# Patient Record
Sex: MALE | Race: ASIAN | NOT HISPANIC OR LATINO | Employment: FULL TIME | URBAN - METROPOLITAN AREA
[De-identification: names, ages, dates, MRNs, and addresses within clinical notes are randomized per-mention and may not be internally consistent; named-entity substitution may affect disease eponyms.]

---

## 2017-12-08 ENCOUNTER — OFFICE VISIT (OUTPATIENT)
Dept: URGENT CARE | Facility: URGENT CARE | Age: 42
End: 2017-12-08
Payer: COMMERCIAL

## 2017-12-08 VITALS
WEIGHT: 149 LBS | OXYGEN SATURATION: 98 % | DIASTOLIC BLOOD PRESSURE: 75 MMHG | TEMPERATURE: 98.2 F | HEART RATE: 78 BPM | SYSTOLIC BLOOD PRESSURE: 126 MMHG

## 2017-12-08 DIAGNOSIS — J20.9 ACUTE BRONCHITIS WITH SYMPTOMS > 10 DAYS: Primary | ICD-10-CM

## 2017-12-08 PROCEDURE — 99203 OFFICE O/P NEW LOW 30 MIN: CPT | Performed by: NURSE PRACTITIONER

## 2017-12-08 RX ORDER — METFORMIN HCL 500 MG
TABLET, EXTENDED RELEASE 24 HR ORAL
Refills: 2 | COMMUNITY
Start: 2017-10-25 | End: 2018-05-23

## 2017-12-08 RX ORDER — AZITHROMYCIN 250 MG/1
TABLET, FILM COATED ORAL
Qty: 6 TABLET | Refills: 0 | Status: SHIPPED | OUTPATIENT
Start: 2017-12-08 | End: 2018-02-28

## 2017-12-08 RX ORDER — BENZONATATE 200 MG/1
200 CAPSULE ORAL 3 TIMES DAILY PRN
Qty: 21 CAPSULE | Refills: 0 | Status: SHIPPED | OUTPATIENT
Start: 2017-12-08 | End: 2017-12-15

## 2017-12-08 NOTE — PATIENT INSTRUCTIONS

## 2017-12-08 NOTE — NURSING NOTE
Chief Complaint   Patient presents with     Cough     Pt c/o URI for 3-4 days.        Initial /75 (BP Location: Left arm, Patient Position: Chair, Cuff Size: Adult Regular)  Pulse 78  Temp 98.2  F (36.8  C) (Oral)  Wt 149 lb (67.6 kg)  SpO2 98% There is no height or weight on file to calculate BMI.  Medication Reconciliation: complete     Kimberlyn Cross CMA (AAMA)

## 2017-12-08 NOTE — MR AVS SNAPSHOT
After Visit Summary   12/8/2017    Dorian Husain    MRN: [id number]           Patient Information     Date Of Birth          1975        Visit Information        Provider Department      12/8/2017 12:30 PM Monique Wheeler NP [facility]        Today's Diagnoses     Acute bronchitis with symptoms > 10 days    -  1      Care Instructions      Acute Bronchitis  Your healthcare provider has told you that you have acute bronchitis. Bronchitis is infection or inflammation of the bronchial tubes (airways in the lungs). Normally, air moves easily in and out of the airways. Bronchitis narrows the airways, making it harder for air to flow in and out of the lungs. This causes symptoms such as shortness of breath, coughing up yellow or green mucus, and wheezing. Bronchitis can be acute or chronic. Acute means the condition comes on quickly and goes away in a short time, usually within 3 to 10 days. Chronic means a condition lasts a long time and often comes back.    What causes acute bronchitis?  Acute bronchitis almost always starts as a viral respiratory infection, such as a cold or the flu. Certain factors make it more likely for a cold or flu to turn into bronchitis. These include being very young, being elderly, having a heart or lung problem, or having a weak immune system. Cigarette smoking also makes bronchitis more likely.  When bronchitis develops, the airways become swollen. The airways may also become infected with bacteria. This is known as a secondary infection.  Diagnosing acute bronchitis  Your healthcare provider will examine you and ask about your symptoms and health history. You may also have a sputum culture to test the fluid in your lungs. Chest X-rays may be done to look for infection in the lungs.  Treating acute bronchitis  Bronchitis usually clears up as the cold or flu goes away. You can help feel better faster by doing the following:    Take medicine as directed.  You may be told to take ibuprofen or other over-the-counter medicines. These help relieve inflammation in your bronchial tubes. Your healthcare provider may prescribe an inhaler to help open up the bronchial tubes. Most of the time, acute bronchitis is caused by a viral infection. Antibiotics are usually not prescribed for viral infections.    Drink plenty of fluids, such as water, juice, or warm soup. Fluids loosen mucus so that you can cough it up. This helps you breathe more easily. Fluids also prevent dehydration.    Make sure you get plenty of rest.    Do not smoke. Do not allow anyone else to smoke in your home.  Recovery and follow-up  Follow up with your doctor as you are told. You will likely feel better in a week or two. But a dry cough can linger beyond that time. Let your doctor know if you still have symptoms (other than a dry cough) after 2 weeks, or if you re prone to getting bronchial infections. Take steps to protect yourself from future infections. These steps include stopping smoking and avoiding tobacco smoke, washing your hands often, and getting a yearly flu shot.  When to call your healthcare provider  Call the healthcare provider if you have any of the following:    Fever of 100.4 F (38.0 C) or higher, or as advised    Symptoms that get worse, or new symptoms    Trouble breathing    Symptoms that don t start to improve within a week, or within 3 days of taking antibiotics   Date Last Reviewed: 12/1/2016 2000-2017 [employer]. [address] [place] [address], [place] [address]. All rights reserved. This information is not intended as a substitute for professional medical care. Always follow your healthcare professional's instructions.                Follow-ups after your visit        Who to contact     If you have questions or need follow up information about today's clinic visit or your schedule please contact [facility] directly at [phone].  Normal or  "non-critical lab and imaging results will be communicated to you by MyChart, letter or phone within 4 business days after the clinic has received the results. If you do not hear from us within 7 days, please contact the clinic through [employer] or phone. If you have a critical or abnormal lab result, we will notify you by phone as soon as possible.  Submit refill requests through [employer] or call your pharmacy and they will forward the refill request to us. Please allow 3 business days for your refill to be completed.          Additional Information About Your Visit        Negorama Information     Negorama lets you send messages to your doctor, view your test results, renew your prescriptions, schedule appointments and more. To sign up, go to [email or web address] . Click on \"Log in\" on the left side of the screen, which will take you to the Welcome page. Then click on \"Sign up Now\" on the right side of the page.     You will be asked to enter the access code listed below, as well as some personal information. Please follow the directions to create your username and password.     Your access code is: [email or web address]  Expires: 3/8/2018  1:17 PM     Your access code will  in 90 days. If you need help or a new code, please call your [facility] clinic or [phone].        Care EveryWhere ID     This is your Care EveryWhere ID. This could be used by other organizations to access your [facility] medical records  [id number]        Your Vitals Were     Pulse Temperature Pulse Oximetry             78 98.2  F (36.8  C) (Oral) 98%          Blood Pressure from Last 3 Encounters:   17 126/75    Weight from Last 3 Encounters:   17 149 lb (67.6 kg)              Today, you had the following     No orders found for display         Today's Medication Changes          These changes are accurate as of: 17  1:17 PM.  If you have any questions, ask your nurse or doctor.               Start taking these medicines.        " Dose/Directions    azithromycin 250 MG tablet   Commonly known as:  ZITHROMAX   Used for:  Acute bronchitis with symptoms > 10 days   Started by:  Monique Wheeler NP        Two tablets first day, then one tablet daily for four days.   Quantity:  6 tablet   Refills:  0       benzonatate 200 MG capsule   Commonly known as:  TESSALON   Used for:  Acute bronchitis with symptoms > 10 days   Started by:  Monique Wheeler NP        Dose:  200 mg   Take 1 capsule (200 mg) by mouth 3 times daily as needed   Quantity:  21 capsule   Refills:  0            Where to get your medicines      These medications were sent to [place] [address] [facility] - [facility], [place] - [address]  [address], [facility] [address]     Phone:  [phone]     azithromycin 250 MG tablet    benzonatate 200 MG capsule                Primary Care Provider Fax #    Physician No Ref-Primary [phone]       No address on file        Equal Access to Services     CRISTAL [facility]IL[facility]RE : Hadii renée guerreroo Soadam, waaxda luqadaha, qaybta kaalmada adenicolleyada, riccardo rosado . So [facility] [phone].    ATENCIÓN: Si habla español, tiene a cruz disposición servicios gratuitos de asistencia lingüística. Llame al [phone].    We comply with applicable federal civil rights laws and [place] laws. We do not discriminate on the basis of race, color, national origin, age, disability, sex, sexual orientation, or gender identity.            Thank you!     Thank you for choosing [facility]  for your care. Our goal is always to provide you with excellent care. Hearing back from our patients is one way we can continue to improve our services. Please take a few minutes to complete the written survey that you may receive in the mail after your visit with us. Thank you!             Your Updated Medication List - Protect others around you: Learn how to safely use, store and throw away your medicines at www.disposemymeds.org.           This list is accurate as of: 12/8/17  1:17 PM.  Always use your most recent med list.                   Brand Name Dispense Instructions for use Diagnosis    azithromycin 250 MG tablet    ZITHROMAX    6 tablet    Two tablets first day, then one tablet daily for four days.    Acute bronchitis with symptoms > 10 days       benzonatate 200 MG capsule    TESSALON    21 capsule    Take 1 capsule (200 mg) by mouth 3 times daily as needed    Acute bronchitis with symptoms > 10 days       IBUPROFEN PO      Take 200 mg by mouth        metFORMIN 500 MG 24 hr tablet    GLUCOPHAGE-XR     TAKE 2 TABLETS (1,000 MG) BY MOUTH DAILY.

## 2017-12-08 NOTE — PROGRESS NOTES
SUBJECTIVE:   Dorian Husain is a 42 year old male who presents to clinic today for the following health issues:      RESPIRATORY SYMPTOMS      Duration: 3-4 days fever, coughing for 10 days    Description  Cough, fever-yue, pressure in chest, throat pain    Severity: moderate    Accompanying signs and symptoms: None    History (predisposing factors):  none    Precipitating or alleviating factors: None    Therapies tried and outcome: ibuprofen- some relief.    Problem list and histories reviewed & adjusted, as indicated.  Additional history: as documented    There is no problem list on file for this patient.    No past surgical history on file.    Social History   Substance Use Topics     Smoking status: Light Tobacco Smoker     Smokeless tobacco: Not on file     Alcohol use Not on file     No family history on file.      Current Outpatient Prescriptions   Medication Sig Dispense Refill     metFORMIN (GLUCOPHAGE-XR) 500 MG 24 hr tablet TAKE 2 TABLETS (1,000 MG) BY MOUTH DAILY.  2     IBUPROFEN PO Take 200 mg by mouth       azithromycin (ZITHROMAX) 250 MG tablet Two tablets first day, then one tablet daily for four days. 6 tablet 0     benzonatate (TESSALON) 200 MG capsule Take 1 capsule (200 mg) by mouth 3 times daily as needed 21 capsule 0     No Known Allergies      Reviewed and updated as needed this visit by clinical staff     Reviewed and updated as needed this visit by Provider         ROS:  C: NEGATIVE for fever, chills, change in weight  E/M: NEGATIVE for ear, mouth and throat problems  RESP:POSITIVE for cough-productive and sputum CV: NEGATIVE for chest pain, palpitations or peripheral edema  NEURO: NEGATIVE for weakness, dizziness or paresthesias  PSYCHIATRIC: NEGATIVE for changes in mood or affect    OBJECTIVE:     /75 (BP Location: Left arm, Patient Position: Chair, Cuff Size: Adult Regular)  Pulse 78  Temp 98.2  F (36.8  C) (Oral)  Wt 149 lb (67.6 kg)  SpO2 98%  There is no height or  weight on file to calculate BMI.  GENERAL: healthy, alert and no distress  NECK: no adenopathy, no asymmetry, masses, or scars and thyroid normal to palpation  RESP: rhonchi bilateral and cough  CV: regular rate and rhythm, normal S1 S2, no S3 or S4, no murmur, click or rub, no peripheral edema and peripheral pulses strong  ABDOMEN: soft, nontender, no hepatosplenomegaly, no masses and bowel sounds normal  MS: no gross musculoskeletal defects noted, no edema    Diagnostic Test Results:  none     ASSESSMENT/PLAN:       ICD-10-CM    1. Acute bronchitis with symptoms > 10 days J20.9 azithromycin (ZITHROMAX) 250 MG tablet     benzonatate (TESSALON) 200 MG capsule       I recommend follow up with PCP if no relief in 3 days or sooner if worse  Adverse reactions and side effects to medications discussed  OTC medications discussed  Patient is aware to come back if having worsening symptoms or no relief despite the treatment plan  Patient voiced understanding and had no further questions  Monique Wheeler  FNP-BC  Family Nurse Practitoner

## 2018-02-28 ENCOUNTER — OFFICE VISIT (OUTPATIENT)
Dept: FAMILY MEDICINE | Facility: CLINIC | Age: 43
End: 2018-02-28
Payer: COMMERCIAL

## 2018-02-28 VITALS
HEIGHT: 67 IN | HEART RATE: 75 BPM | SYSTOLIC BLOOD PRESSURE: 150 MMHG | WEIGHT: 148 LBS | DIASTOLIC BLOOD PRESSURE: 80 MMHG | BODY MASS INDEX: 23.23 KG/M2 | OXYGEN SATURATION: 98 % | TEMPERATURE: 98.3 F

## 2018-02-28 DIAGNOSIS — R73.01 IMPAIRED FASTING GLUCOSE: ICD-10-CM

## 2018-02-28 DIAGNOSIS — R03.0 ELEVATED BLOOD PRESSURE READING WITHOUT DIAGNOSIS OF HYPERTENSION: ICD-10-CM

## 2018-02-28 DIAGNOSIS — R07.9 CHEST PAIN IN ADULT: Primary | ICD-10-CM

## 2018-02-28 DIAGNOSIS — R53.83 FATIGUE, UNSPECIFIED TYPE: ICD-10-CM

## 2018-02-28 DIAGNOSIS — E78.2 MIXED HYPERLIPIDEMIA: ICD-10-CM

## 2018-02-28 DIAGNOSIS — Z82.49 FAMILY HISTORY OF ISCHEMIC HEART DISEASE: ICD-10-CM

## 2018-02-28 PROBLEM — R73.03 PREDIABETES: Status: ACTIVE | Noted: 2017-07-28

## 2018-02-28 PROCEDURE — 93000 ELECTROCARDIOGRAM COMPLETE: CPT | Performed by: PREVENTIVE MEDICINE

## 2018-02-28 PROCEDURE — 99214 OFFICE O/P EST MOD 30 MIN: CPT | Performed by: PREVENTIVE MEDICINE

## 2018-02-28 ASSESSMENT — PAIN SCALES - GENERAL: PAINLEVEL: NO PAIN (0)

## 2018-02-28 NOTE — MR AVS SNAPSHOT
After Visit Summary   2/28/2018    Dorian Husain    MRN: [id number]           Patient Information     Date Of Birth          1975        Visit Information        Provider Department      2/28/2018 6:20 PM Angelina Sharma MD [facility]        Today's Diagnoses     Chest pain in adult    -  1    Family history of ischemic heart disease        Impaired fasting glucose        Mixed hyperlipidemia        Fatigue, unspecified type          Care Instructions    At [facility], we strive to deliver an exceptional experience to you, every time we see you.  If you receive a survey in the mail, please send us back your thoughts. We really do value your feedback.    Based on your medical history, these are the current health maintenance/preventive care services that you are due for (some may have been done at this visit.)  Health Maintenance Due   Topic Date Due     LIPID SCREEN Q5 YR MALE (SYSTEM ASSIGNED)  08/01/2010     INFLUENZA VACCINE (SYSTEM ASSIGNED)  09/01/2017         Suggested websites for health information:  Www.[employer] : Up to date and easily searchable information on multiple topics.  Www.medlineplus.gov : medication info, interactive tutorials, watch real surgeries online  Www.familydoctor.org : good info from the Academy of Family Physicians  Www.cdc.gov : public health info, travel advisories, epidemics (H1N1)  Www.aap.org : children's health info, normal development, vaccinations  Www.health.state.[place].us : [place] dept of health, public health issues in [place], N1N1    Your care team:                            Family Medicine Internal Medicine   MD Dani Phelan MD Shantel Branch-Fleming, MD Katya Georgiev PA-C Megan Hill, APRN RICKEY Vincent MD Pediatrics   LUANA Mendez, RICKEY Hughes APRN MD Liz Bradford MD Deborah Mielke, MD Kim Thein, APRN CNP      Clinic hours: Monday  - Thursday 7 am-7 pm; Fridays 7 am-5 pm.   Urgent care: Monday - Friday 11 am-9 pm; Saturday and Sunday 9 am-5 pm.  Pharmacy : Monday -Thursday 8 am-8 pm; Friday 8 am-6 pm; Saturday and Sunday 9 am-5 pm.     Clinic: [phone]   Pharmacy: [phone]              Follow-ups after your visit        Your next 10 appointments already scheduled     Mar 07, 2018  6:00 PM CST   [facility] Cata Sutherland [facility]   [facility]    [address] [place] [id number] [phone]              Future tests that were ordered for you today     Open Future Orders        Priority Expected Expires Ordered    Echo Stress Test With Definity Routine  2/28/2019 2/28/2018    Lipid panel reflex to direct LDL Fasting Routine  8/28/2018 2/28/2018    Vitamin D Deficiency Routine  8/28/2018 2/28/2018    Vitamin B12 Routine  8/28/2018 2/28/2018    TSH with free T4 reflex Routine  8/28/2018 2/28/2018    CBC with platelets Routine  8/28/2018 2/28/2018    Comprehensive metabolic panel Routine  8/28/2018 2/28/2018    Ferritin Routine  8/28/2018 2/28/2018            Who to contact     If you have questions or need follow up information about today's clinic visit or your schedule please contact [facility] directly at [phone].  Normal or non-critical lab and imaging results will be communicated to you by [employer], letter or phone within 4 business days after the clinic has received the results. If you do not hear from us within 7 days, please contact the clinic through [employer] or phone. If you have a critical or abnormal lab result, we will notify you by phone as soon as possible.  Submit refill requests through [employer] or call your pharmacy and they will forward the refill request to us. Please allow 3 business days for your refill to be completed.          Additional Information About Your Visit        MyChart Information     ProNerve lets you  "send messages to your doctor, view your test results, renew your prescriptions, schedule appointments and more. To sign up, go to [email or web address] . Click on \"Log in\" on the left side of the screen, which will take you to the Welcome page. Then click on \"Sign up Now\" on the right side of the page.     You will be asked to enter the access code listed below, as well as some personal information. Please follow the directions to create your username and password.     Your access code is: [email or web address]  Expires: 3/8/2018  1:17 PM     Your access code will  in 90 days. If you need help or a new code, please call your [facility] clinic or [phone].        Care EveryWhere ID     This is your Care EveryWhere ID. This could be used by other organizations to access your [facility] medical records  [id number]        Your Vitals Were     Pulse Temperature Height Pulse Oximetry BMI (Body Mass Index)       75 98.3  F (36.8  C) (Oral) 5' 6.5\" (1.689 m) 98% 23.53 kg/m2        Blood Pressure from Last 3 Encounters:   18 150/80   17 126/75    Weight from Last 3 Encounters:   18 148 lb (67.1 kg)   17 149 lb (67.6 kg)              We Performed the Following     EKG 12-lead complete w/read - Clinics          Today's Medication Changes          These changes are accurate as of 18  6:55 PM.  If you have any questions, ask your nurse or doctor.               Stop taking these medicines if you haven't already. Please contact your care team if you have questions.     azithromycin 250 MG tablet   Commonly known as:  ZITHROMAX   Stopped by:  Angelina Sharma MD           IBUPROFEN PO   Stopped by:  Angelina Sharma MD                    Primary Care Provider Fax #    Physician No Ref-Primary [phone]       No address on file        Equal Access to Services     CRISTAL LOGAN AH: Lupe Wheeler, waaxda luqadaha, qaybta kaalmariccardo wild. So [facility] " [phone].    ATENCIÓN: Si raghav zuñiga, tiene a cruz disposición servicios gratuitos de asistencia lingüística. Ant al [phone].    We comply with applicable federal civil rights laws and [place] laws. We do not discriminate on the basis of race, color, national origin, age, disability, sex, sexual orientation, or gender identity.            Thank you!     Thank you for choosing [facility]  for your care. Our goal is always to provide you with excellent care. Hearing back from our patients is one way we can continue to improve our services. Please take a few minutes to complete the written survey that you may receive in the mail after your visit with us. Thank you!             Your Updated Medication List - Protect others around you: Learn how to safely use, store and throw away your medicines at www.disposemymeds.org.          This list is accurate as of 2/28/18  6:55 PM.  Always use your most recent med list.                   Brand Name Dispense Instructions for use Diagnosis    metFORMIN 500 MG 24 hr tablet    GLUCOPHAGE-XR     TAKE 2 TABLETS (1,000 MG) BY MOUTH DAILY.

## 2018-03-01 NOTE — PATIENT INSTRUCTIONS
At [facility], we strive to deliver an exceptional experience to you, every time we see you.  If you receive a survey in the mail, please send us back your thoughts. We really do value your feedback.    Based on your medical history, these are the current health maintenance/preventive care services that you are due for (some may have been done at this visit.)  Health Maintenance Due   Topic Date Due     LIPID SCREEN Q5 YR MALE (SYSTEM ASSIGNED)  08/01/2010     INFLUENZA VACCINE (SYSTEM ASSIGNED)  09/01/2017         Suggested websites for health information:  Www.[employer].org : Up to date and easily searchable information on multiple topics.  Www.[employer].gov : medication info, interactive tutorials, watch real surgeries online  Www.familydoctor.org : good info from the Academy of Family Physicians  Www.cdc.gov : public health info, travel advisories, epidemics (H1N1)  Www.aap.org : children's health info, normal development, vaccinations  Www.health.[facility].[place].us : [place] dept of health, public health issues in [place], N1N1    Your care team:                            Family Medicine Internal Medicine   MD Dani Phelan MD Shantel Branch-Fleming, MD Katya Georgiev PA-C Megan Hill, APRN CNP    Eduardo Vincent MD Pediatrics   Flavio Turpin, PAHEATHER Campbell, CNP Wendy Hughes APRN CNP   MD Liz Meraz MD Deborah Mielke, MD Kim Thein, APRN [facility]      Clinic hours: Monday - Thursday 7 am-7 pm; Fridays 7 am-5 pm.   Urgent care: Monday - Friday 11 am-9 pm; Saturday and Sunday 9 am-5 pm.  Pharmacy : Monday -Thursday 8 am-8 pm; Friday 8 am-6 pm; Saturday and Sunday 9 am-5 pm.     Clinic: [phone]   Pharmacy: [phone]

## 2018-03-01 NOTE — PROGRESS NOTES
SUBJECTIVE:   Dorian Husain is a 42 year old male who presents to clinic today for the following health issues:      Gastrointestinal symptoms      Duration: x 3 weeks    Description:           REFLUX SYMPTOMS - heartburn and chest pain      Intensity:  moderate    Accompanying signs and symptoms:  none    History  Previous {similar problem: no   Previous evaluation:  none    Aggravating factors: none    Alleviating factors: nothing    Other Therapies tried: Tums, does not help much    Always feel pain over his sternum  Feels short of breath and fatigued    Chest Pain      Onset: 3-4 weeks    Description (location/character/radiation/duration): Central chest and left side, and shoulder pains, pain constant    Intensity:  moderate    Accompanying signs and symptoms:        Shortness of breath: YES       Sweating: no        Nausea/vomitting: no        Palpitations: no, occasional flutter       Other (fevers/chills/cough/heartburn/lightheadedness): no     History (similar episodes/previous evaluation): None    Precipitating or alleviating factors:       Worse with exertion: YES- feels tired       Worse with breathing: no        Related to eating: no        Better with burping: YES- sometimes     Therapies tried and outcome: None    Family history of CAD, older brother with stent age 55 years   Father with MI at age 60 years  Never had a stress test   Tobacco use+    Impaired glucose:  Has been on Metformin    High cholesterol:  Has stopped a few months ago as readings were better    Problem list and histories reviewed & adjusted, as indicated.  Additional history: as documented    Patient Active Problem List   Diagnosis     Family history of ischemic heart disease     Mixed hyperlipidemia     History reviewed. No pertinent surgical history.    Social History   Substance Use Topics     Smoking status: Light Tobacco Smoker     Smokeless tobacco: Never Used     Alcohol use Not on file     No family history on file.   "    Current Outpatient Prescriptions   Medication Sig Dispense Refill     metFORMIN (GLUCOPHAGE-XR) 500 MG 24 hr tablet TAKE 2 TABLETS (1,000 MG) BY MOUTH DAILY.  2     No Known Allergies  No lab results found.   BP Readings from Last 3 Encounters:   02/28/18 150/80   12/08/17 126/75    Wt Readings from Last 3 Encounters:   02/28/18 148 lb (67.1 kg)   12/08/17 149 lb (67.6 kg)                  Labs reviewed in EPIC    Reviewed and updated as needed this visit by clinical staff  Allergies  Meds       Reviewed and updated as needed this visit by Provider         ROS:  Constitutional, neuro, ENT, endocrine, pulmonary, cardiac, gastrointestinal, genitourinary, musculoskeletal, integument and psychiatric systems are negative, except as otherwise noted.    OBJECTIVE:                                                    /80  Pulse 75  Temp 98.3  F (36.8  C) (Oral)  Ht 5' 6.5\" (1.689 m)  Wt 148 lb (67.1 kg)  SpO2 98%  BMI 23.53 kg/m2  Body mass index is 23.53 kg/(m^2).  GENERAL APPEARANCE: healthy, alert and no distress  EYES: Eyes grossly normal to inspection and conjunctivae and sclerae normal  NECK: no adenopathy, no asymmetry, masses, or scars and trachea midline and normal to palpation  RESP: lungs clear to auscultation - no rales, rhonchi or wheezes  CV: regular rates and rhythm, normal S1 S2, no S3 or S4, no murmur, click or rub, no irregular beats and peripheral pulses strong  ABDOMEN: soft, non-tender  MS: extremities normal- no gross deformities noted  SKIN: no suspicious lesions or rashes  NEURO: Normal strength and tone, mentation intact and speech normal  PSYCH: mentation appears normal    Diagnostic test results:  Diagnostic Test Results:  Results for orders placed or performed in visit on 02/28/18 (from the past 24 hour(s))   EKG 12-lead complete w/read - Clinics    Impression    Sinus rhythm. No acute ST changes, no changes when compared to previous EKG.  Angelina Sharma MD MPH      "     ASSESSMENT/PLAN:                                                    1. Chest pain in adult  -No acute changes on EKG  -ER precautions reviewed in detail, if chest pressure, radiation to left arm, Shortness of breath, emesis then needs to call 911    2. Family history of ischemic heart disease  -Await results of stress test  - Echo Stress Test With Definity; Future    3. Impaired fasting glucose  -last HbA1C outside of [place] 7/17 was 6.2  - Hemoglobin A1c; Future    4. Mixed hyperlipidemia  - Lipid panel reflex to direct LDL Fasting; Future    5. Fatigue, unspecified type  - Vitamin D Deficiency; Future  - Vitamin B12; Future  - TSH with free T4 reflex; Future  - CBC with platelets; Future  - Comprehensive metabolic panel; Future  - Ferritin; Future    6. Elevated blood pressure reading without diagnosis of hypertension  -Has a monitor at home, if over 140/90 consistently then need to start medication       Follow up with Provider - will contact with labs when available      Angelina Sharma MD MPH    [facility]

## 2018-03-02 DIAGNOSIS — R73.01 IMPAIRED FASTING GLUCOSE: ICD-10-CM

## 2018-03-02 DIAGNOSIS — E78.2 MIXED HYPERLIPIDEMIA: ICD-10-CM

## 2018-03-02 DIAGNOSIS — R53.83 FATIGUE, UNSPECIFIED TYPE: ICD-10-CM

## 2018-03-02 LAB
ALBUMIN SERPL-MCNC: 4.5 G/DL (ref 3.4–5)
ALP SERPL-CCNC: 63 U/L (ref 40–150)
ALT SERPL W P-5'-P-CCNC: 37 U/L (ref 0–70)
ANION GAP SERPL CALCULATED.3IONS-SCNC: 9 MMOL/L (ref 3–14)
AST SERPL W P-5'-P-CCNC: 20 U/L (ref 0–45)
BILIRUB SERPL-MCNC: 0.4 MG/DL (ref 0.2–1.3)
BUN SERPL-MCNC: 12 MG/DL (ref 7–30)
CALCIUM SERPL-MCNC: 9 MG/DL (ref 8.5–10.1)
CHLORIDE SERPL-SCNC: 101 MMOL/L (ref 94–109)
CHOLEST SERPL-MCNC: 170 MG/DL
CO2 SERPL-SCNC: 27 MMOL/L (ref 20–32)
CREAT SERPL-MCNC: 0.88 MG/DL (ref 0.66–1.25)
ERYTHROCYTE [DISTWIDTH] IN BLOOD BY AUTOMATED COUNT: 14.4 % (ref 10–15)
FERRITIN SERPL-MCNC: 190 NG/ML (ref 26–388)
GFR SERPL CREATININE-BSD FRML MDRD: >90 ML/MIN/1.7M2
GLUCOSE SERPL-MCNC: 93 MG/DL (ref 70–99)
HBA1C MFR BLD: 5.7 % (ref 4.3–6)
HCT VFR BLD AUTO: 41.3 % (ref 40–53)
HDLC SERPL-MCNC: 43 MG/DL
HGB BLD-MCNC: 13.5 G/DL (ref 13.3–17.7)
LDLC SERPL CALC-MCNC: 71 MG/DL
MCH RBC QN AUTO: 27.8 PG (ref 26.5–33)
MCHC RBC AUTO-ENTMCNC: 32.7 G/DL (ref 31.5–36.5)
MCV RBC AUTO: 85 FL (ref 78–100)
NONHDLC SERPL-MCNC: 127 MG/DL
PLATELET # BLD AUTO: 271 10E9/L (ref 150–450)
POTASSIUM SERPL-SCNC: 4 MMOL/L (ref 3.4–5.3)
PROT SERPL-MCNC: 7.7 G/DL (ref 6.8–8.8)
RBC # BLD AUTO: 4.85 10E12/L (ref 4.4–5.9)
SODIUM SERPL-SCNC: 137 MMOL/L (ref 133–144)
TRIGL SERPL-MCNC: 278 MG/DL
TSH SERPL DL<=0.005 MIU/L-ACNC: 1.76 MU/L (ref 0.4–4)
VIT B12 SERPL-MCNC: 557 PG/ML (ref 193–986)
WBC # BLD AUTO: 6.6 10E9/L (ref 4–11)

## 2018-03-02 PROCEDURE — 82607 VITAMIN B-12: CPT | Performed by: PREVENTIVE MEDICINE

## 2018-03-02 PROCEDURE — 36415 COLL VENOUS BLD VENIPUNCTURE: CPT | Performed by: PREVENTIVE MEDICINE

## 2018-03-02 PROCEDURE — 85027 COMPLETE CBC AUTOMATED: CPT | Performed by: PREVENTIVE MEDICINE

## 2018-03-02 PROCEDURE — 83036 HEMOGLOBIN GLYCOSYLATED A1C: CPT | Performed by: PREVENTIVE MEDICINE

## 2018-03-02 PROCEDURE — 80053 COMPREHEN METABOLIC PANEL: CPT | Performed by: PREVENTIVE MEDICINE

## 2018-03-02 PROCEDURE — 84443 ASSAY THYROID STIM HORMONE: CPT | Performed by: PREVENTIVE MEDICINE

## 2018-03-02 PROCEDURE — 80061 LIPID PANEL: CPT | Performed by: PREVENTIVE MEDICINE

## 2018-03-02 PROCEDURE — 82306 VITAMIN D 25 HYDROXY: CPT | Performed by: PREVENTIVE MEDICINE

## 2018-03-02 PROCEDURE — 82728 ASSAY OF FERRITIN: CPT | Performed by: PREVENTIVE MEDICINE

## 2018-03-02 NOTE — LETTER
[address] [address]  [phone]                                                                                                           Dorian Husain  [address] [address]    March 6, 2018    Dear Dorian Husain     Here are your cholesterol results:     Your LDL is: Lab Results        Component                Value               Date                        LDL                      71                  03/02/2018          Your LDL goal is to be less than 130   Your HDL is: Lab Results        Component                Value               Date                        HDL                      43                  03/02/2018         Goal HDL is Greater than 40 (for men) or 50 (for women).   Your Triglycerides are: Lab Results        Component                Value               Date                        TRIG                     278                 03/02/2018       Goal TRIGLYCERIDES are less than 150.       Here are some ways to improve your cholesterol without medication:     Try to get at least 45 minutes of aerobic exercise 5-6 days a week   Maintain a healthy body weight   Eat less saturated fats   Buy lean cuts of meat, reduce your portions of red meat or substitute poultry or fish   Avoid fried or fast foods that are high in fat   Eat more fruits and vegetables     Basic blood count did not show anemia or infection in the blood.   Electrolytes, glucose, kidney function, liver function, thyroid function, iron stores, Vitamin B 12 and Vitamin D levels are normal.   Three month glucose is in the impaired range, this means that you do not have diabetes, but are at an increased risk of developing diabetes. Regular exercise will help prevent this.   Plan of care and follow up as discussed in clinic.   Please let me know if you have any questions and thank you for choosing [place].     Regards,     Angelina Sharma MD  MPH/smj    Results for orders placed or performed in visit on 03/02/18   Lipid panel reflex to direct LDL Fasting   Result Value Ref Range    Cholesterol 170 <200 mg/dL    Triglycerides 278 (H) <150 mg/dL    HDL Cholesterol 43 >39 mg/dL    LDL Cholesterol Calculated 71 <100 mg/dL    Non HDL Cholesterol 127 <130 mg/dL   Vitamin D Deficiency   Result Value Ref Range    Vitamin D Deficiency screening 29 20 - 75 ug/L   Vitamin B12   Result Value Ref Range    Vitamin B12 557 193 - 986 pg/mL   TSH with free T4 reflex   Result Value Ref Range    TSH 1.76 0.40 - 4.00 mU/L   CBC with platelets   Result Value Ref Range    WBC 6.6 4.0 - 11.0 10e9/L    RBC Count 4.85 4.4 - 5.9 10e12/L    Hemoglobin 13.5 13.3 - 17.7 g/dL    Hematocrit 41.3 40.0 - 53.0 %    MCV 85 78 - 100 fl    MCH 27.8 26.5 - 33.0 pg    MCHC 32.7 31.5 - 36.5 g/dL    RDW 14.4 10.0 - 15.0 %    Platelet Count 271 150 - 450 10e9/L   Comprehensive metabolic panel   Result Value Ref Range    Sodium 137 133 - 144 mmol/L    Potassium 4.0 3.4 - 5.3 mmol/L    Chloride 101 94 - 109 mmol/L    Carbon Dioxide 27 20 - 32 mmol/L    Anion Gap 9 3 - 14 mmol/L    Glucose 93 70 - 99 mg/dL    Urea Nitrogen 12 7 - 30 mg/dL    Creatinine 0.88 0.66 - 1.25 mg/dL    GFR Estimate >90 >60 mL/min/1.7m2    GFR Estimate If Black >90 >60 mL/min/1.7m2    Calcium 9.0 8.5 - 10.1 mg/dL    Bilirubin Total 0.4 0.2 - 1.3 mg/dL    Albumin 4.5 3.4 - 5.0 g/dL    Protein Total 7.7 6.8 - 8.8 g/dL    Alkaline Phosphatase 63 40 - 150 U/L    ALT 37 0 - 70 U/L    AST 20 0 - 45 U/L   Ferritin   Result Value Ref Range    Ferritin 190 26 - 388 ng/mL   Hemoglobin A1c   Result Value Ref Range    Hemoglobin A1C 5.7 4.3 - 6.0 %

## 2018-03-05 ENCOUNTER — APPOINTMENT (OUTPATIENT)
Dept: OPTOMETRY | Facility: CLINIC | Age: 43
End: 2018-03-05
Payer: COMMERCIAL

## 2018-03-05 ENCOUNTER — OFFICE VISIT (OUTPATIENT)
Dept: OPTOMETRY | Facility: CLINIC | Age: 43
End: 2018-03-05
Payer: COMMERCIAL

## 2018-03-05 DIAGNOSIS — R73.03 PREDIABETES: ICD-10-CM

## 2018-03-05 DIAGNOSIS — H52.4 PRESBYOPIA: Primary | ICD-10-CM

## 2018-03-05 DIAGNOSIS — H52.223 REGULAR ASTIGMATISM OF BOTH EYES: ICD-10-CM

## 2018-03-05 PROCEDURE — 92004 COMPRE OPH EXAM NEW PT 1/>: CPT | Performed by: OPTOMETRIST

## 2018-03-05 PROCEDURE — 92015 DETERMINE REFRACTIVE STATE: CPT | Performed by: OPTOMETRIST

## 2018-03-05 PROCEDURE — 92341 FIT SPECTACLES BIFOCAL: CPT | Performed by: OPTOMETRIST

## 2018-03-05 ASSESSMENT — REFRACTION_MANIFEST
OD_CYLINDER: +0.25
OS_ADD: +1.25
OS_CYLINDER: +0.50
OD_AXIS: 153
OD_SPHERE: -0.25
OD_ADD: +1.25
OS_SPHERE: -0.50
OS_AXIS: 016

## 2018-03-05 ASSESSMENT — VISUAL ACUITY
OD_SC: 20/60-1
METHOD: SNELLEN - LINEAR
OS_SC: 20/25
OS_SC+: -2
OD_SC: 20/25
OD_SC+: -2
OS_SC: 20/40

## 2018-03-05 ASSESSMENT — CUP TO DISC RATIO
OS_RATIO: 0.6
OD_RATIO: 0.7

## 2018-03-05 ASSESSMENT — EXTERNAL EXAM - RIGHT EYE: OD_EXAM: NORMAL

## 2018-03-05 ASSESSMENT — CONF VISUAL FIELD
OD_NORMAL: 1
OS_NORMAL: 1

## 2018-03-05 ASSESSMENT — TONOMETRY
OD_IOP_MMHG: 20
OS_IOP_MMHG: 21
IOP_METHOD: TONOPEN

## 2018-03-05 ASSESSMENT — EXTERNAL EXAM - LEFT EYE: OS_EXAM: NORMAL

## 2018-03-05 ASSESSMENT — SLIT LAMP EXAM - LIDS
COMMENTS: NORMAL
COMMENTS: NORMAL

## 2018-03-05 NOTE — MR AVS SNAPSHOT
"              After Visit Summary   3/5/2018    Dorian Husain    MRN: [id number]           Patient Information     Date Of Birth          1975        Visit Information        Provider Department      3/5/2018 5:40 PM Omid Monzon, OD [facility]        Today's Diagnoses     Presbyopia    -  1    Regular astigmatism of both eyes        Prediabetes          Care Instructions    Recommend new glasses.  Your options are a bifocal which would allow you to see distance and near vision or separate glasses for reading.    Recommend returning for dilated fundus exam. It is a more thorough exam of the retina.  It is important to follow up on the glaucoma testing.    Omid Monzon, ANGIE            Follow-ups after your visit        Follow-up notes from your care team     Return for dilated fundus exam.      Who to contact     If you have questions or need follow up information about today's clinic visit or your schedule please contact [facility] directly at [phone].  Normal or non-critical lab and imaging results will be communicated to you by MyChart, letter or phone within 4 business days after the clinic has received the results. If you do not hear from us within 7 days, please contact the clinic through [employer] or phone. If you have a critical or abnormal lab result, we will notify you by phone as soon as possible.  Submit refill requests through [employer] or call your pharmacy and they will forward the refill request to us. Please allow 3 business days for your refill to be completed.          Additional Information About Your Visit        [employer] Information     [employer] lets you send messages to your doctor, view your test results, renew your prescriptions, schedule appointments and more. To sign up, go to [email or web address] . Click on \"Log in\" on the left side of the screen, which will take you to the Welcome page. Then click on \"Sign up Now\" on the right side of the " page.     You will be asked to enter the access code listed below, as well as some personal information. Please follow the directions to create your username and password.     Your access code is: [email or web address]  Expires: 3/8/2018  1:17 PM     Your access code will  in 90 days. If you need help or a new code, please call your [facility] clinic or [phone].        Care EveryWhere ID     This is your Care EveryWhere ID. This could be used by other organizations to access your [facility] medical records  [id number]         Blood Pressure from Last 3 Encounters:   18 150/80   17 126/75    Weight from Last 3 Encounters:   18 67.1 kg (148 lb)   17 67.6 kg (149 lb)              We Performed the Following     EYE EXAM (SIMPLE-NONBILLABLE)     REFRACTION        Primary Care Provider Fax #    Physician No Ref-Primary [phone]       No address on file        Equal Access to Services     KAREN LOGAN : Hadii aad ku hadasho Soseverianoali, waaxda luqadaha, qaybta kaalmada adeegyada, waxay aranza rosado . So [facility] [phone].    ATENCIÓN: Si habla español, tiene a cruz disposición servicios gratuitos de asistencia lingüística. Llame al [phone].    We comply with applicable federal civil rights laws and [place] laws. We do not discriminate on the basis of race, color, national origin, age, disability, sex, sexual orientation, or gender identity.            Thank you!     Thank you for choosing [facility]  for your care. Our goal is always to provide you with excellent care. Hearing back from our patients is one way we can continue to improve our services. Please take a few minutes to complete the written survey that you may receive in the mail after your visit with us. Thank you!             Your Updated Medication List - Protect others around you: Learn how to safely use, store and throw away your medicines at www.disposemymeds.org.          This list is  accurate as of 3/5/18  6:03 PM.  Always use your most recent med list.                   Brand Name Dispense Instructions for use Diagnosis    metFORMIN 500 MG 24 hr tablet    GLUCOPHAGE-XR     TAKE 2 TABLETS (1,000 MG) BY MOUTH DAILY.

## 2018-03-05 NOTE — PROGRESS NOTES
Chief Complaint   Patient presents with     COMPREHENSIVE EYE EXAM        Lab Results   Component Value Date    A1C 5.7 03/02/2018       Last Eye Exam: 1st eye exam  Dilated Previously: No, side effects of dilation explained today,info given to patient     What are you currently using to see?  does not use glasses or contacts    Distance Vision Acuity: Noticed gradual change in both eyes    Near Vision Acuity: Not satisfied     Eye Comfort: good  Do you use eye drops? : No  Occupation or Hobbies: computer    Siri Werner Optometric Assistant, A[employer]     Medical, surgical and family histories reviewed and updated 3/5/2018.       OBJECTIVE: See Ophthalmology exam    ASSESSMENT:    ICD-10-CM    1. Presbyopia H52.4 REFRACTION   2. Regular astigmatism of both eyes H52.223 REFRACTION   3. Prediabetes R73.03 EYE EXAM (SIMPLE-NONBILLABLE)      PLAN:    Dorian Husain aware  eye exam results will be sent to No Ref-Primary, Physician.  Patient Instructions   Recommend new glasses.  Your options are a bifocal which would allow you to see distance and near vision or separate glasses for reading.    Recommend returning for dilated fundus exam. It is a more thorough exam of the retina.  It is important to follow up on the glaucoma testing.  (patient will call)    Omid Monzon, OD

## 2018-03-05 NOTE — LETTER
3/5/2018         RE: Dorian Husain  [address] [facility] [address]        Dear Colleague,    Thank you for referring your patient, Dorian Husain, to the [facility]. Please see a copy of my visit note below.    Chief Complaint   Patient presents with     COMPREHENSIVE EYE EXAM        Lab Results   Component Value Date    A1C 5.7 03/02/2018       Last Eye Exam: 1st eye exam  Dilated Previously: No, side effects of dilation explained today,info given to patient     What are you currently using to see?  does not use glasses or contacts    Distance Vision Acuity: Noticed gradual change in both eyes    Near Vision Acuity: Not satisfied     Eye Comfort: good  Do you use eye drops? : No  Occupation or Hobbies: computer    Siri Werner Optometric Assistant, A[employer]     Medical, surgical and family histories reviewed and updated 3/5/2018.       OBJECTIVE: See Ophthalmology exam    ASSESSMENT:    ICD-10-CM    1. Presbyopia H52.4 REFRACTION   2. Regular astigmatism of both eyes H52.223 REFRACTION   3. Prediabetes R73.03 EYE EXAM (SIMPLE-NONBILLABLE)      PLAN:    Dorian Husain aware  eye exam results will be sent to No Ref-Primary, Physician.  Patient Instructions   Recommend new glasses.  Your options are a bifocal which would allow you to see distance and near vision or separate glasses for reading.    Recommend returning for dilated fundus exam. It is a more thorough exam of the retina.  It is important to follow up on the glaucoma testing.    Omid Monzon, ANGIE             Again, thank you for allowing me to participate in the care of your patient.        Sincerely,        Omid Monzon, OD

## 2018-03-06 LAB — DEPRECATED CALCIDIOL+CALCIFEROL SERPL-MC: 29 UG/L (ref 20–75)

## 2018-03-06 NOTE — PROGRESS NOTES
Please send a letter:    Dear Dorian Husain    Here are your cholesterol results:    Your LDL is: Lab Results       Component                Value               Date                       LDL                      71                  03/02/2018          Your LDL goal is to be less than 130  Your HDL is: Lab Results       Component                Value               Date                       HDL                      43                  03/02/2018         Goal HDL is Greater than 40 (for men) or 50 (for women).  Your Triglycerides are: Lab Results       Component                Value               Date                       TRIG                     278                 03/02/2018       Goal TRIGLYCERIDES are less than 150.       Here are some ways to improve your cholesterol without medication:    Try to get at least 45 minutes of aerobic exercise 5-6 days a week  Maintain a healthy body weight  Eat less saturated fats  Buy lean cuts of meat, reduce your portions of red meat or substitute poultry or fish  Avoid fried or fast foods that are high in fat  Eat more fruits and vegetables    Basic blood count did not show anemia or infection in the blood.  Electrolytes, glucose, kidney function, liver function, thyroid function, iron stores, Vitamin B 12 and Vitamin D levels are normal.  Three month glucose is in the impaired range, this means that you do not have diabetes, but are at an increased risk of developing diabetes. Regular exercise will help prevent this.  Plan of care and follow up as discussed in clinic.   Please let me know if you have any questions and thank you for choosing [facility].    Regards,    Angelina Sharma MD MPH

## 2018-03-06 NOTE — PATIENT INSTRUCTIONS
Recommend new glasses.  Your options are a bifocal which would allow you to see distance and near vision or separate glasses for reading.    Recommend returning for dilated fundus exam. It is a more thorough exam of the retina.  It is important to follow up on the glaucoma testing.  (patient will call)    Omid Monzon OD

## 2018-03-08 ENCOUNTER — TELEPHONE (OUTPATIENT)
Dept: FAMILY MEDICINE | Facility: CLINIC | Age: 43
End: 2018-03-08

## 2018-03-08 NOTE — TELEPHONE ENCOUNTER
Letter was sent to patient on 3/6/18 with results. Results letter below:     March 6, 2018     Dear Dorian Husain     Here are your cholesterol results:     Your LDL is: Lab Results        Component                Value               Date                        LDL                      71                  03/02/2018          Your LDL goal is to be less than 130   Your HDL is: Lab Results        Component                Value               Date                        HDL                      43                  03/02/2018         Goal HDL is Greater than 40 (for men) or 50 (for women).   Your Triglycerides are: Lab Results        Component                Value               Date                        TRIG                     278                 03/02/2018       Goal TRIGLYCERIDES are less than 150.       Here are some ways to improve your cholesterol without medication:     Try to get at least 45 minutes of aerobic exercise 5-6 days a week   Maintain a healthy body weight   Eat less saturated fats   Buy lean cuts of meat, reduce your portions of red meat or substitute poultry or fish   Avoid fried or fast foods that are high in fat   Eat more fruits and vegetables     Basic blood count did not show anemia or infection in the blood.   Electrolytes, glucose, kidney function, liver function, thyroid function, iron stores, Vitamin B 12 and Vitamin D levels are normal.   Three month glucose is in the impaired range, this means that you do not have diabetes, but are at an increased risk of developing diabetes. Regular exercise will help prevent this.   Plan of care and follow up as discussed in clinic.   Please let me know if you have any questions and thank you for choosing [facility].     Regards,     Angelina Sharma MD MPH/smj

## 2018-03-08 NOTE — TELEPHONE ENCOUNTER
Patient called to report Blood pressure concerns. He stated that his blood pressure readings have been the last 2 days as follows: 154/90, 142/95, 142/85. He states that he can tell that the blood pressures are high because he does not feel well. He denies the following symptoms: nausea, vomiting, bloody nose, Chest pain, difficulty breathing, headaches, SOB, weakness and confusion. This RN advised patient that he should come into the clinic to be evaluated by a provider today. Patient refused to be seen by another provider other than Dr. Sharma and she did not have availability today. This RN highly encouraged patient to be seen by another provider today, but patient refused and stated he would only be seen by Dr. Sharma. This RN assisted in scheduling an appointment for him with Dr. Sharma tomorrow a.m.      This RN advised patient that if he has following symptoms prior to scheduled appointment tomorrow he needs to present to emergency room: nausea, vomiting, bloody nose, Chest pain, difficulty breathing, headaches, SOB, weakness, confusion, numbness, tingling and he verbalized understanding.  Guideline used:guidance from Telephone Triage Protocols for Nurses, Fifth Edition, Sasha Abernathy RN

## 2018-03-08 NOTE — TELEPHONE ENCOUNTER
Reason for Call:  Request for results:    Name of test or procedure: Hemoglobin A1C, Ferritin, Comprehensive Metabolic Panel, CBC with Platelets, TSH with Free T4 Reflex, Vitamin B12, Vitamin D Deficiency Screening, Lipid Reflex to Direct LDL Panel    Date of test of procedure: 03/02/18    Location of the test or procedure: [facility]    OK to leave the result message on voice mail or with a family member? YES    Phone number Patient can be reached at:  Home number on file [phone] (home)    Additional comments: Pt calling for he came in for a lab apt on 03/02/18 and would like a call back to discuss results.    Call taken on 3/8/2018 at 10:38 AM by Werner Childers

## 2018-03-09 ENCOUNTER — OFFICE VISIT (OUTPATIENT)
Dept: FAMILY MEDICINE | Facility: CLINIC | Age: 43
End: 2018-03-09
Payer: COMMERCIAL

## 2018-03-09 VITALS
SYSTOLIC BLOOD PRESSURE: 126 MMHG | OXYGEN SATURATION: 98 % | BODY MASS INDEX: 23.57 KG/M2 | HEIGHT: 67 IN | WEIGHT: 150.2 LBS | HEART RATE: 81 BPM | TEMPERATURE: 98.3 F | DIASTOLIC BLOOD PRESSURE: 70 MMHG

## 2018-03-09 DIAGNOSIS — F17.200 TOBACCO USE DISORDER: ICD-10-CM

## 2018-03-09 DIAGNOSIS — I10 HTN, GOAL BELOW 140/90: Primary | ICD-10-CM

## 2018-03-09 DIAGNOSIS — R73.03 PREDIABETES: ICD-10-CM

## 2018-03-09 DIAGNOSIS — E78.2 MIXED HYPERLIPIDEMIA: ICD-10-CM

## 2018-03-09 PROCEDURE — 99214 OFFICE O/P EST MOD 30 MIN: CPT | Performed by: PREVENTIVE MEDICINE

## 2018-03-09 RX ORDER — HYDROCHLOROTHIAZIDE 25 MG/1
25 TABLET ORAL DAILY
Qty: 90 TABLET | Refills: 1 | Status: SHIPPED | OUTPATIENT
Start: 2018-03-09 | End: 2018-05-23

## 2018-03-09 NOTE — PROGRESS NOTES
SUBJECTIVE:   Dorian Husain is a 42 year old male who presents to clinic today for the following health issues:    Hypertension Follow-up      Outpatient blood pressures are being checked at home.  Results are 150s systolic    Low Salt Diet: low salt      Amount of exercise or physical activity: None    Problems taking medications regularly: No    Medication side effects: none  Diet: low salt  Readings have been better since he has been taking hs wife's HCTZ 12.5 mg daily  Tolerating well without side effects  No dizziness or cramping in the legs     The 10-year ASCVD risk score (Reycheikh LIVINGSTON Jr, et al., 2013) is: 3.8%    Values used to calculate the score:      Age: 42 years      Sex: Male      Is Non- : No      Diabetic: No      Tobacco smoker: Yes      Systolic Blood Pressure: 126 mmHg      Is BP treated: No      HDL Cholesterol: 43 mg/dL      Total Cholesterol: 170 mg/dL    Hyperlipidemia Follow-Up      Rate your low fat/cholesterol diet?: fair    Taking statin?  Stopped due to last Lipid panel had improved    Other lipid medications/supplements?:  none      Problem list and histories reviewed & adjusted, as indicated.  Additional history: as documented    Patient Active Problem List   Diagnosis     Family history of ischemic heart disease     Mixed hyperlipidemia     Prediabetes     History reviewed. No pertinent surgical history.    Social History   Substance Use Topics     Smoking status: Light Tobacco Smoker     Smokeless tobacco: Never Used     Alcohol use Not on file     Family History   Problem Relation Age of Onset     CEREBROVASCULAR DISEASE Father          Current Outpatient Prescriptions   Medication Sig Dispense Refill     hydrochlorothiazide (HYDRODIURIL) 25 MG tablet Take 1 tablet (25 mg) by mouth daily 90 tablet 1     metFORMIN (GLUCOPHAGE-XR) 500 MG 24 hr tablet TAKE 2 TABLETS (1,000 MG) BY MOUTH DAILY.  2     No Known Allergies  BP Readings from Last 3 Encounters:  "  03/09/18 126/70   02/28/18 150/80   12/08/17 126/75    Wt Readings from Last 3 Encounters:   03/09/18 150 lb 3.2 oz (68.1 kg)   02/28/18 148 lb (67.1 kg)   12/08/17 149 lb (67.6 kg)                  Labs reviewed in EPIC    Reviewed and updated as needed this visit by clinical staff  Tobacco  Allergies  Meds  Med Hx  Surg Hx  Fam Hx  Soc Hx      Reviewed and updated as needed this visit by Provider         ROS:  Constitutional, neuro, ENT, endocrine, pulmonary, cardiac, gastrointestinal, genitourinary, musculoskeletal, integument and psychiatric systems are negative, except as otherwise noted.    OBJECTIVE:                                                    /70 (BP Location: Left arm, Patient Position: Chair, Cuff Size: Adult Large)  Pulse 81  Temp 98.3  F (36.8  C) (Oral)  Ht 5' 6.53\" (1.69 m)  Wt 150 lb 3.2 oz (68.1 kg)  SpO2 98%  BMI 23.85 kg/m2  Body mass index is 23.85 kg/(m^2).  GENERAL APPEARANCE: healthy, alert and no distress  EYES: Eyes grossly normal to inspection and conjunctivae and sclerae normal  NECK: no adenopathy, no asymmetry, masses, or scars and trachea midline and normal to palpation  RESP: lungs clear to auscultation - no rales, rhonchi or wheezes  CV: regular rates and rhythm, normal S1 S2, no S3 or S4 and no murmur, click or rub  ABDOMEN: soft, non-tender  MS: extremities normal- no gross deformities noted  SKIN: no suspicious lesions or rashes  NEURO: Normal strength and tone, mentation intact and speech normal  PSYCH: mentation appears normal and affect normal/bright    Diagnostic test results:  Diagnostic Test Results:  Results for orders placed or performed in visit on 03/02/18   Lipid panel reflex to direct LDL Fasting   Result Value Ref Range    Cholesterol 170 <200 mg/dL    Triglycerides 278 (H) <150 mg/dL    HDL Cholesterol 43 >39 mg/dL    LDL Cholesterol Calculated 71 <100 mg/dL    Non HDL Cholesterol 127 <130 mg/dL   Vitamin D Deficiency   Result Value Ref Range "    Vitamin D Deficiency screening 29 20 - 75 ug/L   Vitamin B12   Result Value Ref Range    Vitamin B12 557 193 - 986 pg/mL   TSH with free T4 reflex   Result Value Ref Range    TSH 1.76 0.40 - 4.00 mU/L   CBC with platelets   Result Value Ref Range    WBC 6.6 4.0 - 11.0 10e9/L    RBC Count 4.85 4.4 - 5.9 10e12/L    Hemoglobin 13.5 13.3 - 17.7 g/dL    Hematocrit 41.3 40.0 - 53.0 %    MCV 85 78 - 100 fl    MCH 27.8 26.5 - 33.0 pg    MCHC 32.7 31.5 - 36.5 g/dL    RDW 14.4 10.0 - 15.0 %    Platelet Count 271 150 - 450 10e9/L   Comprehensive metabolic panel   Result Value Ref Range    Sodium 137 133 - 144 mmol/L    Potassium 4.0 3.4 - 5.3 mmol/L    Chloride 101 94 - 109 mmol/L    Carbon Dioxide 27 20 - 32 mmol/L    Anion Gap 9 3 - 14 mmol/L    Glucose 93 70 - 99 mg/dL    Urea Nitrogen 12 7 - 30 mg/dL    Creatinine 0.88 0.66 - 1.25 mg/dL    GFR Estimate >90 >60 mL/min/1.7m2    GFR Estimate If Black >90 >60 mL/min/1.7m2    Calcium 9.0 8.5 - 10.1 mg/dL    Bilirubin Total 0.4 0.2 - 1.3 mg/dL    Albumin 4.5 3.4 - 5.0 g/dL    Protein Total 7.7 6.8 - 8.8 g/dL    Alkaline Phosphatase 63 40 - 150 U/L    ALT 37 0 - 70 U/L    AST 20 0 - 45 U/L   Ferritin   Result Value Ref Range    Ferritin 190 26 - 388 ng/mL   Hemoglobin A1c   Result Value Ref Range    Hemoglobin A1C 5.7 4.3 - 6.0 %        ASSESSMENT/PLAN:                                                    1. HTN, goal below 140/90  -Has been taking his wife's medication   -Continue monitoring Blood pressure   - hydrochlorothiazide (HYDRODIURIL) 25 MG tablet; Take 1 tablet (25 mg) by mouth daily  Dispense: 90 tablet; Refill: 1    2. Tobacco use disorder  -Smoke 3-4 cigarettes per day  -Plans to quit     3. Prediabetes  -HbA1C at 5.7  -Already on Metformin  -Discussed importance of lifestyle modifications in detail, goal of 150 minutes of moderate physical activity per week     4. Mixed hyperlipidemia  -LDL at 71  -Can defer statin for now  -Await results of Stress test (still has  to schedule)      Follow up with Provider - If stress test normal, then follow up with me in 6 months, sooner if any changes or concerns    Scheduled on Ancillary for Blood pressure check, wants to make hawk his home machine is matching up with ours.     Angelina Sharma MD MPH    [facility]

## 2018-03-09 NOTE — PATIENT INSTRUCTIONS
At [facility], we strive to deliver an exceptional experience to you, every time we see you.  If you receive a survey in the mail, please send us back your thoughts. We really do value your feedback.    Based on your medical history, these are the current health maintenance/preventive care services that you are due for (some may have been done at this visit.)  Health Maintenance Due   Topic Date Due     TOBACCO CESSATION COUNSELING Q1 YR  1975         Suggested websites for health information:  Www.[email or web address] : Up to date and easily searchable information on multiple topics.  Www.medlineplus.gov : medication info, interactive tutorials, watch real surgeries online  Www.familydoctor.org : good info from the Academy of Family Physicians  Www.cdc.gov : public health info, travel advisories, epidemics (H1N1)  Www.aap.org : children's health info, normal development, vaccinations  Www.health[facility].us : [place] dept of health, public health issues in [place], N1N1    Your care team:                            Family Medicine Internal Medicine   MD Dani Phelan MD Shantel Branch-Fleming, MD Katya Georgiev PA-C Nam Ho, MD Pediatrics   LUANA Mendez, CNP Wendy ORLANDO CNP   MD Liz Meraz MD Deborah Mielke, MD Kim Thein, APRN CNP      Clinic hours: Monday - Thursday 7 am-7 pm; Fridays 7 am-5 pm.   Urgent care: Monday - Friday 11 am-9 pm; Saturday and Sunday 9 am-5 pm.  Pharmacy : Monday -Thursday 8 am-8 pm; Friday 8 am-6 pm; Saturday and Sunday 9 am-5 pm.     Clinic: [phone]   Pharmacy: [phone]    HOW TO QUIT SMOKING  Smoking is one of the hardest habits to break. About half of all those who have ever smoked have been able to quit, and most of those (about 70%) who still smoke want to quit. Here are some of the best ways to stop smoking.     KEEP TRYING:  It takes most smokers about 8 tries before they are  finally able to fully quit. So, the more often you try and fail, the better your chance of quitting the next time! So, don't give up!    GO COLD TURKEY:  Most ex-smokers quit cold turkey. Trying to cut back gradually doesn't seem to work as well, perhaps because it continues the smoking habit. Also, it is possible to fool yourself by inhaling more while smoking fewer cigarettes. This results in the same amount of nicotine in your body!    GET SUPPORT:  Support programs can make an important difference, especially for the heavy smoker. These groups offer lectures, methods to change your behavior and peer support. Call the free national Quitline for more information. 800-QUIT-NOW (878-324-5514). Low-cost or free programs are offered by many hospitals, local chapters of the American Lung Association (983-194-1861) and the American Cancer Society (403-486-2724). Support at home is important too. Non-smokers can help by offering praise and encouragement. If the smoker fails to quit, encourage them to try again!    OVER-THE-COUNTER MEDICINES:  For those who can't quit on their own, Nicotine Replacement Therapy (NRT) may make quitting much easier. Certain aids such as the nicotine patch, gum and lozenge are available without a prescription. However, it is best to use these under the guidance of your doctor. The skin patch provides a steady supply of nicotine to the body. Nicotine gum and lozenge gives temporary bursts of low levels of nicotine. Both methods take the edge off the craving for cigarettes. WARNING: If you feel symptoms of nicotine overdose, such as nausea, vomiting, dizziness, weakness, or fast heartbeat, stop using these and see your doctor.    PRESCRIPTION MEDICINES:  After evaluating your smoking patterns and prior attempts at quitting, your doctor may offer a prescription medicine such as bupropion (Zyban, Wellbutrin), varenicline (Chantix, Champix), a niocotine inhaler or nasal spray. Each has its unique  advantage and side effects which your doctor can review with you.    HEALTH BENEFITS OF QUITTING:  The benefits of quitting start right away and keep improving the longer you go without smokin minutes: blood pressure and pulse return to normal  8 hours: oxygen levels return to normal  2 days: ability to smell and taste begins to improve as damaged nerves start to regrow  2-3 weeks: circulation and lung function improves  1-9 months: decreased cough, congestion and shortness of breath; less tired  1 year: risk of heart attack decreases by half  5 years: risk of lung cancer decreases by half; risk of stroke becomes the same as a non-smoker  For information about how to quit smoking, visit the following links:  National [employer] [place] ,   Clearing the Air, Quit Smoking Today   - an online booklet. http://www.smokefree.gov/pubs/clearing_the_air.pdf  Smokefree.gov http://smokefree.gov/  QuitNet http://www.quitnet.com/    7538-5097 [place] [address]. All rights reserved. This information is not intended as a substitute for professional medical care. Always follow your healthcare professional's instructions.    The Benefits of Living Smoke Free  What do you want to gain from quitting? Check off some reasons to quit.  Health Benefits  ___ Reduce my risk of lung cancer, heart disease, chronic lung disease  ___ Have fewer wrinkles and softer skin  ___ Improve my sense of taste and smell  ___ For pregnant women--reduce the risk of having a miscarriage, stillbirth, premature birth, or low-birth-weight baby  Personal Benefits  ___ Feel more in control of my life  ___ Have better-smelling hair, breath, clothes, home, and car  ___ Save time by not having to take smoke breaks, buy cigarettes, or hunt for a light  ___ Have whiter teeth  Family Benefits  ___ Reduce my children s respiratory tract infections  ___ Set a good example for my children  ___ Reduce my family s cancer  risk  Financial Benefits  ___ Save hundreds of dollars each year that would be spent on cigarettes  ___ Save money on medical bills  ___ Save on life, health, and car insurance premiums    Those Dollars Add Up!  Cigarettes are expensive, and getting more expensive all the time. Do you realize how much money you are spending on cigarettes per year? What is the average amount you spend on a pack of cigarettes? What is the average number of packs that you smoke per day? Using your answers to these questions, fill in this formula to help you find out:  ($ _____ per pack) ×  ( _____ number of packs per day) × (365 days) =  $ _____ yearly cost of smoking  Besides tobacco, there are other costs, including extra cleaning bills and replacement costs for clothing and furniture; medical expenses for smoking-related illnesses; and higher health, life, and car insurance premiums.    Cigars and Pipes Count Too!  Cigars and pipes are also dangerous. So are smokeless (chewing) tobacco and snuff. All of these products contain nicotine, a highly addictive substance that has harmful effects on your body. Quitting smoking means giving up all tobacco products.      5461-8363 [facility], [address] [place] [address], [place] [address]. All rights reserved. This information is not intended as a substitute for professional medical care. Always follow your healthcare professional's instructions.

## 2018-03-09 NOTE — MR AVS SNAPSHOT
After Visit Summary   3/9/2018    Droian Husain    MRN: [id number]           Patient Information     Date Of Birth          1975        Visit Information        Provider Department      3/9/2018 7:40 AM Angelina Sharma MD [facility]        Today's Diagnoses     HTN, goal below 140/90    -  1    Tobacco use disorder        Prediabetes        Mixed hyperlipidemia          Care Instructions    At [facility], we strive to deliver an exceptional experience to you, every time we see you.  If you receive a survey in the mail, please send us back your thoughts. We really do value your feedback.    Based on your medical history, these are the current health maintenance/preventive care services that you are due for (some may have been done at this visit.)  Health Maintenance Due   Topic Date Due     TOBACCO CESSATION COUNSELING Q1 YR  1975         Suggested websites for health information:  WwwShoefitr : Up to date and easily searchable information on multiple topics.  Www.Blekko.gov : medication info, interactive tutorials, watch real surgeries online  Www.familydoctor.org : good info from the Academy of Family Physicians  Www.cdc.gov : public health info, travel advisories, epidemics (H1N1)  Www.aap.org : children's health info, normal development, vaccinations  Www.health.[facility].[place].us : [place] dept of health, public health issues in [place], N1N1    Your care team:                            Family Medicine Internal Medicine   MD Dani Phelan MD Shantel Branch-Fleming, MD Katya Georgiev PA-C Nam Ho, MD Pediatrics   LUANA Mendez, RICKEY Hughes APRN CNP   MD Liz Meraz MD Deborah Mielke, MD Kim Thein, JOHANNY CNP      Clinic hours: Monday - Thursday 7 am-7 pm; Fridays 7 am-5 pm.   Urgent care: Monday - Friday 11 am-9 pm; Saturday and Sunday 9 am-5 pm.  Pharmacy : Monday -Thursday 8  am-8 pm; Friday 8 am-6 pm; Saturday and Sunday 9 am-5 pm.     Clinic: [phone]   Pharmacy: [phone]    HOW TO QUIT SMOKING  Smoking is one of the hardest habits to break. About half of all those who have ever smoked have been able to quit, and most of those (about 70%) who still smoke want to quit. Here are some of the best ways to stop smoking.     KEEP TRYING:  It takes most smokers about 8 tries before they are finally able to fully quit. So, the more often you try and fail, the better your chance of quitting the next time! So, don't give up!    GO COLD TURKEY:  Most ex-smokers quit cold turkey. Trying to cut back gradually doesn't seem to work as well, perhaps because it continues the smoking habit. Also, it is possible to fool yourself by inhaling more while smoking fewer cigarettes. This results in the same amount of nicotine in your body!    GET SUPPORT:  Support programs can make an important difference, especially for the heavy smoker. These groups offer lectures, methods to change your behavior and peer support. Call the free national Quitline for more information. 800-QUIT-NOW (981-902-5175). Low-cost or free programs are offered by many hospitals, local chapters of the American Lung Association (321-077-3816) and the American Cancer Society (148-251-3461). Support at home is important too. Non-smokers can help by offering praise and encouragement. If the smoker fails to quit, encourage them to try again!    OVER-THE-COUNTER MEDICINES:  For those who can't quit on their own, Nicotine Replacement Therapy (NRT) may make quitting much easier. Certain aids such as the nicotine patch, gum and lozenge are available without a prescription. However, it is best to use these under the guidance of your doctor. The skin patch provides a steady supply of nicotine to the body. Nicotine gum and lozenge gives temporary bursts of low levels of nicotine. Both methods take the edge off the craving for  cigarettes. WARNING: If you feel symptoms of nicotine overdose, such as nausea, vomiting, dizziness, weakness, or fast heartbeat, stop using these and see your doctor.    PRESCRIPTION MEDICINES:  After evaluating your smoking patterns and prior attempts at quitting, your doctor may offer a prescription medicine such as bupropion (Zyban, Wellbutrin), varenicline (Chantix, Champix), a niocotine inhaler or nasal spray. Each has its unique advantage and side effects which your doctor can review with you.    HEALTH BENEFITS OF QUITTING:  The benefits of quitting start right away and keep improving the longer you go without smokin minutes: blood pressure and pulse return to normal  8 hours: oxygen levels return to normal  2 days: ability to smell and taste begins to improve as damaged nerves start to regrow  2-3 weeks: circulation and lung function improves  1-9 months: decreased cough, congestion and shortness of breath; less tired  1 year: risk of heart attack decreases by half  5 years: risk of lung cancer decreases by half; risk of stroke becomes the same as a non-smoker  For information about how to quit smoking, visit the following links:  National Cancer Mobile ,   Clearing the Air, Quit Smoking Today   - an online booklet. http://www.smokefree.gov/pubs/clearing_the_air.pdf  Smokefree.gov http://smokefree.gov/  QuitNet http://www.quitnet.com/    7560-4116 [employer], [address] [place] [address]. All rights reserved. This information is not intended as a substitute for professional medical care. Always follow your healthcare professional's instructions.    The Benefits of Living Smoke Free  What do you want to gain from quitting? Check off some reasons to quit.  Health Benefits  ___ Reduce my risk of lung cancer, heart disease, chronic lung disease  ___ Have fewer wrinkles and softer skin  ___ Improve my sense of taste and smell  ___ For pregnant women--reduce the risk of having a  miscarriage, stillbirth, premature birth, or low-birth-weight baby  Personal Benefits  ___ Feel more in control of my life  ___ Have better-smelling hair, breath, clothes, home, and car  ___ Save time by not having to take smoke breaks, buy cigarettes, or hunt for a light  ___ Have whiter teeth  Family Benefits  ___ Reduce my children s respiratory tract infections  ___ Set a good example for my children  ___ Reduce my family s cancer risk  Financial Benefits  ___ Save hundreds of dollars each year that would be spent on cigarettes  ___ Save money on medical bills  ___ Save on life, health, and car insurance premiums    Those Dollars Add Up!  Cigarettes are expensive, and getting more expensive all the time. Do you realize how much money you are spending on cigarettes per year? What is the average amount you spend on a pack of cigarettes? What is the average number of packs that you smoke per day? Using your answers to these questions, fill in this formula to help you find out:  ($ _____ per pack) ×  ( _____ number of packs per day) × (365 days) =  $ _____ yearly cost of smoking  Besides tobacco, there are other costs, including extra cleaning bills and replacement costs for clothing and furniture; medical expenses for smoking-related illnesses; and higher health, life, and car insurance premiums.    Cigars and Pipes Count Too!  Cigars and pipes are also dangerous. So are smokeless (chewing) tobacco and snuff. All of these products contain nicotine, a highly addictive substance that has harmful effects on your body. Quitting smoking means giving up all tobacco products.      1009-4260 [facility], [address] [place] [address]. All rights reserved. This information is not intended as a substitute for professional medical care. Always follow your healthcare professional's instructions.          Follow-ups after your visit        Your next 10 appointments already scheduled     Mar 30, 2018  7:40 AM CDT  "  Nurse Only with [facility]   [facility]    [address] [facility] [address] [phone]              Who to contact     If you have questions or need follow up information about today's clinic visit or your schedule please contact [facility] directly at [phone].  Normal or non-critical lab and imaging results will be communicated to you by MyChart, letter or phone within 4 business days after the clinic has received the results. If you do not hear from us within 7 days, please contact the clinic through [employer] or phone. If you have a critical or abnormal lab result, we will notify you by phone as soon as possible.  Submit refill requests through [employer] or call your pharmacy and they will forward the refill request to us. Please allow 3 business days for your refill to be completed.          Additional Information About Your Visit        [employer] Information     [employer] lets you send messages to your doctor, view your test results, renew your prescriptions, schedule appointments and more. To sign up, go to [email or web address] . Click on \"Log in\" on the left side of the screen, which will take you to the Welcome page. Then click on \"Sign up Now\" on the right side of the page.     You will be asked to enter the access code listed below, as well as some personal information. Please follow the directions to create your username and password.     Your access code is: [email or web address]  Expires: 2018  8:23 AM     Your access code will  in 90 days. If you need help or a new code, please call your [facility] clinic or [phone].        Care EveryWhere ID     This is your Care EveryWhere ID. This could be used by other organizations to access your [facility] medical records  [id number]        Your Vitals Were     Pulse Temperature Height Pulse Oximetry BMI (Body Mass Index)       81 98.3  F (36.8  C) (Oral) 5' " "6.53\" (1.69 m) 98% 23.85 kg/m2        Blood Pressure from Last 3 Encounters:   03/09/18 126/70   02/28/18 150/80   12/08/17 126/75    Weight from Last 3 Encounters:   03/09/18 150 lb 3.2 oz (68.1 kg)   02/28/18 148 lb (67.1 kg)   12/08/17 149 lb (67.6 kg)              Today, you had the following     No orders found for display         Today's Medication Changes          These changes are accurate as of 3/9/18  8:23 AM.  If you have any questions, ask your nurse or doctor.               Start taking these medicines.        Dose/Directions    hydrochlorothiazide 25 MG tablet   Commonly known as:  HYDRODIURIL   Used for:  HTN, goal below 140/90   Started by:  Angelina Sharma MD        Dose:  25 mg   Take 1 tablet (25 mg) by mouth daily   Quantity:  90 tablet   Refills:  1            Where to get your medicines      These medications were sent to [place] [address] [place] [facility] [place], [place] - [address]  [address] [place] [address]     Phone:  [phone]     hydrochlorothiazide 25 MG tablet                Primary Care Provider Fax #    Physician No Ref-Primary [phone]       No address on file        Equal Access to Services     KAREN LOGAN : Lupe guerreroo Soadam, waaxda luqadaha, qaybta kaalmada adeegyada, riccardo crowder. So [facility] [phone].    ATENCIÓN: Si habla español, tiene a cruz disposición servicios gratuitos de asistencia lingüística. Llame al [phone].    We comply with applicable federal civil rights laws and [place] laws. We do not discriminate on the basis of race, color, national origin, age, disability, sex, sexual orientation, or gender identity.            Thank you!     Thank you for choosing [facility]  for your care. Our goal is always to provide you with excellent care. Hearing back from our patients is one way we can continue to improve our services. Please take a few minutes to complete the written survey that you may " receive in the mail after your visit with us. Thank you!             Your Updated Medication List - Protect others around you: Learn how to safely use, store and throw away your medicines at www.disposemymeds.org.          This list is accurate as of 3/9/18  8:23 AM.  Always use your most recent med list.                   Brand Name Dispense Instructions for use Diagnosis    hydrochlorothiazide 25 MG tablet    HYDRODIURIL    90 tablet    Take 1 tablet (25 mg) by mouth daily    HTN, goal below 140/90       metFORMIN 500 MG 24 hr tablet    GLUCOPHAGE-XR     TAKE 2 TABLETS (1,000 MG) BY MOUTH DAILY.

## 2018-03-16 ENCOUNTER — TELEPHONE (OUTPATIENT)
Dept: GENERAL RADIOLOGY | Facility: CLINIC | Age: 43
End: 2018-03-16

## 2018-03-16 NOTE — TELEPHONE ENCOUNTER
Called pt prior to stress echocardiogram, scheduled for 3/19/18 to discuss necessary preparation and to assess for any questions or concerns pt may have. No answer. Left message with contact information for pt to return call.

## 2018-03-19 ENCOUNTER — RADIANT APPOINTMENT (OUTPATIENT)
Dept: CARDIOLOGY | Facility: CLINIC | Age: 43
End: 2018-03-19
Payer: COMMERCIAL

## 2018-03-19 VITALS — SYSTOLIC BLOOD PRESSURE: 143 MMHG | HEART RATE: 62 BPM | DIASTOLIC BLOOD PRESSURE: 85 MMHG

## 2018-03-19 DIAGNOSIS — Z82.49 FAMILY HISTORY OF ISCHEMIC HEART DISEASE: ICD-10-CM

## 2018-03-19 PROCEDURE — 93350 STRESS TTE ONLY: CPT | Mod: TC

## 2018-03-19 PROCEDURE — 93325 DOPPLER ECHO COLOR FLOW MAPG: CPT | Mod: TC

## 2018-03-19 PROCEDURE — 93321 DOPPLER ECHO F-UP/LMTD STD: CPT | Mod: 26 | Performed by: INTERNAL MEDICINE

## 2018-03-19 PROCEDURE — 93350 STRESS TTE ONLY: CPT | Mod: 26 | Performed by: INTERNAL MEDICINE

## 2018-03-19 PROCEDURE — 93325 DOPPLER ECHO COLOR FLOW MAPG: CPT | Mod: 26 | Performed by: INTERNAL MEDICINE

## 2018-03-19 PROCEDURE — 93016 CV STRESS TEST SUPVJ ONLY: CPT

## 2018-03-19 PROCEDURE — 93321 DOPPLER ECHO F-UP/LMTD STD: CPT | Mod: TC

## 2018-03-19 PROCEDURE — 93352 ADMIN ECG CONTRAST AGENT: CPT

## 2018-03-19 PROCEDURE — 93018 CV STRESS TEST I&R ONLY: CPT | Performed by: INTERNAL MEDICINE

## 2018-03-19 RX ADMIN — Medication 10 ML: at 09:15

## 2018-03-19 NOTE — PROGRESS NOTES
Patient presents today for stress echo ordered by MD. Prior to patient visit, chart prep by CMA done including confirmation of order, medications reviewed for contraindications, reviewed previous EKG's for trends & concerns and reviewed patient's medical history.    IV started in R AC with a 22G Jeclo Catheter.     Echo technician completed resting portion of echo.    Stress portion of echo completed utilizing bike and pictures taken at peak.  Blood pressure taken every 2 minutes and documented in Muse system.    Difinity medication used 5cc, wasted 5cc Definity NDC # 11544-739-99 (1.5ml Definity mixed with 8.5ml Saline )  Atropine medication given  0.4 Atropine NDC# 37138-154-91     Patient offered complaints of: Tired, leg fatigue    After completion of stress echo, recovery period with blood pressure monitoring occurs at 1, 3 and 5 minutes and documented in Muse.  IV removed and water provided to patient.    Patient education provided about cardiology interpretation and primary provider will be notified of results.    Dr. Bui provided supervision of the tests performed today.    Shruthi Honeycutt, CCT, Cardiac CMA

## 2018-03-19 NOTE — LETTER
[facility]  [address] [facility] [address]  [phone]                                                                                                           Patito Husain  [address] [facility] [address]    2018    Dear Patito Husain,     Stress test for blockages in the heart did not show any abnormalities. Plan of care and follow up as discussed in clinic. Please let me know if you have any questions and thank you for choosing [facility].     RegardsAngelina MD MPH/smj    Results for orders placed or performed in visit on 18   Echo Stress Test With Definity    Narrative    [id number]  [id number]  [id number]  [id number]^ANGELIC^ANGELINA^           [facility]  Echocardiography Laboratory  [address] [place] [address]        Name: PATITO HUSAIN  MRN: [id number]  : 1975  Study Date: 2018 08:56 AM  Age: 42 yrs  Gender: Male  Patient Location: [facility]  Reason For Study: , Family history of ischemic heart disease  Ordering Physician: ANGELINA ATWOOD  Referring Physician: ANGELINA ATWOOD  Performed By: Elizabeth Rosas RDCS     BSA: 1.8 m2  Height: 66 in  Weight: 150 lb  BP: 143/85 mmHg  _____________________________________________________________________________  __        Procedure  Stress Echo Bike with two dimensional, color and spectral Doppler performed.  Contrast Definity.  _____________________________________________________________________________  __        Interpretation Summary     Conclusion:  Negative exercise echocardiogram at a diagnostic level of peak stress (92%  MPHR).     Patient developed no chest pain.  Test stopped due to target achieved.  Normal functional capacity.  No regional wall motion abnormalities at rest or with stress.  Normal resting LV function with EF of approximately 60-65%; normal response to  exercise with increase to approximately 70-75%.  Hypertensive  blood pressure response to exercise.  No ECG evidence of ischemia.  No significant valvular disease noted on routine screening color flow Doppler  and pulsed Doppler examination.  The ascending aortic segment is normal.  _____________________________________________________________________________  __     Stress  Definity (NDC #35428-104-70) given intravenously.  Patient was given 5ml mixture of 1.5ml Definity and 8.5ml saline.  5 ml wasted.  IV start location RAC .  The maximum dose of atropine was 0.4mg.  Maximum workload 125 perez.  Target Heart Rate was achieved.  Exercise was stopped due to fatigue.  The patient did not exhibit any symptoms during exercise.  Normal blood pressure response with stress.  Normal heart rate response to exercise.     Stress Results                                       Maximum Predicted HR:   178 bpm             Target HR: 151 bpm        % Maximum Predicted HR: 92 %                             StageDurationHeart Rate  BP                                (mm:ss)   (bpm)                           REST  0:00      62    143/85                           PEAK  6:44      164   226/95                             Stress Duration:   6:44 mm:ss                       Maximum Stress HR: 164 bpm  _____________________________________________________________________________  __           Doppler Measurements & Calculations  MV E max farhad: 77.1 cm/sec  MV A max farhad: 46.7 cm/sec  MV E/A: 1.7  MV dec time: 0.21 sec              _____________________________________________________________________________  __        Report approved by: Paul Funk 03/19/2018 10:06 AM

## 2018-03-20 NOTE — PROGRESS NOTES
Please send a letter:    Dear Dorian Husain,    Stress test for blockages in the heart did not show any abnormalities. Plan of care and follow up as discussed in clinic. Please let me know if you have any questions and thank you for choosing [facility].    Regards,    Angelina Sharma MD MPH

## 2018-04-17 ENCOUNTER — TELEPHONE (OUTPATIENT)
Dept: OPTOMETRY | Facility: CLINIC | Age: 43
End: 2018-04-17

## 2018-04-17 NOTE — TELEPHONE ENCOUNTER
----- Message from Omid Monzon OD sent at 4/17/2018  8:43 AM CDT -----  Lefty Mejia, Could you please call this patient to schedule a dilated fundus exam and document in a telephone encounter.  Thank you!      4/17/18    Called patient and let him know that I scheduled him for May 7th at 11:40am per Omid Monzon's request and left him a message that if that did not work for him to give me a call to change it at [phone].    Ramsey Mar  Clinical

## 2018-05-23 ENCOUNTER — OFFICE VISIT (OUTPATIENT)
Dept: FAMILY MEDICINE | Facility: CLINIC | Age: 43
End: 2018-05-23
Payer: COMMERCIAL

## 2018-05-23 VITALS
DIASTOLIC BLOOD PRESSURE: 80 MMHG | SYSTOLIC BLOOD PRESSURE: 130 MMHG | BODY MASS INDEX: 23.46 KG/M2 | OXYGEN SATURATION: 99 % | HEIGHT: 66 IN | HEART RATE: 78 BPM | TEMPERATURE: 98.4 F | WEIGHT: 146 LBS

## 2018-05-23 DIAGNOSIS — F17.200 TOBACCO USE DISORDER: ICD-10-CM

## 2018-05-23 DIAGNOSIS — L84 CORNS AND CALLUS: ICD-10-CM

## 2018-05-23 DIAGNOSIS — R73.03 PREDIABETES: Primary | ICD-10-CM

## 2018-05-23 DIAGNOSIS — L81.0 POST-INFLAMMATORY HYPERPIGMENTATION: ICD-10-CM

## 2018-05-23 DIAGNOSIS — I10 HTN, GOAL BELOW 140/90: ICD-10-CM

## 2018-05-23 DIAGNOSIS — L82.0 INFLAMED SEBORRHEIC KERATOSIS: ICD-10-CM

## 2018-05-23 PROCEDURE — 99214 OFFICE O/P EST MOD 30 MIN: CPT | Performed by: PREVENTIVE MEDICINE

## 2018-05-23 RX ORDER — LISINOPRIL 10 MG/1
10 TABLET ORAL DAILY
Qty: 90 TABLET | Refills: 1 | Status: SHIPPED | OUTPATIENT
Start: 2018-05-23 | End: 2018-07-06 | Stop reason: SINTOL

## 2018-05-23 RX ORDER — METFORMIN HCL 500 MG
TABLET, EXTENDED RELEASE 24 HR ORAL
Qty: 180 TABLET | Refills: 1 | Status: SHIPPED | OUTPATIENT
Start: 2018-05-23 | End: 2018-11-23

## 2018-05-23 ASSESSMENT — PAIN SCALES - GENERAL: PAINLEVEL: NO PAIN (0)

## 2018-05-23 NOTE — MR AVS SNAPSHOT
After Visit Summary   5/23/2018    Dorian Husain    MRN: [id number]           Patient Information     Date Of Birth          1975        Visit Information        Provider Department      5/23/2018 6:20 PM Angelina Sharma MD [facility]        Today's Diagnoses     Prediabetes    -  1    Tobacco use disorder        HTN, goal below 140/90        Post-inflammatory hyperpigmentation        Corns and callus          Care Instructions    At [facility], we strive to deliver an exceptional experience to you, every time we see you.  If you receive a survey in the mail, please send us back your thoughts. We really do value your feedback.    Based on your medical history, these are the current health maintenance/preventive care services that you are due for (some may have been done at this visit.)  Health Maintenance Due   Topic Date Due     TOBACCO CESSATION COUNSELING Q1 YR  1975     HIV SCREEN (SYSTEM ASSIGNED)  08/01/1993       Suggested websites for health information:  Www.[employer] : Up to date and easily searchable information on multiple topics.  Www.medlineplus.gov : medication info, interactive tutorials, watch real surgeries online  Www.familydoctor.org : good info from the Academy of Family Physicians  Www.cdc.gov : public health info, travel advisories, epidemics (H1N1)  Www.aap.org : children's health info, normal development, vaccinations  Www.health.state[place] : [place] dept of health, public health issues in [place], N1N1    Your care team:                            Family Medicine Internal Medicine   MD Dani Phelan MD Shantel Branch-Fleming, MD Katya Georgiev PA-C Megan Hill, JOHANNY Vincent MD Pediatrics   LUANA Mendez, MD Wendy Jernigan APRN CNP   MD Liz Meraz MD Deborah Mielke, MD Kim Thein, APRN [facility]      Clinic hours: Monday - Thursday 7 am-7 pm;  Fridays 7 am-5 pm.   Urgent care: Monday - Friday 11 am-9 pm; Saturday and Sunday 9 am-5 pm.  Pharmacy : Monday -Thursday 8 am-8 pm; Friday 8 am-6 pm; Saturday and Sunday 9 am-5 pm.     Clinic: [phone]   Pharmacy: [phone]              Follow-ups after your visit        Additional Services     ORTHO  REFERRAL       [facility] is referring you to the Orthopedic  Services at [facility].       The  Representative will assist you in the coordination of your Orthopedic and Musculoskeletal Care as prescribed by your physician.    The  Representative will call you within 1 business day to help schedule your appointment, or you may contact the  Representative at:    All areas ~ [phone]     Type of Referral : [facility]       Timeframe requested: Routine    Coverage of these services is subject to the terms and limitations of your health insurance plan.  Please call member services at your health plan with any benefit or coverage questions.      If X-rays, CT or MRI's have been performed, please contact the facility where they were done to arrange for , prior to your scheduled appointment.  Please bring this referral request to your appointment and present it to your specialist.                  Who to contact     If you have questions or need follow up information about today's clinic visit or your schedule please contact [facility] [place] directly at [phone].  Normal or non-critical lab and imaging results will be communicated to you by MyChart, letter or phone within 4 business days after the clinic has received the results. If you do not hear from us within 7 days, please contact the clinic through MyChart or phone. If you have a critical or abnormal lab result, we will notify you by phone as soon as possible.  Submit refill requests through [employer] or call your  "pharmacy and they will forward the refill request to us. Please allow 3 business days for your refill to be completed.          Additional Information About Your Visit        MyChart Information     [employer] lets you send messages to your doctor, view your test results, renew your prescriptions, schedule appointments and more. To sign up, go to [email or web address] . Click on \"Log in\" on the left side of the screen, which will take you to the Welcome page. Then click on \"Sign up Now\" on the right side of the page.     You will be asked to enter the access code listed below, as well as some personal information. Please follow the directions to create your username and password.     Your access code is: [email or web address]  Expires: 2018  9:23 AM     Your access code will  in 90 days. If you need help or a new code, please call your [place] clinic or [phone].        Care EveryWhere ID     This is your [facility] EveryWhere ID. This could be used by other organizations to access your [place] medical records  [id number]        Your Vitals Were     Pulse Temperature Height Pulse Oximetry BMI (Body Mass Index)       78 98.4  F (36.9  C) (Oral) 5' 6\" (1.676 m) 99% 23.57 kg/m2        Blood Pressure from Last 3 Encounters:   18 130/80   18 143/85   18 126/70    Weight from Last 3 Encounters:   18 146 lb (66.2 kg)   18 150 lb 3.2 oz (68.1 kg)   18 148 lb (67.1 kg)              We Performed the Following     ORTHO  REFERRAL          Today's Medication Changes          These changes are accurate as of 18  6:57 PM.  If you have any questions, ask your nurse or doctor.               Start taking these medicines.        Dose/Directions    lisinopril 10 MG tablet   Commonly known as:  PRINIVIL/ZESTRIL   Used for:  Prediabetes, HTN, goal below 140/90   Started by:  Angelina Sharma MD        Dose:  10 mg   Take 1 tablet (10 mg) by mouth daily   Quantity:  90 tablet   Refills:  " 1            Where to get your medicines      These medications were sent to [facility] [id number] [facility] - [place], [place] - [address]  [address] [address]     Phone:  [phone]     lisinopril 10 MG tablet    metFORMIN 500 MG 24 hr tablet                Primary Care Provider Fax #    Physician No Ref-Primary [phone]       No address on file        Equal Access to Services     [facility]: Hadii aad ku hadasho Soomaali, waaxda luqadaha, qaybta kaalmada adeegyada, waxay idiin hayaan adeeg kharash lashakila . So [facility] [phone].    ATENCIÓN: Si habla español, tiene a cruz disposición servicios gratuitos de asistencia lingüística. Edame al [phone].    We comply with applicable federal civil rights laws and [place] laws. We do not discriminate on the basis of race, color, national origin, age, disability, sex, sexual orientation, or gender identity.            Thank you!     Thank you for choosing [facility]  for your care. Our goal is always to provide you with excellent care. Hearing back from our patients is one way we can continue to improve our services. Please take a few minutes to complete the written survey that you may receive in the mail after your visit with us. Thank you!             Your Updated Medication List - Protect others around you: Learn how to safely use, store and throw away your medicines at www.disposemymeds.org.          This list is accurate as of 5/23/18  6:57 PM.  Always use your most recent med list.                   Brand Name Dispense Instructions for use Diagnosis    lisinopril 10 MG tablet    PRINIVIL/ZESTRIL    90 tablet    Take 1 tablet (10 mg) by mouth daily    Prediabetes, HTN, goal below 140/90       metFORMIN 500 MG 24 hr tablet    GLUCOPHAGE-XR    180 tablet    TAKE 2 TABLETS (1,000 MG) BY MOUTH DAILY.    Prediabetes

## 2018-05-23 NOTE — PROGRESS NOTES
SUBJECTIVE:   Dorian Husain is a 42 year old male who presents to clinic today for the following health issues:      Rash      Duration: x 15 days    Description  Location: face, lip  Itching: mild    Intensity:  moderate    Accompanying signs and symptoms: painful    History (similar episodes/previous evaluation): None    Precipitating or alleviating factors:  New exposures:  None  Recent travel: no      Therapies tried and outcome: none  Thought he also developed a cold sore, this has been improved since use of over the counter Abreva  Also concerned about black dots on his face for over a year.  No personal or family history of skin cancer    Hypertension Follow-up      Outpatient blood pressures are being checked at home.  Results are less than 140/90.    Low Salt Diet: low salt    Wants to change his HCTZ to Lisinopril     Pre Diabetes:  -Some exercise  -Tolerating 1000 mg of Metformin daily without side effects, needs refills      Callus on foot:  -has had a callus on the bottom of his right foot for 4 years  -has not improved  -Has had scraping and freezing with liquid nitrogen  -pain++    Problem list and histories reviewed & adjusted, as indicated.  Additional history: as documented    Patient Active Problem List   Diagnosis     Family history of ischemic heart disease     Mixed hyperlipidemia     Prediabetes     HTN, goal below 140/90     No past surgical history on file.    Social History   Substance Use Topics     Smoking status: Light Tobacco Smoker     Smokeless tobacco: Never Used     Alcohol use Not on file     Family History   Problem Relation Age of Onset     CEREBROVASCULAR DISEASE Father          Current Outpatient Prescriptions   Medication Sig Dispense Refill     lisinopril (PRINIVIL/ZESTRIL) 10 MG tablet Take 1 tablet (10 mg) by mouth daily 90 tablet 1     metFORMIN (GLUCOPHAGE-XR) 500 MG 24 hr tablet TAKE 2 TABLETS (1,000 MG) BY MOUTH DAILY. 180 tablet 1     [DISCONTINUED] metFORMIN  "(GLUCOPHAGE-XR) 500 MG 24 hr tablet TAKE 2 TABLETS (1,000 MG) BY MOUTH DAILY.  2     No Known Allergies  Recent Labs   Lab Test  03/02/18   0728   A1C  5.7   LDL  71   HDL  43   TRIG  278*   ALT  37   CR  0.88   GFRESTIMATED  >90   GFRESTBLACK  >90   POTASSIUM  4.0   TSH  1.76      BP Readings from Last 3 Encounters:   05/23/18 130/80   03/19/18 143/85   03/09/18 126/70    Wt Readings from Last 3 Encounters:   05/23/18 146 lb (66.2 kg)   03/09/18 150 lb 3.2 oz (68.1 kg)   02/28/18 148 lb (67.1 kg)                  Labs reviewed in EPIC    Reviewed and updated as needed this visit by clinical staff  Allergies       Reviewed and updated as needed this visit by Provider         ROS:  Constitutional, HEENT, cardiovascular, pulmonary, gi and gu systems are negative, except as otherwise noted.    OBJECTIVE:                                                    /80  Pulse 78  Temp 98.4  F (36.9  C) (Oral)  Ht 5' 6\" (1.676 m)  Wt 146 lb (66.2 kg)  SpO2 99%  BMI 23.57 kg/m2  Body mass index is 23.57 kg/(m^2).  GENERAL APPEARANCE: healthy, alert and no distress  EYES: Eyes grossly normal to inspection and conjunctivae and sclerae normal  NECK: trachea midline and normal to palpation  RESP: lungs clear to auscultation - no rales, rhonchi or wheezes  CV: regular rates and rhythm, normal S1 S2, no S3 or S4 and no murmur, click or rub  ABDOMEN: soft, non-tender  MS: extremities normal- no gross deformities noted and peripheral pulses normal  SKIN: slight area of post inflammatory hyperpigmentation in the left naso labial fold, Seborrheic keratosis on the face+  NEURO: Normal strength and tone, mentation intact and speech normal  PSYCH: mentation appears normal  Right Foot: Plantar aspect with callus+     Diagnostic test results:  Diagnostic Test Results:  No results found for this or any previous visit (from the past 24 hour(s)).     ASSESSMENT/PLAN:                                                    1. " Prediabetes  -last HbA1C was 5.7  - metFORMIN (GLUCOPHAGE-XR) 500 MG 24 hr tablet; TAKE 2 TABLETS (1,000 MG) BY MOUTH DAILY.  Dispense: 180 tablet; Refill: 1  - lisinopril (PRINIVIL/ZESTRIL) 10 MG tablet; Take 1 tablet (10 mg) by mouth daily  Dispense: 90 tablet; Refill: 1    2. Tobacco use disorder  -Has been cutting down himself  -less stress at work    3. HTN, goal below 140/90  -Change per patient request  -Monitor Blood pressure at home  - lisinopril (PRINIVIL/ZESTRIL) 10 MG tablet; Take 1 tablet (10 mg) by mouth daily  Dispense: 90 tablet; Refill: 1    4. Post-inflammatory hyperpigmentation  -Use of sunscreen discussed  -natural course is to fade itself over several months     5. Corns and callus  -No improvement over 4 years, time to see Podiatry   - ORTHO  REFERRAL    6. Seborrheic keratosis  -Benign nature discussed  -will need to see Dermatology if he wants them removed      Follow up with Provider -6 months      Angelina Sharma MD MPH    [facility]

## 2018-05-23 NOTE — PATIENT INSTRUCTIONS
At [facility], we strive to deliver an exceptional experience to you, every time we see you.  If you receive a survey in the mail, please send us back your thoughts. We really do value your feedback.    Based on your medical history, these are the current health maintenance/preventive care services that you are due for (some may have been done at this visit.)  Health Maintenance Due   Topic Date Due     TOBACCO CESSATION COUNSELING Q1 YR  1975     HIV SCREEN (SYSTEM ASSIGNED)  08/01/1993       Suggested websites for health information:  Www.[employer].org : Up to date and easily searchable information on multiple topics.  Www.medlineplus.gov : medication info, interactive tutorials, watch real surgeries online  Www.familydoctor.org : good info from the Academy of Family Physicians  Www.cdc.gov : public health info, travel advisories, epidemics (H1N1)  Www.aap.org : children's health info, normal development, vaccinations  Www.health.[facility].[place].us : [place] dept of health, public health issues in [place], N1N1    Your care team:                            Family Medicine Internal Medicine   MD Dani Phelan MD Shantel Branch-Fleming, MD Katya Georgiev PA-C Megan Hill, APRN CNP    Eduardo Vincent MD Pediatrics   Flavio Turpin, PAHEATHER Campbell, MD Wendy Jernigan APRN CNP   MD Liz Meraz MD Deborah Mielke, MD Kim Thein, APRN [facility]      Clinic hours: Monday - Thursday 7 am-7 pm; Fridays 7 am-5 pm.   Urgent care: Monday - Friday 11 am-9 pm; Saturday and Sunday 9 am-5 pm.  Pharmacy : Monday -Thursday 8 am-8 pm; Friday 8 am-6 pm; Saturday and Sunday 9 am-5 pm.     Clinic: [phone]   Pharmacy: [phone]

## 2018-06-27 ENCOUNTER — OFFICE VISIT (OUTPATIENT)
Dept: PODIATRY | Facility: CLINIC | Age: 43
End: 2018-06-27
Payer: COMMERCIAL

## 2018-06-27 VITALS
HEART RATE: 70 BPM | WEIGHT: 146 LBS | DIASTOLIC BLOOD PRESSURE: 76 MMHG | SYSTOLIC BLOOD PRESSURE: 130 MMHG | BODY MASS INDEX: 23.57 KG/M2

## 2018-06-27 DIAGNOSIS — L84 CORNS AND CALLOSITIES: Primary | ICD-10-CM

## 2018-06-27 PROCEDURE — 11055 PARING/CUTG B9 HYPRKER LES 1: CPT | Performed by: PODIATRIST

## 2018-06-27 PROCEDURE — 99243 OFF/OP CNSLTJ NEW/EST LOW 30: CPT | Mod: 25 | Performed by: PODIATRIST

## 2018-06-27 NOTE — PATIENT INSTRUCTIONS
SMOKING CESSATION  What's in cigarette smoke? - Cigarette smoke contains over 4,000 chemicals. Nicotine is one of the main ingredients which is an insecticide/herbicide. It is poisonous to our nervous system, increases blood clotting risk, and decreases the body's defenses to fight off infection. Another chemical is Carbon Monoxide is an asphyxiating gas that permanently binds to blood cells and blocks the transport of oxygen. This leads to tissue death and decreases your metabolism. Tar is a chemical that coats your lungs and trachea which impairs new oxygen coming in and carbon dioxide getting out of your body.   How does smoking impact surgery? - Smoking is particularly hazardous with regards to surgery. Surgery puts stress on the body and a smoker's body is already under strain from these chemicals. Putting the two together, especially for an elective surgery, could be a recipe for disaster. Smoking before and after surgery increases your risk of heart problems, slow wound healing, delayed bone healing, blood clots, wound infection and anesthesia complications.   What are the benefits of quitting? - In 20 minutes your blood pressure will drop back down to normal. In 8 hours the carbon monoxide (a toxic gas) levels in your blood stream will drop by half, and oxygen levels will return to normal. In 48 hours your chance of having a heart attack will have decreased. All nicotine will have left your body. Your sense of taste and smell will return to a normal level. In 72 hours your bronchial tubes will relax, and your energy levels will increase. In 2 weeks your circulation will increase, and it will continue to improve for the next 10 weeks.    Recommendations for elective surgery - Ideally, patients should quit smoking 8 weeks before and at least 2 weeks after elective surgery in order to avoid complications. Simply cutting back on the amount of cigarettes smoked per day does not offer any benefit or decrease the  risk of poor wound healing, heart problems, and infection. Smokers should also start taking Vitamin C and B for two weeks before surgery and two weeks after surgery.    Ways to Stop Smokin. Nicotine patches, lozenges, or gum  2. Support Groups  3. Medications (see below)    List of Medications:  1. Varenicline Tartrate (CHANTIX)   2. Bupropion HCL (WELLBUTRIN, ZYBAN) - note: make sure Wellbutrin is for smoking cessation and not other issues   3. Nicotine Patch (NICODERM)   4. Nicotine Inhaler (NICOTROL)   5. Nicotine Gum Nicotine Polacrilex   6. Nicotine Lozenge: Nicotine Polacrilex (COMMIT)   * [place] offers a smoking support group as well!  Please visit: [email or web address]  If you are interested in these, ask about getting a prescription or talk to your primary care doctor about what may be the best way for you to quit.

## 2018-06-27 NOTE — LETTER
6/27/2018         RE: Dorian Husain  [address] [place] [address]        Dear Colleague,    Thank you for referring your patient, Dorian Husain, to the [facility]. Please see a copy of my visit note below.    Subjective:    Pt is seen today in consult form Dr. Sharma  with the c/c of painful right foot callus.  This has been symptomatic for the past several years.   Patient points to right sub fifth met head.  Describes this as a burning pain.  Aggravated by activity and releaved by rest.  Has tried  changing shoewear around w/o much success.   No hx of wound healing problems, denies drainage, denies trauma, denies numbess or tingling.  Denies edema or erythema.  Denies any drainage from callus.  Smoker.  Sitting mostly at work.      ROS:  See above.  A 10-point review of systems was performed and is positive for that noted in the HPI and as seen below.  All other areas are negative.      No Known Allergies    Current Outpatient Prescriptions   Medication Sig Dispense Refill     lisinopril (PRINIVIL/ZESTRIL) 10 MG tablet Take 1 tablet (10 mg) by mouth daily 90 tablet 1     metFORMIN (GLUCOPHAGE-XR) 500 MG 24 hr tablet TAKE 2 TABLETS (1,000 MG) BY MOUTH DAILY. 180 tablet 1       Patient Active Problem List   Diagnosis     Family history of ischemic heart disease     Mixed hyperlipidemia     Prediabetes     HTN, goal below 140/90       Past Medical History:   Diagnosis Date     Hyperlipidemia      Pre-diabetes        No past surgical history on file.    Family History   Problem Relation Age of Onset     Cerebrovascular Disease Father        Social History   Substance Use Topics     Smoking status: Light Tobacco Smoker     Smokeless tobacco: Never Used     Alcohol use Not on file         Objective:  /76 (BP Location: Left arm, Patient Position: Sitting, Cuff Size: Adult Regular)  Pulse 70  Wt 146 lb (66.2 kg)  BMI 23.57 kg/m2.      Constitutional/ general:  Pt is in  no apparent distress, appears well-nourished.  Cooperative with history and physical exam.     Psych:  The patient answered questions appropriately.  Normal affect.  Seems to have reasonable expectations, in terms of treatment.     Eyes:  Visual scanning/ tracking without deficit.     Ears:  Response to auditory stimuli is normal.   hearing aid devices.  Auricles in proper alignment.     Lymphatic:  Popliteal lymph nodes not enlarged.     Lungs:  Non labored breathing, non labored speech. No cough.  No audible wheezing. Even, quiet breathing.       Vascular:  Pedal pulses are palpable bilaterally for both the DP and PT arteries.  CFT < 3 sec.  No edema.  Pedal hair growth noted.     Neuro:  Alert and oriented x 3. Coordinated gait.  Light touch sensation is intact to the L4, L5, S1 distributions. No obvious deficits.  No evidence of neurological-based weakness, spasticity, or contracture in the lower extremities.     Derm: Normal texture and turgor.  No erythema, ecchymosis, or cyanosis.  No open lesions.     Musculoskeletal:    Lower extremity muscle strength is normal.  Patient is ambulatory without an assistive device or brace  Normal arch with weightbearing.  No forefoot or rear foot deformities noted.  MS 5/5 all compartments.  Normal ROM all forefoot and rearfoot joints.  No equinus. Patient has a right foot callus sub fifth met head.  Healthy tissue is noted upon debridement.  No subcutaneous masses noted.  No sinus tracts, ulceration, or drainage.     A/P Callus    Callus/calluses debrided with a fifteen blade.   Patient will wear good shoes at all times and keep this down with a pumice stone.  Stiff shoes at home and work and I made recommendations.  RTC prn.  Thank you for allowing me participate in the care of this patient.         Natan Archer DPM, FACFAS    ;ll    Again, thank you for allowing me to participate in the care of your patient.        Sincerely,        Natan Archer DPM

## 2018-06-27 NOTE — MR AVS SNAPSHOT
After Visit Summary   6/27/2018    Dorian Husain    MRN: [id number]           Patient Information     Date Of Birth          1975        Visit Information        Provider Department      6/27/2018 1:30 PM Natan Archer DPM [facility]        Today's Diagnoses     Corns and callosities    -  1      Care Instructions    SMOKING CESSATION  What's in cigarette smoke? - Cigarette smoke contains over 4,000 chemicals. Nicotine is one of the main ingredients which is an insecticide/herbicide. It is poisonous to our nervous system, increases blood clotting risk, and decreases the body's defenses to fight off infection. Another chemical is Carbon Monoxide is an asphyxiating gas that permanently binds to blood cells and blocks the transport of oxygen. This leads to tissue death and decreases your metabolism. Tar is a chemical that coats your lungs and trachea which impairs new oxygen coming in and carbon dioxide getting out of your body.   How does smoking impact surgery? - Smoking is particularly hazardous with regards to surgery. Surgery puts stress on the body and a smoker's body is already under strain from these chemicals. Putting the two together, especially for an elective surgery, could be a recipe for disaster. Smoking before and after surgery increases your risk of heart problems, slow wound healing, delayed bone healing, blood clots, wound infection and anesthesia complications.   What are the benefits of quitting? - In 20 minutes your blood pressure will drop back down to normal. In 8 hours the carbon monoxide (a toxic gas) levels in your blood stream will drop by half, and oxygen levels will return to normal. In 48 hours your chance of having a heart attack will have decreased. All nicotine will have left your body. Your sense of taste and smell will return to a normal level. In 72 hours your bronchial tubes will relax, and your energy levels will increase. In 2 weeks  your circulation will increase, and it will continue to improve for the next 10 weeks.    Recommendations for elective surgery - Ideally, patients should quit smoking 8 weeks before and at least 2 weeks after elective surgery in order to avoid complications. Simply cutting back on the amount of cigarettes smoked per day does not offer any benefit or decrease the risk of poor wound healing, heart problems, and infection. Smokers should also start taking Vitamin C and B for two weeks before surgery and two weeks after surgery.    Ways to Stop Smokin. Nicotine patches, lozenges, or gum  2. Support Groups  3. Medications (see below)    List of Medications:  1. Varenicline Tartrate (CHANTIX)   2. Bupropion HCL (WELLBUTRIN, ZYBAN) - note: make sure Wellbutrin is for smoking cessation and not other issues   3. Nicotine Patch (NICODERM)   4. Nicotine Inhaler (NICOTROL)   5. Nicotine Gum Nicotine Polacrilex   6. Nicotine Lozenge: Nicotine Polacrilex (COMMIT)   * [place] offers a smoking support group as well!  Please visit: [email or web address]  If you are interested in these, ask about getting a prescription or talk to your primary care doctor about what may be the best way for you to quit.                 Follow-ups after your visit        Who to contact     If you have questions or need follow up information about today's clinic visit or your schedule please contact [facility] directly at [phone].  Normal or non-critical lab and imaging results will be communicated to you by MyChart, letter or phone within 4 business days after the clinic has received the results. If you do not hear from us within 7 days, please contact the clinic through MyChart or phone. If you have a critical or abnormal lab result, we will notify you by phone as soon as possible.  Submit refill requests through [facility] or call your pharmacy and they will forward the refill request to us. Please allow 3  "business days for your refill to be completed.          Additional Information About Your Visit        MyChart Information     [employer] lets you send messages to your doctor, view your test results, renew your prescriptions, schedule appointments and more. To sign up, go to [email or web address] . Click on \"Log in\" on the left side of the screen, which will take you to the Welcome page. Then click on \"Sign up Now\" on the right side of the page.     You will be asked to enter the access code listed below, as well as some personal information. Please follow the directions to create your username and password.     Your access code is: [email or web address]  Expires: 2018  2:03 PM     Your access code will  in 90 days. If you need help or a new code, please call your [place] clinic or [phone].        Care EveryWhere ID     This is your Care EveryWhere ID. This could be used by other organizations to access your [place] medical records  [id number]        Your Vitals Were     Pulse BMI (Body Mass Index)                70 23.57 kg/m2           Blood Pressure from Last 3 Encounters:   18 130/76   18 130/80   18 143/85    Weight from Last 3 Encounters:   18 146 lb (66.2 kg)   18 146 lb (66.2 kg)   18 150 lb 3.2 oz (68.1 kg)              We Performed the Following     TRIM HYPERKERATOTIC SKIN LESION, ONE        Primary Care Provider Fax #    Physician No Ref-Primary [phone]       No address on file        Equal Access to Services     [facility] : Hadii renée guerreroo Soadam, waaxda luqadaha, qaybta kaalmada connie, riccardo rosado . [facility] [phone].    ATENCIÓN: Si habla español, tiene a cruz disposición servicios gratuitos de asistencia lingüística. Llame al [phone].    We comply with applicable federal civil rights laws and [place] laws. We do not discriminate on the basis of race, color, national origin, age, disability, sex, " sexual orientation, or gender identity.            Thank you!     Thank you for choosing [facility]  for your care. Our goal is always to provide you with excellent care. Hearing back from our patients is one way we can continue to improve our services. Please take a few minutes to complete the written survey that you may receive in the mail after your visit with us. Thank you!             Your Updated Medication List - Protect others around you: Learn how to safely use, store and throw away your medicines at www.disposemymeds.org.          This list is accurate as of 6/27/18  2:08 PM.  Always use your most recent med list.                   Brand Name Dispense Instructions for use Diagnosis    lisinopril 10 MG tablet    PRINIVIL/ZESTRIL    90 tablet    Take 1 tablet (10 mg) by mouth daily    Prediabetes, HTN, goal below 140/90       metFORMIN 500 MG 24 hr tablet    GLUCOPHAGE-XR    180 tablet    TAKE 2 TABLETS (1,000 MG) BY MOUTH DAILY.    Prediabetes

## 2018-06-27 NOTE — PROGRESS NOTES
Subjective:    Pt is seen today in consult form Dr. Sharma  with the c/c of painful right foot callus.  This has been symptomatic for the past several years.   Patient points to right sub fifth met head.  Describes this as a burning pain.  Aggravated by activity and releaved by rest.  Has tried  changing shoewear around w/o much success.   No hx of wound healing problems, denies drainage, denies trauma, denies numbess or tingling.  Denies edema or erythema.  Denies any drainage from callus.  Smoker.  Sitting mostly at work.      ROS:  See above.  A 10-point review of systems was performed and is positive for that noted in the HPI and as seen below.  All other areas are negative.      No Known Allergies    Current Outpatient Prescriptions   Medication Sig Dispense Refill     lisinopril (PRINIVIL/ZESTRIL) 10 MG tablet Take 1 tablet (10 mg) by mouth daily 90 tablet 1     metFORMIN (GLUCOPHAGE-XR) 500 MG 24 hr tablet TAKE 2 TABLETS (1,000 MG) BY MOUTH DAILY. 180 tablet 1       Patient Active Problem List   Diagnosis     Family history of ischemic heart disease     Mixed hyperlipidemia     Prediabetes     HTN, goal below 140/90       Past Medical History:   Diagnosis Date     Hyperlipidemia      Pre-diabetes        No past surgical history on file.    Family History   Problem Relation Age of Onset     Cerebrovascular Disease Father        Social History   Substance Use Topics     Smoking status: Light Tobacco Smoker     Smokeless tobacco: Never Used     Alcohol use Not on file         Objective:  /76 (BP Location: Left arm, Patient Position: Sitting, Cuff Size: Adult Regular)  Pulse 70  Wt 146 lb (66.2 kg)  BMI 23.57 kg/m2.      Constitutional/ general:  Pt is in no apparent distress, appears well-nourished.  Cooperative with history and physical exam.     Psych:  The patient answered questions appropriately.  Normal affect.  Seems to have reasonable expectations, in terms of treatment.     Eyes:  Visual  scanning/ tracking without deficit.     Ears:  Response to auditory stimuli is normal.   hearing aid devices.  Auricles in proper alignment.     Lymphatic:  Popliteal lymph nodes not enlarged.     Lungs:  Non labored breathing, non labored speech. No cough.  No audible wheezing. Even, quiet breathing.       Vascular:  Pedal pulses are palpable bilaterally for both the DP and PT arteries.  CFT < 3 sec.  No edema.  Pedal hair growth noted.     Neuro:  Alert and oriented x 3. Coordinated gait.  Light touch sensation is intact to the L4, L5, S1 distributions. No obvious deficits.  No evidence of neurological-based weakness, spasticity, or contracture in the lower extremities.     Derm: Normal texture and turgor.  No erythema, ecchymosis, or cyanosis.  No open lesions.     Musculoskeletal:    Lower extremity muscle strength is normal.  Patient is ambulatory without an assistive device or brace  Normal arch with weightbearing.  No forefoot or rear foot deformities noted.  MS 5/5 all compartments.  Normal ROM all forefoot and rearfoot joints.  No equinus. Patient has a right foot callus sub fifth met head.  Healthy tissue is noted upon debridement.  No subcutaneous masses noted.  No sinus tracts, ulceration, or drainage.     A/P Callus    Callus/calluses debrided with a fifteen blade.   Patient will wear good shoes at all times and keep this down with a pumice stone.  Stiff shoes at home and work and I made recommendations.  RTC prn.  Thank you for allowing me participate in the care of this patient.         Natan Archer DPM, FACFAS    ;ll

## 2018-07-05 ENCOUNTER — TELEPHONE (OUTPATIENT)
Dept: FAMILY MEDICINE | Facility: CLINIC | Age: 43
End: 2018-07-05

## 2018-07-05 DIAGNOSIS — I10 HTN, GOAL BELOW 140/90: ICD-10-CM

## 2018-07-05 NOTE — TELEPHONE ENCOUNTER
Reason for Call:  Other prescription    Detailed comments: Bertin is calling about his lisinopril (PRINIVIL/ZESTRIL) 10 MG tablet.  Says you switched him to this and he is experiencing all the side effects you have warned him about and would like for you to prescribe his previous medication as soon as possible. Please send to [facility] in [facility] as soon as possible.     Phone Number Patient can be reached at: Home number on file [phone] (home)    Best Time: Any    Can we leave a detailed message on this number? YES    Call taken on 7/5/2018 at 6:21 PM by Abdiel De León

## 2018-07-06 RX ORDER — HYDROCHLOROTHIAZIDE 25 MG/1
25 TABLET ORAL DAILY
Qty: 90 TABLET | Refills: 1 | Status: SHIPPED | OUTPATIENT
Start: 2018-07-06 | End: 2018-12-23

## 2018-07-06 RX ORDER — HYDROCHLOROTHIAZIDE 25 MG/1
TABLET ORAL
Qty: 90 TABLET | Refills: 0 | OUTPATIENT
Start: 2018-07-06

## 2018-07-06 NOTE — TELEPHONE ENCOUNTER
[facility]      Reason for call triage and left message.      If patient calls back:   Patient contacted by a Registered Nurse. Inform patient that someone from the RN group will contact them, document that pt called and route to P DYAD 3 RN POOL [[phone]]        Daphnie Abernathy, RN

## 2018-07-06 NOTE — TELEPHONE ENCOUNTER
.Patient  returned call    Best number to reach caller: Cell number on file:    Telephone Information:   Mobile [phone]       Is it ok to leave a detailed message: YES

## 2018-07-06 NOTE — TELEPHONE ENCOUNTER
"Requested Prescriptions   Pending Prescriptions Disp Refills     hydrochlorothiazide (HYDRODIURIL) 25 MG tablet [Pharmacy Med Name: HYDROCHLOROTHIAZIDE 25 MG TAB]  Last Written Prescription Date:  3/9/18  Last Fill Quantity: 90 tablet,  # refills: 1   Last office visit: 5/23/2018 with prescribing provider:  Dr Sharma  Future Office Visit:    Routing refill request to provider for review/approval because:  Drug not active on patient's medication list   90 tablet 0     Sig: TAKE 1 TABLET BY MOUTH EVERY DAY    Diuretics (Including Combos) Protocol Passed    7/5/2018  6:20 PM       Passed - Blood pressure under 140/90 in past 12 months    BP Readings from Last 3 Encounters:   06/27/18 130/76   05/23/18 130/80   03/19/18 143/85                Passed - Recent (12 mo) or future (30 days) visit within the authorizing provider's specialty    Patient had office visit in the last 12 months or has a visit in the next 30 days with authorizing provider or within the authorizing provider's specialty.  See \"Patient Info\" tab in inbasket, or \"Choose Columns\" in Meds & Orders section of the refill encounter.           Passed - Patient is age 18 or older       Passed - Normal serum creatinine on file in past 12 months    Recent Labs   Lab Test  03/02/18   0728   CR  0.88             Passed - Normal serum potassium on file in past 12 months    Recent Labs   Lab Test  03/02/18   0728   POTASSIUM  4.0                   Passed - Normal serum sodium on file in past 12 months    Recent Labs   Lab Test  03/02/18   0728   NA  137                "

## 2018-07-06 NOTE — TELEPHONE ENCOUNTER
Stopped Lisinopril and sent in script for Hydrochlorothiazide.   Thanks,  Angleina Sharma MD MPH

## 2018-07-06 NOTE — TELEPHONE ENCOUNTER
Spoke with patient and he said that he has had a cough and throat pain over the last three weeks since starting the lisinopril. He said he never had these problems when taking the HCTZ and he would like to go back on that. Orders pended; please sign if appropriate.     Michael Scott RN, BSN

## 2018-07-06 NOTE — TELEPHONE ENCOUNTER
Routing refill request to provider for review/approval because:  Drug not active on patient's medication list     Disp Refills Start End EASTON   hydrochlorothiazide (HYDRODIURIL) 25 MG tablet (Discontinued) 90 tablet 1 3/9/2018 5/23/2018 --   Sig - Route: Take 1 tablet (25 mg) by mouth daily - Oral   Class: E-Prescribe   Reason for Discontinue: Therapy completed   Order: [id number]   E-Prescribing Status: Receipt confirmed by pharmacy (3/9/2018  8:13 AM CST)     Jolynn Cueva RN

## 2018-07-06 NOTE — TELEPHONE ENCOUNTER
[facility]      Reason for call refill request (please see note below) and left message.      If patient calls back:   Patient contacted by 2nd floor [facility] Care Team (MA/CHARLIE). Inform patient that someone from the team will contact them, document that pt called and route to care team.       JANESSA Rios      I called the pharmacy and they stated that pt doesn't have automatic refill set up so the refill wasn't sent by them.   I asked if pt's insurance allowed him to refill 90 tabs of the lisinopril. Pharmacist stated pt only can get 30 tablets at a time per insurance.   When pt calls back please clarify if he ment a refill of hydrochlorothiazide or lisinopril. If pt only needs a refill of lisinopril tell him he has refills at his [facility] pharmacy. JANESSA Rios

## 2018-07-09 ENCOUNTER — OFFICE VISIT (OUTPATIENT)
Dept: URGENT CARE | Facility: URGENT CARE | Age: 43
End: 2018-07-09
Payer: COMMERCIAL

## 2018-07-09 VITALS
BODY MASS INDEX: 23.37 KG/M2 | HEART RATE: 82 BPM | DIASTOLIC BLOOD PRESSURE: 86 MMHG | WEIGHT: 144.8 LBS | OXYGEN SATURATION: 99 % | SYSTOLIC BLOOD PRESSURE: 136 MMHG | TEMPERATURE: 98 F

## 2018-07-09 DIAGNOSIS — J06.9 VIRAL UPPER RESPIRATORY TRACT INFECTION WITH COUGH: ICD-10-CM

## 2018-07-09 DIAGNOSIS — J34.89 SINUS PRESSURE: ICD-10-CM

## 2018-07-09 DIAGNOSIS — J02.0 STREPTOCOCCAL SORE THROAT: ICD-10-CM

## 2018-07-09 DIAGNOSIS — R50.9 FEVER, UNSPECIFIED FEVER CAUSE: Primary | ICD-10-CM

## 2018-07-09 LAB
DEPRECATED S PYO AG THROAT QL EIA: NORMAL
SPECIMEN SOURCE: NORMAL

## 2018-07-09 PROCEDURE — 87081 CULTURE SCREEN ONLY: CPT | Performed by: PHYSICIAN ASSISTANT

## 2018-07-09 PROCEDURE — 99213 OFFICE O/P EST LOW 20 MIN: CPT | Performed by: PHYSICIAN ASSISTANT

## 2018-07-09 PROCEDURE — 87880 STREP A ASSAY W/OPTIC: CPT | Performed by: PHYSICIAN ASSISTANT

## 2018-07-09 ASSESSMENT — ENCOUNTER SYMPTOMS
SHORTNESS OF BREATH: 0
ENDOCRINE NEGATIVE: 1
DIZZINESS: 0
VOMITING: 0
CARDIOVASCULAR NEGATIVE: 1
DIAPHORESIS: 0
FREQUENCY: 0
SORE THROAT: 0
EYE ITCHING: 0
POLYDIPSIA: 0
NEUROLOGICAL NEGATIVE: 1
EYE DISCHARGE: 0
EYES NEGATIVE: 1
GASTROINTESTINAL NEGATIVE: 1
ABDOMINAL PAIN: 0
COUGH: 1
RHINORRHEA: 0
WHEEZING: 0
FEVER: 0
WEAKNESS: 0
PALPITATIONS: 0
CONSTITUTIONAL NEGATIVE: 1
LIGHT-HEADEDNESS: 0
DIARRHEA: 0
CHILLS: 0
CHEST TIGHTNESS: 0
DYSURIA: 0
NAUSEA: 0
HEMATURIA: 0
ADENOPATHY: 0
EYE REDNESS: 0
ARTHRALGIAS: 1
MYALGIAS: 0
HEADACHES: 0

## 2018-07-09 NOTE — PATIENT INSTRUCTIONS
Please start using nasal saline rinses 3 times per day.  (Alexis Med or Nguyen Pot)  These can be purchased at any pharmacy.    Steam. Or vaporizer.  Afrin nasal decongestant for plugged nose (Do not use for more than 4 days)  Flonase for any nasal discharge.  Follow up with your regular doctor in 1 week if symptoms have not resolved.

## 2018-07-09 NOTE — PROGRESS NOTES
Chief Complaint:    Chief Complaint   Patient presents with     History of Present Illness     Patient complains of difficulty breathing, cough, no appetite, right side face pain, headache, left leg and ankle pain       HPI: Dorian Husain is an 42 year old male who presents for evaluation and treatment of cough.  He also has had R sided sinus pressure and pain, and a sore throat.  He states that this has been going on for roughly 3 weeks now.  He has tried sudafed with little relief.        ROS:      Review of Systems   Constitutional: Negative.  Negative for chills, diaphoresis and fever.   HENT: Negative.  Negative for congestion, ear pain, rhinorrhea and sore throat.    Eyes: Negative.  Negative for discharge, redness and itching.   Respiratory: Positive for cough. Negative for chest tightness, shortness of breath and wheezing.    Cardiovascular: Negative.  Negative for chest pain and palpitations.   Gastrointestinal: Negative.  Negative for abdominal pain, diarrhea, nausea and vomiting.   Endocrine: Negative.  Negative for polydipsia and polyuria.   Genitourinary: Negative for dysuria, frequency, hematuria and urgency.   Musculoskeletal: Positive for arthralgias. Negative for myalgias.   Skin: Negative for rash.   Allergic/Immunologic: Negative for immunocompromised state.   Neurological: Negative.  Negative for dizziness, weakness, light-headedness and headaches.   Hematological: Negative for adenopathy.        Family History   Family History   Problem Relation Age of Onset     Cerebrovascular Disease Father        Social History  Social History     Social History     Marital status:      Spouse name: N/A     Number of children: N/A     Years of education: N/A     Occupational History     Not on file.     Social History Main Topics     Smoking status: Light Tobacco Smoker     Smokeless tobacco: Never Used     Alcohol use Not on file     Drug use: Not on file     Sexual activity: Not on file     Other  Topics Concern     Not on file     Social History Narrative        Surgical History:  No past surgical history on file.     Problem List:  Patient Active Problem List   Diagnosis     Family history of ischemic heart disease     Mixed hyperlipidemia     Prediabetes     HTN, goal below 140/90        Allergies:  No Known Allergies     Current Meds:    Current Outpatient Prescriptions:      hydrochlorothiazide (HYDRODIURIL) 25 MG tablet, Take 1 tablet (25 mg) by mouth daily, Disp: 90 tablet, Rfl: 1     metFORMIN (GLUCOPHAGE-XR) 500 MG 24 hr tablet, TAKE 2 TABLETS (1,000 MG) BY MOUTH DAILY., Disp: 180 tablet, Rfl: 1     PHYSICAL EXAM:     Vital signs noted and reviewed by Quang Garsia  /86 (BP Location: Left arm, Patient Position: Chair, Cuff Size: Adult Regular)  Pulse 82  Temp 98  F (36.7  C) (Oral)  Wt 144 lb 12.8 oz (65.7 kg)  SpO2 99%  BMI 23.37 kg/m2     PEFR:  General appearance: healthy, alert and no distress  Ears: R TM - normal: no effusions, no erythema, and normal landmarks, L TM - normal: no effusions, no erythema, and normal landmarks  Eyes: R normal, EOM's intact and PERRLA, L normal, EOM's intact and PERRLA  Nose: boggy  Oropharynx: mild erythema  Neck: supple and no adenopathy  Lungs: normal and clear to auscultation  Heart: S1, S2 normal, no murmur, click, rub or gallop, regular rate and rhythm     Labs:    Results for orders placed or performed in visit on 07/09/18   Strep, Rapid Screen   Result Value Ref Range    Specimen Description Throat     Rapid Strep A Screen       NEGATIVE: No Group A streptococcal antigen detected by immunoassay, await culture report.          Medical Decision Making:    Differential Diagnosis:  URI Adult/Peds:  Allergic rhinitis, Bronchitis-viral, Sinusitis, Strep pharyngitis, Viral pharyngitis and Viral upper respiratory illness      ASSESSMENT:     1. Fever    2. Streptococcal sore throat    3. Sinus pressure    4. Viral upper respiratory tract infection with  cough           PLAN:     Patient presents for 3 weeks of sinus congestion, sore throat and cough.  He is afebrile in clinic today.  There is no facial swelling.  No sinus pain with percussion.    RST was negative and communicated to patient.  We will call with culture results if positive.  Worrisome symptoms discussed with instructions to go to the ED.  Patient verbalized understanding and agreed with this plan.     Quang Garsia  7/9/2018, 5:17 PM

## 2018-07-09 NOTE — MR AVS SNAPSHOT
"              After Visit Summary   7/9/2018    Dorian Husain    MRN: [id number]           Patient Information     Date Of Birth          1975        Visit Information        Provider Department      7/9/2018 4:55 PM Quang Garsia PA-C [facility] [address]        Today's Diagnoses     Fever    -  1    Streptococcal sore throat        Sinus pressure        Viral upper respiratory tract infection with cough          Care Instructions    Please start using nasal saline rinses 3 times per day.  (Alexis Med or Nguyen Pot)  These can be purchased at any pharmacy.    Steam. Or vaporizer.  Afrin nasal decongestant for plugged nose (Do not use for more than 4 days)  Flonase for any nasal discharge.  Follow up with your regular doctor in 1 week if symptoms have not resolved.          Follow-ups after your visit        Who to contact     If you have questions or need follow up information about today's clinic visit or your schedule please contact [facility] directly at [phone].  Normal or non-critical lab and imaging results will be communicated to you by Brainlyhart, letter or phone within 4 business days after the clinic has received the results. If you do not hear from us within 7 days, please contact the clinic through [facility] or phone. If you have a critical or abnormal lab result, we will notify you by phone as soon as possible.  Submit refill requests through [employer] or call your pharmacy and they will forward the refill request to us. Please allow 3 business days for your refill to be completed.          Additional Information About Your Visit        Brainlyhart Information     [employer] lets you send messages to your doctor, view your test results, renew your prescriptions, schedule appointments and more. To sign up, go to [email or web address] . Click on \"Log in\" on the left side of the screen, which will take you to the Welcome page. Then click on \"Sign up Now\" on the right " side of the page.     You will be asked to enter the access code listed below, as well as some personal information. Please follow the directions to create your username and password.     Your access code is: [email or web address]  Expires: 2018  2:03 PM     Your access code will  in 90 days. If you need help or a new code, please call your [employer] clinic or [phone].        Care EveryWhere ID     This is your Care EveryWhere ID. This could be used by other organizations to access your [employer] medical records  [id number]        Your Vitals Were     Pulse Temperature Pulse Oximetry BMI (Body Mass Index)          82 98  F (36.7  C) (Oral) 99% 23.37 kg/m2         Blood Pressure from Last 3 Encounters:   18 136/86   18 130/76   18 130/80    Weight from Last 3 Encounters:   18 144 lb 12.8 oz (65.7 kg)   18 146 lb (66.2 kg)   18 146 lb (66.2 kg)              We Performed the Following     Strep, Rapid Screen        Primary Care Provider Fax #    Physician No Ref-Primary [phone]       No address on file        Equal Access to Services     KAREN LOGAN : Lupe Wheeler, waaxda lugaby, qaybta kaalmada adealec, riccardo crowder. So [facility] [phone].    ATENCIÓN: Si habla español, tiene a cruz disposición servicios gratuitos de asistencia lingüística. Llame al [phone].    We comply with applicable federal civil rights laws and [place] laws. We do not discriminate on the basis of race, color, national origin, age, disability, sex, sexual orientation, or gender identity.            Thank you!     Thank you for choosing [facility]  for your care. Our goal is always to provide you with excellent care. Hearing back from our patients is one way we can continue to improve our services. Please take a few minutes to complete the written survey that you may receive in the mail after your visit with us. Thank you!              Your Updated Medication List - Protect others around you: Learn how to safely use, store and throw away your medicines at www.disposemymeds.org.          This list is accurate as of 7/9/18  6:09 PM.  Always use your most recent med list.                   Brand Name Dispense Instructions for use Diagnosis    hydrochlorothiazide 25 MG tablet    HYDRODIURIL    90 tablet    Take 1 tablet (25 mg) by mouth daily    HTN, goal below 140/90       metFORMIN 500 MG 24 hr tablet    GLUCOPHAGE-XR    180 tablet    TAKE 2 TABLETS (1,000 MG) BY MOUTH DAILY.    Prediabetes

## 2018-07-10 LAB
BACTERIA SPEC CULT: NORMAL
SPECIMEN SOURCE: NORMAL

## 2018-07-11 ENCOUNTER — RADIANT APPOINTMENT (OUTPATIENT)
Dept: GENERAL RADIOLOGY | Facility: CLINIC | Age: 43
End: 2018-07-11
Payer: COMMERCIAL

## 2018-07-11 ENCOUNTER — OFFICE VISIT (OUTPATIENT)
Dept: FAMILY MEDICINE | Facility: CLINIC | Age: 43
End: 2018-07-11
Payer: COMMERCIAL

## 2018-07-11 VITALS
BODY MASS INDEX: 23.46 KG/M2 | OXYGEN SATURATION: 99 % | TEMPERATURE: 98.8 F | DIASTOLIC BLOOD PRESSURE: 82 MMHG | WEIGHT: 146 LBS | SYSTOLIC BLOOD PRESSURE: 134 MMHG | HEART RATE: 80 BPM | HEIGHT: 66 IN

## 2018-07-11 DIAGNOSIS — R73.03 PREDIABETES: ICD-10-CM

## 2018-07-11 DIAGNOSIS — I10 HTN, GOAL BELOW 140/90: ICD-10-CM

## 2018-07-11 DIAGNOSIS — M77.52 LEFT ANKLE TENDONITIS: ICD-10-CM

## 2018-07-11 DIAGNOSIS — J01.90 ACUTE SINUSITIS WITH SYMPTOMS > 10 DAYS: Primary | ICD-10-CM

## 2018-07-11 PROCEDURE — 99214 OFFICE O/P EST MOD 30 MIN: CPT | Performed by: PREVENTIVE MEDICINE

## 2018-07-11 PROCEDURE — 73610 X-RAY EXAM OF ANKLE: CPT | Mod: LT

## 2018-07-11 RX ORDER — AMOXICILLIN AND CLAVULANATE POTASSIUM 500; 125 MG/1; MG/1
1 TABLET, FILM COATED ORAL 3 TIMES DAILY
Qty: 30 TABLET | Refills: 0 | Status: SHIPPED | OUTPATIENT
Start: 2018-07-11 | End: 2019-03-29

## 2018-07-11 ASSESSMENT — PAIN SCALES - GENERAL: PAINLEVEL: SEVERE PAIN (6)

## 2018-07-11 NOTE — LETTER
July 13, 2018      Dorian Husain  [address] [facility] [address]    Dear Dorian Husain,     X rays of the ankle did not show any bony abnormalities. Plan of care and follow up as discussed in clinic.   Please let me know if you have any questions and thank you for choosing [place].     Regards,     Angelina Sharma MD MPH/smj    Resulted Orders   XR Ankle Left G/E 3 Views    Narrative    ANKLE LEFT THREE OR MORE VIEWS  7/11/2018 5:05 PM      HISTORY: Left ankle tendonitis.    COMPARISON: None.      Impression    IMPRESSION: No acute fracture or dislocation.    NIMCO ROJAS MD

## 2018-07-11 NOTE — MR AVS SNAPSHOT
After Visit Summary   7/11/2018    Dorian Husain    MRN: [id number]           Patient Information     Date Of Birth          1975        Visit Information        Provider Department      7/11/2018 4:20 PM Angelina Sharma MD [facility]        Today's Diagnoses     HTN, goal below 140/90    -  1    Tobacco use disorder        Prediabetes        Left ankle tendonitis        Acute sinusitis with symptoms > 10 days          Care Instructions    At [facility], we strive to deliver an exceptional experience to you, every time we see you.  If you receive a survey in the mail, please send us back your thoughts. We really do value your feedback.    Based on your medical history, these are the current health maintenance/preventive care services that you are due for (some may have been done at this visit.)  Health Maintenance Due   Topic Date Due     TOBACCO CESSATION COUNSELING Q1 YR  1975     HIV SCREEN (SYSTEM ASSIGNED)  08/01/1993       Suggested websites for health information:  Www.Am[employer] : Up to date and easily searchable information on multiple topics.  Www.medlineplus.gov : medication info, interactive tutorials, watch real surgeries online  Www.familydoctor.org : good info from the Academy of Family Physicians  Www.cdc.gov : public health info, travel advisories, epidemics (H1N1)  Www.aap.org : children's health info, normal development, vaccinations  Www.health.state.[place].us : [place] dept of health, public health issues in [place], N1N1    Your care team:                            Family Medicine Internal Medicine   MD Dani Phelan MD Shantel Branch-Fleming, MD Katya Georgiev PA-C Megan Hill, JOHANNY Vincent MD Pediatrics   LUANA Mendez, MD Wendy Jernigan APRN CNP   MD Liz Meraz MD Deborah Mielke, MD Kim Thein, APRN [facility]      Clinic hours: Monday - Thursday 7  "am-7 pm;  7 am-5 pm.   Urgent care: Monday - Friday 11 am-9 pm; Saturday and  9 am-5 pm.  Pharmacy : Monday -Thursday 8 am-8 pm; Friday 8 am-6 pm; Saturday and  9 am-5 pm.     Clinic: [phone]   Pharmacy: [phone]              Follow-ups after your visit        Future tests that were ordered for you today     Open Future Orders        Priority Expected Expires Ordered    XR Ankle Left G/E 3 Views Routine 2018            Who to contact     If you have questions or need follow up information about today's clinic visit or your schedule please contact [facility] [place] directly at [phone].  Normal or non-critical lab and imaging results will be communicated to you by MyChart, letter or phone within 4 business days after the clinic has received the results. If you do not hear from us within 7 days, please contact the clinic through MyChart or phone. If you have a critical or abnormal lab result, we will notify you by phone as soon as possible.  Submit refill requests through [employer] or call your pharmacy and they will forward the refill request to us. Please allow 3 business days for your refill to be completed.          Additional Information About Your Visit        Hookedhart Information     Muse lets you send messages to your doctor, view your test results, renew your prescriptions, schedule appointments and more. To sign up, go to [email or web address] . Click on \"Log in\" on the left side of the screen, which will take you to the Welcome page. Then click on \"Sign up Now\" on the right side of the page.     You will be asked to enter the access code listed below, as well as some personal information. Please follow the directions to create your username and password.     Your access code is: [email or web address]  Expires: 2018  2:03 PM     Your access code will  in 90 days. If you need help or a new code, please call your [facility] or " "[phone].        Care EveryWhere ID     This is your Care EveryWhere ID. This could be used by other organizations to access your [place] medical records  [id number]        Your Vitals Were     Pulse Temperature Height Pulse Oximetry BMI (Body Mass Index)       80 98.8  F (37.1  C) (Oral) 5' 6\" (1.676 m) 99% 23.57 kg/m2        Blood Pressure from Last 3 Encounters:   07/11/18 134/82   07/09/18 136/86   06/27/18 130/76    Weight from Last 3 Encounters:   07/11/18 146 lb (66.2 kg)   07/09/18 144 lb 12.8 oz (65.7 kg)   06/27/18 146 lb (66.2 kg)                 Today's Medication Changes          These changes are accurate as of 7/11/18  4:55 PM.  If you have any questions, ask your nurse or doctor.               Start taking these medicines.        Dose/Directions    amoxicillin-clavulanate 500-125 MG per tablet   Commonly known as:  AUGMENTIN   Used for:  Acute sinusitis with symptoms > 10 days   Started by:  Angelina Sharma MD        Dose:  1 tablet   Take 1 tablet by mouth 3 times daily   Quantity:  30 tablet   Refills:  0       diclofenac 50 MG EC tablet   Commonly known as:  VOLTAREN   Used for:  Left ankle tendonitis   Started by:  Angelina Sharma MD        Dose:  50 mg   Take 1 tablet (50 mg) by mouth 3 times daily as needed for moderate pain   Quantity:  20 tablet   Refills:  1            Where to get your medicines      These medications were sent to [place] [address] [place] [facility] [place], [place] - [address]  [address] [place] [address]     Phone:  [phone]     amoxicillin-clavulanate 500-125 MG per tablet    diclofenac 50 MG EC tablet                Primary Care Provider Fax #    Physician No Ref-Primary [phone]       No address on file        Equal Access to Services     KAREN LOGAN : Lupe Wheeler, wablairda luqadaha, qaybta kaalmada connie, riccardo crowder. So [facility] [phone].    ATENCIÓN: Si habla español, tiene a cruz disposición servicios " elizabeth de asistencia lingüística. Ant blanca [phone].    We comply with applicable federal civil rights laws and [place] laws. We do not discriminate on the basis of race, color, national origin, age, disability, sex, sexual orientation, or gender identity.            Thank you!     Thank you for choosing [facility]  for your care. Our goal is always to provide you with excellent care. Hearing back from our patients is one way we can continue to improve our services. Please take a few minutes to complete the written survey that you may receive in the mail after your visit with us. Thank you!             Your Updated Medication List - Protect others around you: Learn how to safely use, store and throw away your medicines at www.disposemymeds.org.          This list is accurate as of 7/11/18  4:55 PM.  Always use your most recent med list.                   Brand Name Dispense Instructions for use Diagnosis    amoxicillin-clavulanate 500-125 MG per tablet    AUGMENTIN    30 tablet    Take 1 tablet by mouth 3 times daily    Acute sinusitis with symptoms > 10 days       diclofenac 50 MG EC tablet    VOLTAREN    20 tablet    Take 1 tablet (50 mg) by mouth 3 times daily as needed for moderate pain    Left ankle tendonitis       hydrochlorothiazide 25 MG tablet    HYDRODIURIL    90 tablet    Take 1 tablet (25 mg) by mouth daily    HTN, goal below 140/90       metFORMIN 500 MG 24 hr tablet    GLUCOPHAGE-XR    180 tablet    TAKE 2 TABLETS (1,000 MG) BY MOUTH DAILY.    Prediabetes

## 2018-07-11 NOTE — PROGRESS NOTES
SUBJECTIVE:   Dorian Husain is a 42 year old male who presents to clinic today for the following health issues:      RESPIRATORY SYMPTOMS      Duration: x 3-4 weeks    Description  nasal congestion, rhinorrhea, sore throat, facial pain/pressure, cough and headache    Severity: moderate    Accompanying signs and symptoms: None    History (predisposing factors):  none    Precipitating or alleviating factors: None    Therapies tried and outcome:  acetaminophen OTC cold    Seen in Urgent Care 7/9/18, strep test was negative.    Musculoskeletal problem/pain      Duration: x 2-3 weeks    Description  Location: left ankle    Intensity:  6/10    Accompanying signs and symptoms: swelling    History  Previous similar problem: no   Previous evaluation:  none    Precipitating or alleviating factors:  Trauma or overuse: no   Aggravating factors include: walking and overuse, went cycling about 4 weeks ago, did the      Therapies tried and outcome: NSAID - Advil    No rash    No specific trauma such as twisting injuries, no falls       Hypertension Follow-up      Outpatient blood pressures are being checked at home.  Results are less than 140/90.    Low Salt Diet: low salt      Problem list and histories reviewed & adjusted, as indicated.  Additional history: as documented    Patient Active Problem List   Diagnosis     Family history of ischemic heart disease     Mixed hyperlipidemia     Prediabetes     HTN, goal below 140/90     No past surgical history on file.    Social History   Substance Use Topics     Smoking status: Light Tobacco Smoker     Smokeless tobacco: Never Used     Alcohol use Not on file     Family History   Problem Relation Age of Onset     Cerebrovascular Disease Father          Current Outpatient Prescriptions   Medication Sig Dispense Refill     amoxicillin-clavulanate (AUGMENTIN) 500-125 MG per tablet Take 1 tablet by mouth 3 times daily 30 tablet 0     diclofenac (VOLTAREN) 50 MG EC tablet Take 1  "tablet (50 mg) by mouth 3 times daily as needed for moderate pain 20 tablet 1     hydrochlorothiazide (HYDRODIURIL) 25 MG tablet Take 1 tablet (25 mg) by mouth daily 90 tablet 1     metFORMIN (GLUCOPHAGE-XR) 500 MG 24 hr tablet TAKE 2 TABLETS (1,000 MG) BY MOUTH DAILY. 180 tablet 1     No Known Allergies  Recent Labs   Lab Test  03/02/18   0728   A1C  5.7   LDL  71   HDL  43   TRIG  278*   ALT  37   CR  0.88   GFRESTIMATED  >90   GFRESTBLACK  >90   POTASSIUM  4.0   TSH  1.76      BP Readings from Last 3 Encounters:   07/11/18 134/82   07/09/18 136/86   06/27/18 130/76    Wt Readings from Last 3 Encounters:   07/11/18 146 lb (66.2 kg)   07/09/18 144 lb 12.8 oz (65.7 kg)   06/27/18 146 lb (66.2 kg)                  Labs reviewed in EPIC    Reviewed and updated as needed this visit by clinical staff  Allergies  Meds       Reviewed and updated as needed this visit by Provider         ROS:  Constitutional, neuro, ENT, endocrine, pulmonary, cardiac, gastrointestinal, genitourinary, musculoskeletal, integument and psychiatric systems are negative, except as otherwise noted.    OBJECTIVE:                                                    /82  Pulse 80  Temp 98.8  F (37.1  C) (Oral)  Ht 5' 6\" (1.676 m)  Wt 146 lb (66.2 kg)  SpO2 99%  BMI 23.57 kg/m2  Body mass index is 23.57 kg/(m^2).  GENERAL APPEARANCE: healthy, alert and no distress  EYES: Eyes grossly normal to inspection and conjunctivae and sclerae normal  HENT: ear canals and TM's normal, nose and mouth without ulcers or lesions and no pharyngeal exudates or pus points, no uvular deviation, tender over right maxillary sinus+  NECK: trachea midline and normal to palpation  RESP: lungs clear to auscultation - no rales, rhonchi or wheezes  CV: regular rates and rhythm, normal S1 S2, no S3 or S4 and no murmur, click or rub  ABDOMEN: soft, non-tender  MS: extremities normal- no gross deformities noted and peripheral pulses normal  SKIN: no suspicious lesions " or rashes  NEURO: Normal strength and tone, mentation intact and speech normal  PSYCH: mentation appears normal and affect normal/bright  Left Ankle and foot: edema and tenderness over the medial malleolus, no rash, peripheral pulses palpable, strength and range of motion intact.     Diagnostic test results:  Diagnostic Test Results:  No results found for this or any previous visit (from the past 24 hour(s)).     ASSESSMENT/PLAN:                                                    1. Acute sinusitis with symptoms > 10 days  -Saline sinus rinse  - amoxicillin-clavulanate (AUGMENTIN) 500-125 MG per tablet; Take 1 tablet by mouth 3 times daily  Dispense: 30 tablet; Refill: 0    2. Left ankle tendonitis  -Rest, ice  -GI side effects of medication reviewed  -Recently took part in  bike race   - XR Ankle Left G/E 3 Views; Future  - diclofenac (VOLTAREN) 50 MG EC tablet; Take 1 tablet (50 mg) by mouth 3 times daily as needed for moderate pain  Dispense: 20 tablet; Refill: 1  -Await results of X rays     3. HTN, goal below 140/90  -At goal  -continue current medication   -Avoid Over the counter decongestants     4. Prediabetes  -On Metformin       Follow up with Provider - if ankle is not better in 4 weeks then refer to Sports medicine   Otherwise follow up with me in one year      Angelina Sharma MD MPH    [facility]

## 2018-07-11 NOTE — PATIENT INSTRUCTIONS
At [facility], we strive to deliver an exceptional experience to you, every time we see you.  If you receive a survey in the mail, please send us back your thoughts. We really do value your feedback.    Based on your medical history, these are the current health maintenance/preventive care services that you are due for (some may have been done at this visit.)  Health Maintenance Due   Topic Date Due     TOBACCO CESSATION COUNSELING Q1 YR  1975     HIV SCREEN (SYSTEM ASSIGNED)  08/01/1993       Suggested websites for health information:  Www.[employer].org : Up to date and easily searchable information on multiple topics.  Www.medlineplus.gov : medication info, interactive tutorials, watch real surgeries online  Www.familydoctor.org : good info from the Academy of Family Physicians  Www.cdc.gov : public health info, travel advisories, epidemics (H1N1)  Www.aap.org : children's health info, normal development, vaccinations  Www.health.[facility].[place].us : [place] dept of health, public health issues in [place], N1N1    Your care team:                            Family Medicine Internal Medicine   MD Dani Phelan MD Shantel Branch-Fleming, MD Katya Georgiev PA-C Megan Hill, APRN CNP    Eduardo Vincent MD Pediatrics   Flavio Turpin, PAHEATHER Campbell, MD Wendy Jernigan APRN CNP   MD Liz eMraz MD Deborah Mielke, MD Kim Thein, APRN [facility]      Clinic hours: Monday - Thursday 7 am-7 pm; Fridays 7 am-5 pm.   Urgent care: Monday - Friday 11 am-9 pm; Saturday and Sunday 9 am-5 pm.  Pharmacy : Monday -Thursday 8 am-8 pm; Friday 8 am-6 pm; Saturday and Sunday 9 am-5 pm.     Clinic: [phone]   Pharmacy: [phone]

## 2018-07-13 NOTE — PROGRESS NOTES
Please send a letter:    Dear Dorian Husain,    X rays of the ankle did not show any bony abnormalities. Plan of care and follow up as discussed in clinic.  Please let me know if you have any questions and thank you for choosing [facility].    Regards,    Angelina Sharma MD MPH

## 2018-08-28 ENCOUNTER — OFFICE VISIT (OUTPATIENT)
Dept: FAMILY MEDICINE | Facility: CLINIC | Age: 43
End: 2018-08-28
Payer: COMMERCIAL

## 2018-08-28 VITALS
TEMPERATURE: 98.1 F | WEIGHT: 145 LBS | HEIGHT: 66 IN | BODY MASS INDEX: 23.3 KG/M2 | DIASTOLIC BLOOD PRESSURE: 80 MMHG | HEART RATE: 61 BPM | SYSTOLIC BLOOD PRESSURE: 138 MMHG | OXYGEN SATURATION: 100 %

## 2018-08-28 DIAGNOSIS — I10 HTN, GOAL BELOW 140/90: ICD-10-CM

## 2018-08-28 DIAGNOSIS — R73.03 PREDIABETES: ICD-10-CM

## 2018-08-28 DIAGNOSIS — R07.0 THROAT PAIN: Primary | ICD-10-CM

## 2018-08-28 LAB
DEPRECATED S PYO AG THROAT QL EIA: NORMAL
SPECIMEN SOURCE: NORMAL

## 2018-08-28 PROCEDURE — 99214 OFFICE O/P EST MOD 30 MIN: CPT | Performed by: PREVENTIVE MEDICINE

## 2018-08-28 PROCEDURE — 87081 CULTURE SCREEN ONLY: CPT | Performed by: PREVENTIVE MEDICINE

## 2018-08-28 PROCEDURE — 87880 STREP A ASSAY W/OPTIC: CPT | Performed by: PREVENTIVE MEDICINE

## 2018-08-28 ASSESSMENT — PAIN SCALES - GENERAL: PAINLEVEL: SEVERE PAIN (7)

## 2018-08-28 NOTE — PROGRESS NOTES
SUBJECTIVE:   Dorian Husain is a 43 year old male who presents to clinic today for the following health issues:      RESPIRATORY SYMPTOMS      Duration: x 6-8 weeks    Description  sore throat    Severity: moderate    Accompanying signs and symptoms: None    History (predisposing factors):  none    Precipitating or alleviating factors: None    Therapies tried and outcome:  none    Feels more in the inside  Bilateral+  No dysphagia  No odynophagia  Throat feels heavy  Not more with exertion  Stress test negative within the last 6 months (3/2018)  No definite shortness of breath  Felt this was cold symptoms, but not gone away  Symptoms are not increasing  No post nasal drainage  Completed 10 days of Augmentin   No reflux  No emesis  No hoarseness   1-2 cigarettes per day  No oral ulcers     Hypertension Follow-up      Outpatient blood pressures are being checked at home.  Results are less than 140/90.    Low Salt Diet: low salt      Problem list and histories reviewed & adjusted, as indicated.  Additional history: as documented    Patient Active Problem List   Diagnosis     Family history of ischemic heart disease     Mixed hyperlipidemia     Prediabetes     HTN, goal below 140/90     No past surgical history on file.    Social History   Substance Use Topics     Smoking status: Light Tobacco Smoker     Smokeless tobacco: Never Used     Alcohol use Not on file     Family History   Problem Relation Age of Onset     Cerebrovascular Disease Father          Current Outpatient Prescriptions   Medication Sig Dispense Refill     hydrochlorothiazide (HYDRODIURIL) 25 MG tablet Take 1 tablet (25 mg) by mouth daily 90 tablet 1     lidocaine, viscous, (XYLOCAINE) 2 % solution Take 15 mLs by mouth every 6 hours as needed for moderate pain 100 mL 0     metFORMIN (GLUCOPHAGE-XR) 500 MG 24 hr tablet TAKE 2 TABLETS (1,000 MG) BY MOUTH DAILY. 180 tablet 1     omeprazole (PRILOSEC) 20 MG CR capsule Take 1 capsule (20 mg) by mouth  "daily 30 capsule 1     amoxicillin-clavulanate (AUGMENTIN) 500-125 MG per tablet Take 1 tablet by mouth 3 times daily 30 tablet 0     No Known Allergies  BP Readings from Last 3 Encounters:   08/28/18 138/80   07/11/18 134/82   07/09/18 136/86    Wt Readings from Last 3 Encounters:   08/28/18 145 lb (65.8 kg)   07/11/18 146 lb (66.2 kg)   07/09/18 144 lb 12.8 oz (65.7 kg)                  Labs reviewed in EPIC    Reviewed and updated as needed this visit by clinical staff  Tobacco  Allergies  Meds       Reviewed and updated as needed this visit by Provider         ROS:  Constitutional, neuro, ENT, endocrine, pulmonary, cardiac, gastrointestinal, genitourinary, musculoskeletal, integument and psychiatric systems are negative, except as otherwise noted.    OBJECTIVE:                                                    /80  Pulse 61  Temp 98.1  F (36.7  C) (Oral)  Ht 5' 6\" (1.676 m)  Wt 145 lb (65.8 kg)  SpO2 100%  BMI 23.4 kg/m2  Body mass index is 23.4 kg/(m^2).  GENERAL APPEARANCE: healthy, alert and no distress  EYES: Eyes grossly normal to inspection, conjunctivae and sclerae normal and lids and lashes normal  HENT: ear canals and TM's normal, nose and mouth without ulcers or lesions and no pharyngeal exudates or pus points, no uvular deviation, no oral lesions   NECK: no asymmetry, masses, or scars, thyroid normal to palpation and trachea midline and normal to palpation  RESP: lungs clear to auscultation - no rales, rhonchi or wheezes  CV: regular rates and rhythm, normal S1 S2, no S3 or S4 and no murmur, click or rub  ABDOMEN: soft, non-tender  MS: extremities normal- no gross deformities noted  SKIN: no suspicious lesions or rashes  NEURO: Normal strength and tone, mentation intact and speech normal  PSYCH: mentation appears normal    Diagnostic test results:  Diagnostic Test Results:  Results for orders placed or performed in visit on 08/28/18 (from the past 24 hour(s))   Strep, Rapid Screen "   Result Value Ref Range    Specimen Description Throat     Rapid Strep A Screen       NEGATIVE: No Group A streptococcal antigen detected by immunoassay, await culture report.        ASSESSMENT/PLAN:                                                    1. Throat pain  -Await strep culture  -Saline gargles  -Reassured patient that we do not need to repeat antibiotics at this time   - Strep, Rapid Screen  - OTOLARYNGOLOGY REFERRAL  - lidocaine, viscous, (XYLOCAINE) 2 % solution; Take 15 mLs by mouth every 6 hours as needed for moderate pain  Dispense: 100 mL; Refill: 0  - omeprazole (PRILOSEC) 20 MG CR capsule; Take 1 capsule (20 mg) by mouth daily  Dispense: 30 capsule; Refill: 1  -Added a PPI to cover for possible GERD    2. Prediabetes  -On Metformin     3. HTN, goal below 140/90  -At goal  -Continue current medication       Follow up with Provider - 1 year.       Angelina Sharma MD MPH    [facility]

## 2018-08-28 NOTE — PROGRESS NOTES
Results discussed directly with patient while patient was present. Any further details documented in the note.   Angelina Sharma MD

## 2018-08-28 NOTE — LETTER
August 31, 2018      Dorian Husain  [address] [facility] [address]                Dear Dorian,    The rapid strep was negative as discussed in clinic.  Throat culture also was negative for strep infection.  Please let me know if you have any questions and thank you for choosing [facility].    Regards,    Angelina Sharma MD MPH/ag    Results for orders placed or performed in visit on 08/28/18   Strep, Rapid Screen   Result Value Ref Range    Specimen Description Throat     Rapid Strep A Screen       NEGATIVE: No Group A streptococcal antigen detected by immunoassay, await culture report.   Beta strep group A culture   Result Value Ref Range    Specimen Description Throat     Culture Micro No beta hemolytic Streptococcus Group A isolated

## 2018-08-28 NOTE — MR AVS SNAPSHOT
After Visit Summary   8/28/2018    Dorian Husain    MRN: [id number]           Patient Information     Date Of Birth          1975        Visit Information        Provider Department      8/28/2018 4:00 PM Angelina Sharma MD [facility]        Today's Diagnoses     Throat pain    -  1    Prediabetes        HTN, goal below 140/90          Care Instructions    At [facility], we strive to deliver an exceptional experience to you, every time we see you.  If you receive a survey in the mail, please send us back your thoughts. We really do value your feedback.    Based on your medical history, these are the current health maintenance/preventive care services that you are due for (some may have been done at this visit.)  Health Maintenance Due   Topic Date Due     TOBACCO CESSATION COUNSELING Q1 YR  1975     HIV SCREEN (SYSTEM ASSIGNED)  08/01/1993       Suggested websites for health information:  Www.Acsis : Up to date and easily searchable information on multiple topics.  Www.medlineplus.gov : medication info, interactive tutorials, watch real surgeries online  Www.familydoctor.org : good info from the Academy of Family Physicians  Www.cdc.gov : public health info, travel advisories, epidemics (H1N1)  Www.aap.org : children's health info, normal development, vaccinations  Www.health.state[place] : [place] dept of health, public health issues in [place], N1N1    Your care team:                            Family Medicine Internal Medicine   MD Dani Phelan MD Shantel Branch-Fleming, MD Katya Georgiev PA-C Megan Hill, JOHANNY Vincent MD Pediatrics   LUANA Mendez, MD Wendy Jernigan APRN CNP   MD Liz Meraz MD Deborah Mielke, MD Kim Thein, APRN [facility]      Clinic hours: Monday - Thursday 7 am-7 pm; Fridays 7 am-5 pm.   Urgent care: Monday - Friday 11 am-9 pm; Saturday and  Sunday 9 am-5 pm.  Pharmacy : Monday -Thursday 8 am-8 pm; Friday 8 am-6 pm; Saturday and Sunday 9 am-5 pm.     Clinic: [phone]   Pharmacy: [phone]              Follow-ups after your visit        Additional Services     OTOLARYNGOLOGY REFERRAL       Your provider has referred you to: FMG: [facility] [phone]   [email or web address]/  FMG: [facility] [phone]   [email or web address]/[place]/  UM: [facility] [phone]   [email or web address]/    Please be aware that coverage of these services is subject to the terms and limitations of your health insurance plan.  Call member services at your health plan with any benefit or coverage questions.      Please bring the following with you to your appointment:    (1) Any X-Rays, CTs or MRIs which have been performed.  Contact the facility where they were done to arrange for  prior to your scheduled appointment.   (2) List of current medications  (3) This referral request   (4) Any documents/labs given to you for this referral                  Who to contact     If you have questions or need follow up information about today's clinic visit or your schedule please contact [facility] directly at [phone].  Normal or non-critical lab and imaging results will be communicated to you by MyChart, letter or phone within 4 business days after the clinic has received the results. If you do not hear from us within 7 days, please contact the clinic through MyChart or phone. If you have a critical or abnormal lab result, we will notify you by phone as soon as possible.  Submit refill requests through [employer] or call your pharmacy and they will forward the refill request to us. Please allow 3 business days for your refill to be completed.          Additional  "Information About Your Visit        MyChart Information     [employer] lets you send messages to your doctor, view your test results, renew your prescriptions, schedule appointments and more. To sign up, go to [email or web address] . Click on \"Log in\" on the left side of the screen, which will take you to the Welcome page. Then click on \"Sign up Now\" on the right side of the page.     You will be asked to enter the access code listed below, as well as some personal information. Please follow the directions to create your username and password.     Your access code is: [email or web address]  Expires: 2018  2:03 PM     Your access code will  in 90 days. If you need help or a new code, please call your [facility] clinic or [phone].        Care EveryWhere ID     This is your Care EveryWhere ID. This could be used by other organizations to access your [facility] medical records  [id number]        Your Vitals Were     Pulse Temperature Height Pulse Oximetry BMI (Body Mass Index)       61 98.1  F (36.7  C) (Oral) 5' 6\" (1.676 m) 100% 23.4 kg/m2        Blood Pressure from Last 3 Encounters:   18 138/80   18 134/82   18 136/86    Weight from Last 3 Encounters:   18 145 lb (65.8 kg)   18 146 lb (66.2 kg)   18 144 lb 12.8 oz (65.7 kg)              We Performed the Following     OTOLARYNGOLOGY REFERRAL     Strep, Rapid Screen          Today's Medication Changes          These changes are accurate as of 18  4:33 PM.  If you have any questions, ask your nurse or doctor.               Start taking these medicines.        Dose/Directions    lidocaine (viscous) 2 % solution   Commonly known as:  XYLOCAINE   Used for:  Throat pain   Started by:  Angelina Sharma MD        Dose:  15 mL   Take 15 mLs by mouth every 6 hours as needed for moderate pain   Quantity:  100 mL   Refills:  0       omeprazole 20 MG CR capsule   Commonly known as:  priLOSEC   Used for:  Throat pain   Started by:  " Angelina Sharma MD        Dose:  20 mg   Take 1 capsule (20 mg) by mouth daily   Quantity:  30 capsule   Refills:  1         Stop taking these medicines if you haven't already. Please contact your care team if you have questions.     diclofenac 50 MG EC tablet   Commonly known as:  VOLTAREN   Stopped by:  Angelina Sharma MD                Where to get your medicines      These medications were sent to [facility] [id number] [facility] - [place] , [place] - [address]  [address] [place] [address]     Phone:  [phone]     lidocaine (viscous) 2 % solution    omeprazole 20 MG CR capsule                Primary Care Provider Fax #    Physician No Ref-Primary [phone]       No address on file        Equal Access to Services     KAREN LOGAN : Lupe Wheeler, khadijah richardson, ross weberalmajoselin rosales, riccardo rosado . [facility] [phone].    ATENCIÓN: Si habla español, tiene a cruz disposición servicios gratuitos de asistencia lingüística. Llame al [phone].    We comply with applicable federal civil rights laws and [place] laws. We do not discriminate on the basis of race, color, national origin, age, disability, sex, sexual orientation, or gender identity.            Thank you!     Thank you for choosing [facility]  for your care. Our goal is always to provide you with excellent care. Hearing back from our patients is one way we can continue to improve our services. Please take a few minutes to complete the written survey that you may receive in the mail after your visit with us. Thank you!             Your Updated Medication List - Protect others around you: Learn how to safely use, store and throw away your medicines at www.disposemymeds.org.          This list is accurate as of 8/28/18  4:33 PM.  Always use your most recent med list.                   Brand Name Dispense Instructions for use Diagnosis    amoxicillin-clavulanate 500-125 MG per  tablet    AUGMENTIN    30 tablet    Take 1 tablet by mouth 3 times daily    Acute sinusitis with symptoms > 10 days       hydrochlorothiazide 25 MG tablet    HYDRODIURIL    90 tablet    Take 1 tablet (25 mg) by mouth daily    HTN, goal below 140/90       lidocaine (viscous) 2 % solution    XYLOCAINE    100 mL    Take 15 mLs by mouth every 6 hours as needed for moderate pain    Throat pain       metFORMIN 500 MG 24 hr tablet    GLUCOPHAGE-XR    180 tablet    TAKE 2 TABLETS (1,000 MG) BY MOUTH DAILY.    Prediabetes       omeprazole 20 MG CR capsule    priLOSEC    30 capsule    Take 1 capsule (20 mg) by mouth daily    Throat pain

## 2018-08-28 NOTE — PATIENT INSTRUCTIONS
At [facility], we strive to deliver an exceptional experience to you, every time we see you.  If you receive a survey in the mail, please send us back your thoughts. We really do value your feedback.    Based on your medical history, these are the current health maintenance/preventive care services that you are due for (some may have been done at this visit.)  Health Maintenance Due   Topic Date Due     TOBACCO CESSATION COUNSELING Q1 YR  1975     HIV SCREEN (SYSTEM ASSIGNED)  08/01/1993       Suggested websites for health information:  Www.[employer] [place].org : Up to date and easily searchable information on multiple topics.  Www.medlineplus.gov : medication info, interactive tutorials, watch real surgeries online  Www.familydoctor.org : good info from the Academy of Family Physicians  Www.cdc.gov : public health info, travel advisories, epidemics (H1N1)  Www.aap.org : children's health info, normal development, vaccinations  Www.health.[facility].[place].us : [place] dept of health, public health issues in [place], N1N1    Your care team:                            Family Medicine Internal Medicine   MD Dani Phelan MD Shantel Branch-Fleming, MD Katya Georgiev PA-C Megan Hill, APRN CNP    Eduardo Vincent MD Pediatrics   Flavio Turpin, PAHEATHER Campbell, MD Wendy Jernigan APRN CNP   MD Liz Meraz MD Deborah Mielke, MD Kim Thein, APRN [facility]      Clinic hours: Monday - Thursday 7 am-7 pm; Fridays 7 am-5 pm.   Urgent care: Monday - Friday 11 am-9 pm; Saturday and Sunday 9 am-5 pm.  Pharmacy : Monday -Thursday 8 am-8 pm; Friday 8 am-6 pm; Saturday and Sunday 9 am-5 pm.     Clinic: [phone]   Pharmacy: [phone]

## 2018-08-29 LAB
BACTERIA SPEC CULT: NORMAL
SPECIMEN SOURCE: NORMAL

## 2018-08-31 NOTE — PROGRESS NOTES
Please send a letter:    Dear Dorian Husain,    The rapid strep was negative as discussed in clinic.  Throat culture also was negative for strep infection.  Please let me know if you have any questions and thank you for choosing [facility].    Regards,    Angelina Sharma MD MPH

## 2018-09-12 ENCOUNTER — OFFICE VISIT (OUTPATIENT)
Dept: OTOLARYNGOLOGY | Facility: CLINIC | Age: 43
End: 2018-09-12

## 2018-09-12 VITALS
SYSTOLIC BLOOD PRESSURE: 128 MMHG | RESPIRATION RATE: 18 BRPM | HEART RATE: 71 BPM | DIASTOLIC BLOOD PRESSURE: 76 MMHG | OXYGEN SATURATION: 100 % | HEIGHT: 66 IN | BODY MASS INDEX: 23.5 KG/M2 | WEIGHT: 146.2 LBS

## 2018-09-12 DIAGNOSIS — R09.A2 GLOBUS SENSATION: ICD-10-CM

## 2018-09-12 DIAGNOSIS — R07.0 THROAT PAIN: Primary | ICD-10-CM

## 2018-09-12 PROCEDURE — 31575 DIAGNOSTIC LARYNGOSCOPY: CPT | Performed by: OTOLARYNGOLOGY

## 2018-09-12 PROCEDURE — 99244 OFF/OP CNSLTJ NEW/EST MOD 40: CPT | Mod: 25 | Performed by: OTOLARYNGOLOGY

## 2018-09-12 NOTE — PROGRESS NOTES
I am seeing this patient in consultation for sore throat at the request of the provider Dr. Angelina Sharma.       Chief Complaint - sore throat    History of Present Illness - Dorian Husain is a 43 year old male who presents with a history of sore throat. This has been going on for 3 months. They describe the sore throat as burning, feeling full located center and both anterior sides. Voicing has seemed a little changed. No cough. No hemoptysis. They note rare history of relux. Not worse with lying down. No dysphagia. Feels like he has a lump in throat. Rare smoker. Has tried reflux medication, didn't help. Antibiotic didn't help. Family history of thyroid problems. Doesn't talk a lot for work. Wears ear plugs for work. Had a little blood on toilet paper with constipation.     Past Medical History -   Patient Active Problem List   Diagnosis     Family history of ischemic heart disease     Mixed hyperlipidemia     Prediabetes     HTN, goal below 140/90       Current Medications -   Current Outpatient Prescriptions:      hydrochlorothiazide (HYDRODIURIL) 25 MG tablet, Take 1 tablet (25 mg) by mouth daily, Disp: 90 tablet, Rfl: 1     metFORMIN (GLUCOPHAGE-XR) 500 MG 24 hr tablet, TAKE 2 TABLETS (1,000 MG) BY MOUTH DAILY., Disp: 180 tablet, Rfl: 1     amoxicillin-clavulanate (AUGMENTIN) 500-125 MG per tablet, Take 1 tablet by mouth 3 times daily (Patient not taking: Reported on 9/12/2018), Disp: 30 tablet, Rfl: 0     lidocaine, viscous, (XYLOCAINE) 2 % solution, Take 15 mLs by mouth every 6 hours as needed for moderate pain (Patient not taking: Reported on 9/12/2018), Disp: 100 mL, Rfl: 0     omeprazole (PRILOSEC) 20 MG CR capsule, Take 1 capsule (20 mg) by mouth daily (Patient not taking: Reported on 9/12/2018), Disp: 30 capsule, Rfl: 1    Allergies - No Known Allergies    Social History -   Social History     Social History     Marital status:      Spouse name: N/A     Number of children: N/A     Years of  "education: N/A     Social History Main Topics     Smoking status: Light Tobacco Smoker     Smokeless tobacco: Never Used     Alcohol use None     Drug use: None     Sexual activity: Not Asked     Other Topics Concern     None     Social History Narrative       Family History -   Family History   Problem Relation Age of Onset     Cerebrovascular Disease Father        Review of Systems - As per HPI and PMHx, denies allergies, otherwise 10+ comprehensive system review is negative.    Physical Exam  /76 (Cuff Size: Adult Regular)  Pulse 71  Resp 18  Ht 1.676 m (5' 6\")  Wt 66.3 kg (146 lb 3.2 oz)  SpO2 100%  BMI 23.6 kg/m2  General - The patient is in no distress. Alert and oriented to person and place, answers questions and cooperates with examination appropriately.   Voice and Breathing - The patient was breathing comfortably without the use of accessory muscles. There was no wheezing, stridor, or stertor.  The patients voice was clear and strong.  Eyes - Extraocular movements intact.  Sclera were not icteric or injected, conjunctiva were pink and moist.  Mouth - Examination of the oral cavity showed pink, healthy oral mucosa. No lesions or ulcerations noted.  The tongue was mobile and midline.  Throat - The walls of the oropharynx were smooth, symmetric, and had no lesions or ulcerations.  The tonsillar pillars and soft palate were symmetric.  The uvula was midline on elevation. Tonsils 2+, no exudate or edema. No tenderness to palpation. No postnasal drainage.  Nose - External contour is symmetric, no gross deflection or scars.  Nasal mucosa is pink and moist with no abnormal mucus.  The septum was midline and non-obstructive, turbinates of normal size and position.  No polyps, masses, or purulence noted on examination.  Neck -  Palpation of the occipital, submental, submandibular, internal jugular chain, and supraclavicular nodes did not demonstrate any abnormal lymph nodes or masses. No parotid masses. " Palpation of the thyroid was soft and smooth, with no nodules or goiter appreciated.  The trachea was mobile and midline.  Neurologic - CN II-XII are grossly intact, no focal neurologic deficits.   Cardiovascular - carotid pulses are 2+ bilaterally, regular rhythm    Procedure: Flexible Endoscopy  Indication: Sore Throat    To best visualize the upper airway anatomy and due to the chief complaint and HPI, I proceeded with a fiberoptic examination. Color photographs were taken for the medical record. First I sprayed the left side of the nose with a mixture of lidocaine and neosynephrine.  I then passed the scope through the nasal cavity.  The nasal cavity was unremarkable aside from septal deviation to the right. The nasopharynx was mucosally covered and symmetric.  The Eustachian tube openings were unobstructed.  Going further down I had a clear view of the base of tongue which had normal appearing lingual tonsillar tissue.  The base of tongue was free of lesions, masses, and the vallecula was open.  The epiglottis was smooth and mucosally covered.  The supraglottic larynx was then clearly visualized. It was normal. No masses or erythema. No lesions. The vocal cords moved smoothly and symmetrically, they were pearly white and no lesions were seen.  The pyriform sinuses were open, and the limited view of the postcricoid region did not show any lesions.      A/P - Dorian Husain is a 43 year old male with a sore throat and globus.  He has no concerning signs or symptoms and exam revealed no abnormal findings on laryngoscopy and neck examination.  The etiology.  He denies significant acid reflux, new stress or anxiety, postnasal drainage, or breathing difficulty.  He does admit to some dryness may be he is having thicker mucus.  I advised Biotene mouth rinse, drinking more water throughout the day, and using a humidifier at night.  Return if this does not go away or worsens.      Dr. Truong  Arian  Otolaryngology  [facility]

## 2018-09-12 NOTE — LETTER
9/12/2018         RE: Dorian Husain  [address] [facility] [address]        Dear Colleague,    Thank you for referring your patient, Dorian Husain, to the [facility]. Please see a copy of my visit note below.    I am seeing this patient in consultation for sore throat at the request of the provider Dr. Angelina Sharma.       Chief Complaint - sore throat    History of Present Illness - Dorian Husain is a 43 year old male who presents with a history of sore throat. This has been going on for 3 months. They describe the sore throat as burning, feeling full located center and both anterior sides. Voicing has seemed a little changed. No cough. No hemoptysis. They note rare history of relux. Not worse with lying down. No dysphagia. Feels like he has a lump in throat. Rare smoker. Has tried reflux medication, didn't help. Antibiotic didn't help. Family history of thyroid problems. Doesn't talk a lot for work. Wears ear plugs for work. Had a little blood on toilet paper with constipation.     Past Medical History -   Patient Active Problem List   Diagnosis     Family history of ischemic heart disease     Mixed hyperlipidemia     Prediabetes     HTN, goal below 140/90       Current Medications -   Current Outpatient Prescriptions:      hydrochlorothiazide (HYDRODIURIL) 25 MG tablet, Take 1 tablet (25 mg) by mouth daily, Disp: 90 tablet, Rfl: 1     metFORMIN (GLUCOPHAGE-XR) 500 MG 24 hr tablet, TAKE 2 TABLETS (1,000 MG) BY MOUTH DAILY., Disp: 180 tablet, Rfl: 1     amoxicillin-clavulanate (AUGMENTIN) 500-125 MG per tablet, Take 1 tablet by mouth 3 times daily (Patient not taking: Reported on 9/12/2018), Disp: 30 tablet, Rfl: 0     lidocaine, viscous, (XYLOCAINE) 2 % solution, Take 15 mLs by mouth every 6 hours as needed for moderate pain (Patient not taking: Reported on 9/12/2018), Disp: 100 mL, Rfl: 0     omeprazole (PRILOSEC) 20 MG CR capsule, Take 1 capsule (20 mg) by mouth daily  "(Patient not taking: Reported on 9/12/2018), Disp: 30 capsule, Rfl: 1    Allergies - No Known Allergies    Social History -   Social History     Social History     Marital status:      Spouse name: N/A     Number of children: N/A     Years of education: N/A     Social History Main Topics     Smoking status: Light Tobacco Smoker     Smokeless tobacco: Never Used     Alcohol use None     Drug use: None     Sexual activity: Not Asked     Other Topics Concern     None     Social History Narrative       Family History -   Family History   Problem Relation Age of Onset     Cerebrovascular Disease Father        Review of Systems - As per HPI and PMHx, denies allergies, otherwise 10+ comprehensive system review is negative.    Physical Exam  /76 (Cuff Size: Adult Regular)  Pulse 71  Resp 18  Ht 1.676 m (5' 6\")  Wt 66.3 kg (146 lb 3.2 oz)  SpO2 100%  BMI 23.6 kg/m2  General - The patient is in no distress. Alert and oriented to person and place, answers questions and cooperates with examination appropriately.   Voice and Breathing - The patient was breathing comfortably without the use of accessory muscles. There was no wheezing, stridor, or stertor.  The patients voice was clear and strong.  Eyes - Extraocular movements intact.  Sclera were not icteric or injected, conjunctiva were pink and moist.  Mouth - Examination of the oral cavity showed pink, healthy oral mucosa. No lesions or ulcerations noted.  The tongue was mobile and midline.  Throat - The walls of the oropharynx were smooth, symmetric, and had no lesions or ulcerations.  The tonsillar pillars and soft palate were symmetric.  The uvula was midline on elevation. Tonsils 2+, no exudate or edema. No tenderness to palpation. No postnasal drainage.  Nose - External contour is symmetric, no gross deflection or scars.  Nasal mucosa is pink and moist with no abnormal mucus.  The septum was midline and non-obstructive, turbinates of normal size and " position.  No polyps, masses, or purulence noted on examination.  Neck -  Palpation of the occipital, submental, submandibular, internal jugular chain, and supraclavicular nodes did not demonstrate any abnormal lymph nodes or masses. No parotid masses. Palpation of the thyroid was soft and smooth, with no nodules or goiter appreciated.  The trachea was mobile and midline.  Neurologic - CN II-XII are grossly intact, no focal neurologic deficits.   Cardiovascular - carotid pulses are 2+ bilaterally, regular rhythm    Procedure: Flexible Endoscopy  Indication: Sore Throat    To best visualize the upper airway anatomy and due to the chief complaint and HPI, I proceeded with a fiberoptic examination. Color photographs were taken for the medical record. First I sprayed the left side of the nose with a mixture of lidocaine and neosynephrine.  I then passed the scope through the nasal cavity.  The nasal cavity was unremarkable aside from septal deviation to the right. The nasopharynx was mucosally covered and symmetric.  The Eustachian tube openings were unobstructed.  Going further down I had a clear view of the base of tongue which had normal appearing lingual tonsillar tissue.  The base of tongue was free of lesions, masses, and the vallecula was open.  The epiglottis was smooth and mucosally covered.  The supraglottic larynx was then clearly visualized. It was normal. No masses or erythema. No lesions. The vocal cords moved smoothly and symmetrically, they were pearly white and no lesions were seen.  The pyriform sinuses were open, and the limited view of the postcricoid region did not show any lesions.      A/P - Dorian Husain is a 43 year old male with a sore throat and globus.  He has no concerning signs or symptoms and exam revealed no abnormal findings on laryngoscopy and neck examination.  The etiology.  He denies significant acid reflux, new stress or anxiety, postnasal drainage, or breathing difficulty.  He  does admit to some dryness may be he is having thicker mucus.  I advised Biotene mouth rinse, drinking more water throughout the day, and using a humidifier at night.  Return if this does not go away or worsens.      Dr. Alexi Don  Otolaryngology  [facility]      Again, thank you for allowing me to participate in the care of your patient.        Sincerely,        Alexi Don MD

## 2018-09-12 NOTE — MR AVS SNAPSHOT
After Visit Summary   9/12/2018    Dorian Husain    MRN: [id number]           Patient Information     Date Of Birth          1975        Visit Information        Provider Department      9/12/2018 1:45 PM Alex[facility] Don MD [facility]        Today's Diagnoses     Throat pain    -  1    Globus sensation          Care Instructions    General Scheduling Information  To schedule your CT/MRI scan, please contact [place] at [phone] [address] [place] [address]    To schedule your Surgery, please contact our Specialty Schedulers at [phone]    ENT Clinic Locations Clinic Hours Telephone Number     [facility]  [address]  [place] [address]   Tuesday:       8:00am -- 4:00pm    Wednesday:  8:00am - 4:00pm   To schedule an appointment with   Dr. Don,   please contact our   Specialty Scheduling Department at:     [phone]       [facility]  [address] [place] [address]   Friday:          8:00am - 4:00pm         Urgent Care Locations Clinic Hours Telephone Numbers     [facility] [place]  [address] [place] [address]     Monday-Friday:     11:00pm - 9:00pm    Saturday-Sunday:  9:00am - 5:00pm   [phone]     [facility]  [address] [place] [address]     Monday-Friday:      5:00pm - 9:00pm     Saturday-Sunday:  9:00am - 5:00pm   [phone]                   Follow-ups after your visit        Who to contact     If you have questions or need follow up information about today's clinic visit or your schedule please contact [facility] directly at [phone].  Normal or non-critical lab and imaging results will be communicated to you by MyChart, letter or phone within 4 business days after the clinic has received the results. If you do not hear from us within 7 days, please contact the clinic through MyChart or phone. If you have a critical or abnormal lab result, we  "will notify you by phone as soon as possible.  Submit refill requests through [employer] or call your pharmacy and they will forward the refill request to us. Please allow 3 business days for your refill to be completed.          Additional Information About Your Visit        Care EveryWhere ID     This is your Care EveryWhere ID. This could be used by other organizations to access your [facility] medical records  [id number]        Your Vitals Were     Pulse Respirations Height Pulse Oximetry BMI (Body Mass Index)       71 18 1.676 m (5' 6\") 100% 23.6 kg/m2        Blood Pressure from Last 3 Encounters:   09/12/18 128/76   08/28/18 138/80   07/11/18 134/82    Weight from Last 3 Encounters:   09/12/18 66.3 kg (146 lb 3.2 oz)   08/28/18 65.8 kg (145 lb)   07/11/18 66.2 kg (146 lb)              We Performed the Following     Laryngoscopy, Fiber        Primary Care Provider Fax #    Physician No Ref-Primary [phone]       No address on file        Equal Access to Services     [facility] : Hadii renée guerreroo Soadam, waaxda luqadaha, qaybta kaalmada adealec, riccardo rosado . So [facility] [phone].    ATENCIÓN: Si habla español, tiene a cruz disposición servicios gratuitos de asistencia lingüística. Llame al [phone].    We comply with applicable federal civil rights laws and [place] laws. We do not discriminate on the basis of race, color, national origin, age, disability, sex, sexual orientation, or gender identity.            Thank you!     Thank you for choosing [facility]  for your care. Our goal is always to provide you with excellent care. Hearing back from our patients is one way we can continue to improve our services. Please take a few minutes to complete the written survey that you may receive in the mail after your visit with us. Thank you!             Your Updated Medication List - Protect others around you: Learn how to safely use, store and throw away your " medicines at www.disposemymeds.org.          This list is accurate as of 9/12/18  2:41 PM.  Always use your most recent med list.                   Brand Name Dispense Instructions for use Diagnosis    amoxicillin-clavulanate 500-125 MG per tablet    AUGMENTIN    30 tablet    Take 1 tablet by mouth 3 times daily    Acute sinusitis with symptoms > 10 days       hydrochlorothiazide 25 MG tablet    HYDRODIURIL    90 tablet    Take 1 tablet (25 mg) by mouth daily    HTN, goal below 140/90       lidocaine (viscous) 2 % solution    XYLOCAINE    100 mL    Take 15 mLs by mouth every 6 hours as needed for moderate pain    Throat pain       metFORMIN 500 MG 24 hr tablet    GLUCOPHAGE-XR    180 tablet    TAKE 2 TABLETS (1,000 MG) BY MOUTH DAILY.    Prediabetes       omeprazole 20 MG CR capsule    priLOSEC    30 capsule    Take 1 capsule (20 mg) by mouth daily    Throat pain

## 2018-11-23 DIAGNOSIS — R73.03 PREDIABETES: ICD-10-CM

## 2018-11-23 NOTE — TELEPHONE ENCOUNTER
"Requested Prescriptions   Pending Prescriptions Disp Refills     metFORMIN (GLUCOPHAGE-XR) 500 MG 24 hr tablet [Pharmacy Med Name: METFORMIN  MG TABLET] 180 tablet 1    Last Written Prescription Date:  5/23/18  Last Fill Quantity: 180,  # refills: 1   Last office visit: 8/28/2018 with prescribing provider:  Zachary   Future Office Visit:     Sig: TAKE 2 TABLETS (1,000 MG) BY MOUTH DAILY.    Biguanide Agents Failed    11/23/2018  1:57 AM       Failed - Patient has had a Microalbumin in the past 15 mos.    No lab results found.         Failed - Patient has documented A1c within the specified period of time.    If HgbA1C is 8 or greater, it needs to be on file within the past 3 months.  If less than 8, must be on file within the past 6 months.     Recent Labs   Lab Test  03/02/18   0728   A1C  5.7            Passed - Blood pressure less than 140/90 in past 6 months    BP Readings from Last 3 Encounters:   09/12/18 128/76   08/28/18 138/80   07/11/18 134/82                Passed - Patient has documented LDL within the past 12 mos.    Recent Labs   Lab Test  03/02/18   0728   LDL  71            Passed - Patient is age 10 or older       Passed - Patient's CR is NOT>1.4 OR Patient's EGFR is NOT<45 within past 12 mos.    Recent Labs   Lab Test  03/02/18   0728   GFRESTIMATED  >90   GFRESTBLACK  >90       Recent Labs   Lab Test  03/02/18   0728   CR  0.88            Passed - Patient does NOT have a diagnosis of CHF.       Passed - Recent (6 mo) or future (30 days) visit within the authorizing provider's specialty    Patient had office visit in the last 6 months or has a visit in the next 30 days with authorizing provider or within the authorizing provider's specialty.  See \"Patient Info\" tab in inbasket, or \"Choose Columns\" in Meds & Orders section of the refill encounter.              "

## 2018-11-27 RX ORDER — METFORMIN HCL 500 MG
TABLET, EXTENDED RELEASE 24 HR ORAL
Qty: 180 TABLET | Refills: 0 | Status: SHIPPED | OUTPATIENT
Start: 2018-11-27 | End: 2019-03-06

## 2018-11-27 NOTE — TELEPHONE ENCOUNTER
Routing refill request to provider for review/approval because:  Labs not current:  No microalbumin on file for the patient.    Orly Aguilar RN, [facility]

## 2018-12-23 DIAGNOSIS — I10 HTN, GOAL BELOW 140/90: ICD-10-CM

## 2018-12-23 NOTE — TELEPHONE ENCOUNTER
"Requested Prescriptions   Pending Prescriptions Disp Refills     hydrochlorothiazide (HYDRODIURIL) 25 MG tablet [Pharmacy Med Name: HYDROCHLOROTHIAZIDE 25 MG TAB] 90 tablet 1        Last Written Prescription Date:  7/6/18  Last Fill Quantity: 90,  # refills: 1   Last Office Visit with [facility], [facility] or [facility] prescribing provider:  8/28/18   Future Office Visit:      Sig: TAKE 1 TABLET BY MOUTH EVERY DAY    Diuretics (Including Combos) Protocol Passed - 12/23/2018  9:07 AM       Passed - Blood pressure under 140/90 in past 12 months    BP Readings from Last 3 Encounters:   09/12/18 128/76   08/28/18 138/80   07/11/18 134/82                Passed - Recent (12 mo) or future (30 days) visit within the authorizing provider's specialty    Patient had office visit in the last 12 months or has a visit in the next 30 days with authorizing provider or within the authorizing provider's specialty.  See \"Patient Info\" tab in inbasket, or \"Choose Columns\" in Meds & Orders section of the refill encounter.             Passed - Patient is age 18 or older       Passed - Normal serum creatinine on file in past 12 months    Recent Labs   Lab Test 03/02/18  0728   CR 0.88             Passed - Normal serum potassium on file in past 12 months    Recent Labs   Lab Test 03/02/18  0728   POTASSIUM 4.0                   Passed - Normal serum sodium on file in past 12 months    Recent Labs   Lab Test 03/02/18  0728                       Robert Faarax  [facility]  "

## 2018-12-27 RX ORDER — HYDROCHLOROTHIAZIDE 25 MG/1
TABLET ORAL
Qty: 90 TABLET | Refills: 1 | Status: SHIPPED | OUTPATIENT
Start: 2018-12-27 | End: 2019-03-29

## 2019-03-06 DIAGNOSIS — R73.03 PREDIABETES: ICD-10-CM

## 2019-03-06 NOTE — LETTER
[address]  [address]  [phone]    March 8, 2019      Dorian Husain  [address] [place] [id number]      Dear Dorian,    We recently received a call from your pharmacy requesting a refill of your medication.    A review of your chart indicates that an appointment is required with your provider.  Please call the clinic at [phone] to schedule your appointment.    We have authorized one refill of your medication to allow time for you to schedule.   If you have a history of diabetes or high cholesterol, please come in fasting for the appointment. Fasting entails nothing to eat or drink 8 hours prior to your appointment; with the exception on water. You may take your medication the day of the appointment.    Thank you,      Dr Delatorre

## 2019-03-06 NOTE — TELEPHONE ENCOUNTER
"Requested Prescriptions   Pending Prescriptions Disp Refills     metFORMIN (GLUCOPHAGE-XR) 500 MG 24 hr tablet [Pharmacy Med Name: METFORMIN  MG TABLET] 180 tablet 0      Last Written Prescription Date:  11/27/18  Last Fill Quantity: 180,  # refills: 0   Last Office Visit with [facility], [facility] or [facility] prescribing provider:  08/28/18Valerio   Future Office Visit:    Sig: TAKE 2 TABLETS (1,000 MG) BY MOUTH DAILY.    Biguanide Agents Failed - 3/6/2019  1:22 AM       Failed - Patient has documented LDL within the past 12 mos.    Recent Labs   Lab Test 03/02/18  0728   LDL 71            Failed - Patient has had a Microalbumin in the past 15 mos.    No lab results found.         Failed - Patient has documented A1c within the specified period of time.    If HgbA1C is 8 or greater, it needs to be on file within the past 3 months.  If less than 8, must be on file within the past 6 months.     Recent Labs   Lab Test 03/02/18  0728   A1C 5.7            Failed - Recent (6 mo) or future (30 days) visit within the authorizing provider's specialty    Patient had office visit in the last 6 months or has a visit in the next 30 days with authorizing provider or within the authorizing provider's specialty.  See \"Patient Info\" tab in inbasket, or \"Choose Columns\" in Meds & Orders section of the refill encounter.           Passed - Blood pressure less than 140/90 in past 6 months    BP Readings from Last 3 Encounters:   09/12/18 128/76   08/28/18 138/80   07/11/18 134/82                Passed - Patient is age 10 or older       Passed - Patient's CR is NOT>1.4 OR Patient's EGFR is NOT<45 within past 12 mos.    Recent Labs   Lab Test 03/02/18  0728   GFRESTIMATED >90   GFRESTBLACK >90       Recent Labs   Lab Test 03/02/18  0728   CR 0.88            Passed - Patient does NOT have a diagnosis of CHF.       Passed - Medication is active on med list          "

## 2019-03-07 RX ORDER — METFORMIN HCL 500 MG
TABLET, EXTENDED RELEASE 24 HR ORAL
Qty: 180 TABLET | Refills: 0 | Status: SHIPPED | OUTPATIENT
Start: 2019-03-07 | End: 2019-03-29

## 2019-03-07 NOTE — TELEPHONE ENCOUNTER
Routing refill request to provider for review/approval because:  Labs not current:  LDL, Microalbumin, Hgb A1c  Patient needs to be seen because:  More than 6 months since last OV  Request fails protocol, unable to refill    Nicole Dukes RN

## 2019-03-29 ENCOUNTER — OFFICE VISIT (OUTPATIENT)
Dept: FAMILY MEDICINE | Facility: CLINIC | Age: 44
End: 2019-03-29
Payer: COMMERCIAL

## 2019-03-29 VITALS
HEIGHT: 67 IN | OXYGEN SATURATION: 98 % | TEMPERATURE: 98 F | DIASTOLIC BLOOD PRESSURE: 70 MMHG | BODY MASS INDEX: 23.07 KG/M2 | WEIGHT: 147 LBS | HEART RATE: 78 BPM | SYSTOLIC BLOOD PRESSURE: 138 MMHG

## 2019-03-29 DIAGNOSIS — R73.03 PREDIABETES: ICD-10-CM

## 2019-03-29 DIAGNOSIS — Z00.00 ROUTINE HISTORY AND PHYSICAL EXAMINATION OF ADULT: Primary | ICD-10-CM

## 2019-03-29 DIAGNOSIS — I10 HTN, GOAL BELOW 140/90: ICD-10-CM

## 2019-03-29 DIAGNOSIS — Z11.4 SCREENING FOR HIV (HUMAN IMMUNODEFICIENCY VIRUS): ICD-10-CM

## 2019-03-29 PROCEDURE — 99396 PREV VISIT EST AGE 40-64: CPT | Performed by: PREVENTIVE MEDICINE

## 2019-03-29 RX ORDER — HYDROCHLOROTHIAZIDE 25 MG/1
25 TABLET ORAL DAILY
Qty: 90 TABLET | Refills: 3 | Status: SHIPPED | OUTPATIENT
Start: 2019-03-29 | End: 2020-06-09

## 2019-03-29 RX ORDER — METFORMIN HCL 500 MG
500 TABLET, EXTENDED RELEASE 24 HR ORAL
Qty: 90 TABLET | Refills: 3 | Status: SHIPPED | OUTPATIENT
Start: 2019-03-29 | End: 2020-01-30

## 2019-03-29 ASSESSMENT — PAIN SCALES - GENERAL: PAINLEVEL: NO PAIN (0)

## 2019-03-29 ASSESSMENT — MIFFLIN-ST. JEOR: SCORE: 1512.48

## 2019-03-29 NOTE — PROGRESS NOTES
SUBJECTIVE:   CC: Mihceal Husain is an 43 year old male who presents for preventive health visit.     Healthy Habits:    Do you get at least three servings of calcium containing foods daily (dairy, green leafy vegetables, etc.)? yes    Amount of exercise or daily activities, outside of work: 3 day(s) per week, walking, and running     Problems taking medications regularly No    Medication side effects: No    Have you had an eye exam in the past two years? yes    Do you see a dentist twice per year? yes    Do you have sleep apnea, excessive snoring or daytime drowsiness?no      Hypertension Follow-up      Outpatient blood pressures are being checked at home.  Results are between 80-85 diastolic sometimes 140 systolic.    Low Salt Diet: low salt    Quit smoking       Today's PHQ-2 Score:   PHQ-2 ( 1999 Pfizer) 3/29/2019 2/28/2018   Q1: Little interest or pleasure in doing things 0 0   Q2: Feeling down, depressed or hopeless 0 0   PHQ-2 Score 0 0       Abuse: Current or Past(Physical, Sexual or Emotional)- No  Do you feel safe in your environment? Yes    Social History     Tobacco Use     Smoking status: Light Tobacco Smoker     Smokeless tobacco: Never Used   Substance Use Topics     Alcohol use: Not on file     If you drink alcohol do you typically have >3 drinks per day or >7 drinks per week? No                      Last PSA: No results found for: PSA    Reviewed orders with patient. Reviewed health maintenance and updated orders accordingly - Yes  Labs reviewed in EPIC  BP Readings from Last 3 Encounters:   03/29/19 138/70   09/12/18 128/76   08/28/18 138/80    Wt Readings from Last 3 Encounters:   03/29/19 66.7 kg (147 lb)   09/12/18 66.3 kg (146 lb 3.2 oz)   08/28/18 65.8 kg (145 lb)                  Patient Active Problem List   Diagnosis     Family history of ischemic heart disease     Mixed hyperlipidemia     Prediabetes     HTN, goal below 140/90     History reviewed. No pertinent surgical history.   "  Social History     Tobacco Use     Smoking status: Light Tobacco Smoker     Smokeless tobacco: Never Used   Substance Use Topics     Alcohol use: Not on file     Family History   Problem Relation Age of Onset     Cerebrovascular Disease Father          Current Outpatient Medications   Medication Sig Dispense Refill     hydrochlorothiazide (HYDRODIURIL) 25 MG tablet Take 1 tablet (25 mg) by mouth daily 90 tablet 3     metFORMIN (GLUCOPHAGE-XR) 500 MG 24 hr tablet Take 1 tablet (500 mg) by mouth daily (with dinner) 90 tablet 3     No Known Allergies    Reviewed and updated as needed this visit by clinical staff  Tobacco  Allergies  Meds  Problems  Med Hx  Surg Hx  Fam Hx         Reviewed and updated as needed this visit by Provider  Tobacco  Allergies  Meds  Problems  Med Hx  Surg Hx  Fam Hx        Past Medical History:   Diagnosis Date     Hyperlipidemia      Pre-diabetes       History reviewed. No pertinent surgical history.    ROS:  CONSTITUTIONAL: NEGATIVE for fever, chills, change in weight  INTEGUMENTARY/SKIN: NEGATIVE for worrisome rashes, moles or lesions  EYES: NEGATIVE for vision changes or irritation  ENT: NEGATIVE for ear, mouth and throat problems  RESP: NEGATIVE for significant cough or SOB  CV: NEGATIVE for chest pain, palpitations or peripheral edema  GI: NEGATIVE for nausea, abdominal pain, heartburn, or change in bowel habits   male: negative for dysuria, hematuria, decreased urinary stream, erectile dysfunction, urethral discharge  MUSCULOSKELETAL: NEGATIVE for significant arthralgias or myalgia  NEURO: NEGATIVE for weakness, dizziness or paresthesias  ENDOCRINE: NEGATIVE for temperature intolerance, skin/hair changes  HEME/ALLERGY/IMMUNE: NEGATIVE for bleeding problems  PSYCHIATRIC: NEGATIVE for changes in mood or affect    OBJECTIVE:   /70   Pulse 78   Temp 98  F (36.7  C) (Oral)   Ht 1.689 m (5' 6.5\")   Wt 66.7 kg (147 lb)   SpO2 98%   BMI 23.37 kg/m  "   EXAM:  GENERAL: healthy, alert and no distress  EYES: Eyes grossly normal to inspection, PERRL and conjunctivae and sclerae normal  HENT: ear canals and TM's normal, nose and mouth without ulcers or lesions  NECK: no adenopathy, no asymmetry, masses  RESP: lungs clear to auscultation - no rales, rhonchi or wheezes  CV: regular rate and rhythm, normal S1 S2, no S3 or S4, no murmur, click or rub, no peripheral edema and peripheral pulses strong  ABDOMEN: soft, nontender, no hepatosplenomegaly, no masses   MS: no gross musculoskeletal defects noted, no edema  SKIN: no suspicious lesions or rashes  NEURO: Normal strength and tone, mentation intact and speech normal  PSYCH: mentation appears normal, affect normal/bright  LYMPH: no cervical, supraclavicular adenopathy    Diagnostic Test Results:  No results found for this or any previous visit (from the past 24 hour(s)).    ASSESSMENT/PLAN:   Micheal was seen today for physical.    Diagnoses and all orders for this visit:    Routine history and physical examination of adult  -     HIV Screening; Future  -     Lipid panel reflex to direct LDL Fasting; Future  -will return for fasting labs     Prediabetes  -     Albumin Random Urine Quantitative with Creat Ratio; Future  -     Hemoglobin A1c; Future  -     metFORMIN (GLUCOPHAGE-XR) 500 MG 24 hr tablet; Take 1 tablet (500 mg) by mouth daily (with dinner)  -Has been on 500 mg once a day for several months though was prescribed 1000 mg daily     HTN, goal below 140/90  -     Albumin Random Urine Quantitative with Creat Ratio; Future  -     Basic metabolic panel; Future  -     hydrochlorothiazide (HYDRODIURIL) 25 MG tablet; Take 1 tablet (25 mg) by mouth daily  -At goal, continue current dose of medication     Screening for HIV (human immunodeficiency virus)  -     HIV Screening; Future      COUNSELING:  Reviewed preventive health counseling, as reflected in patient instructions       Regular exercise       Healthy  "diet/nutrition    BP Readings from Last 1 Encounters:   03/29/19 138/70     Estimated body mass index is 23.37 kg/m  as calculated from the following:    Height as of this encounter: 1.689 m (5' 6.5\").    Weight as of this encounter: 66.7 kg (147 lb).           reports that he has been smoking.  he has never used smokeless tobacco. Has quit tobacco since the last few weeks       Counseling Resources:  ATP IV Guidelines  Pooled Cohorts Equation Calculator  FRAX Risk Assessment  ICSI Preventive Guidelines  Dietary Guidelines for Americans, 2010  USDA's MyPlate  ASA Prophylaxis  Lung CA Screening    Angelina Sharma MD MPH    [facility]  "

## 2019-03-29 NOTE — PATIENT INSTRUCTIONS
.shelly    At [facility], we strive to deliver an exceptional experience to you, every time we see you.  If you receive a survey in the mail, please send us back your thoughts. We really do value your feedback.    Your care team:                            Family Medicine Internal Medicine   MD Dani Phelan MD Shantel Branch-Fleming, MD Katya Georgiev PA-C Megan Hill, APRN CNP    Eduardo Vincent, MD Pediatrics   LUANA Mendez, MD Wendy Jernigan APRN CNP   MD Liz Meraz MD Deborah Mielke, MD Lizeth Finnegan, APRN [facility]      Clinic hours: Monday - Thursday 7 am-7 pm; Fridays 7 am-5 pm.   Urgent care: Monday - Friday 11 am-9 pm; Saturday and Sunday 9 am-5 pm.  Pharmacy : Monday -Thursday 8 am-8 pm; Friday 8 am-6 pm; Saturday and Sunday 9 am-5 pm.     Clinic: [phone]   Pharmacy: [phone]        Preventive Health Recommendations  Male Ages 40 to 49    Yearly exam:             See your health care provider every year in order to  o   Review health changes.   o   Discuss preventive care.    o   Review your medicines if your doctor has prescribed any.    You should be tested each year for STDs (sexually transmitted diseases) if you re at risk.     Have a cholesterol test every 5 years.     Have a colonoscopy (test for colon cancer) if someone in your family has had colon cancer or polyps before age 50.     After age 45, have a diabetes test (fasting glucose). If you are at risk for diabetes, you should have this test every 3 years.      Talk with your health care provider about whether or not a prostate cancer screening test (PSA) is right for you.    Shots: Get a flu shot each year. Get a tetanus shot every 10 years.     Nutrition:    Eat at least 5 servings of fruits and vegetables daily.     Eat whole-grain bread, whole-wheat pasta and brown rice instead of white grains and rice.     Get adequate Calcium and  Vitamin D.     Lifestyle    Exercise for at least 150 minutes a week (30 minutes a day, 5 days a week). This will help you control your weight and prevent disease.     Limit alcohol to one drink per day.     No smoking.     Wear sunscreen to prevent skin cancer.     See your dentist every six months for an exam and cleaning.

## 2019-04-02 DIAGNOSIS — R73.03 PREDIABETES: ICD-10-CM

## 2019-04-02 DIAGNOSIS — Z11.4 SCREENING FOR HIV (HUMAN IMMUNODEFICIENCY VIRUS): ICD-10-CM

## 2019-04-02 DIAGNOSIS — Z00.00 ROUTINE HISTORY AND PHYSICAL EXAMINATION OF ADULT: ICD-10-CM

## 2019-04-02 DIAGNOSIS — I10 HTN, GOAL BELOW 140/90: ICD-10-CM

## 2019-04-02 LAB
ANION GAP SERPL CALCULATED.3IONS-SCNC: 8 MMOL/L (ref 3–14)
BUN SERPL-MCNC: 14 MG/DL (ref 7–30)
CALCIUM SERPL-MCNC: 9.2 MG/DL (ref 8.5–10.1)
CHLORIDE SERPL-SCNC: 97 MMOL/L (ref 94–109)
CHOLEST SERPL-MCNC: 243 MG/DL
CO2 SERPL-SCNC: 28 MMOL/L (ref 20–32)
CREAT SERPL-MCNC: 0.83 MG/DL (ref 0.66–1.25)
CREAT UR-MCNC: 125 MG/DL
GFR SERPL CREATININE-BSD FRML MDRD: >90 ML/MIN/{1.73_M2}
GLUCOSE SERPL-MCNC: 95 MG/DL (ref 70–99)
HBA1C MFR BLD: 6 % (ref 0–5.6)
HDLC SERPL-MCNC: 38 MG/DL
HIV 1+2 AB+HIV1 P24 AG SERPL QL IA: NONREACTIVE
LDLC SERPL CALC-MCNC: ABNORMAL MG/DL
LDLC SERPL DIRECT ASSAY-MCNC: 139 MG/DL
MICROALBUMIN UR-MCNC: 10 MG/L
MICROALBUMIN/CREAT UR: 8.32 MG/G CR (ref 0–17)
NONHDLC SERPL-MCNC: 205 MG/DL
POTASSIUM SERPL-SCNC: 3.9 MMOL/L (ref 3.4–5.3)
SODIUM SERPL-SCNC: 133 MMOL/L (ref 133–144)
TRIGL SERPL-MCNC: 456 MG/DL

## 2019-04-02 PROCEDURE — 87389 HIV-1 AG W/HIV-1&-2 AB AG IA: CPT | Performed by: PREVENTIVE MEDICINE

## 2019-04-02 PROCEDURE — 36415 COLL VENOUS BLD VENIPUNCTURE: CPT | Performed by: PREVENTIVE MEDICINE

## 2019-04-02 PROCEDURE — 80048 BASIC METABOLIC PNL TOTAL CA: CPT | Performed by: PREVENTIVE MEDICINE

## 2019-04-02 PROCEDURE — 82043 UR ALBUMIN QUANTITATIVE: CPT | Performed by: PREVENTIVE MEDICINE

## 2019-04-02 PROCEDURE — 80061 LIPID PANEL: CPT | Performed by: PREVENTIVE MEDICINE

## 2019-04-02 PROCEDURE — 83721 ASSAY OF BLOOD LIPOPROTEIN: CPT | Performed by: PREVENTIVE MEDICINE

## 2019-04-02 PROCEDURE — 83036 HEMOGLOBIN GLYCOSYLATED A1C: CPT | Performed by: PREVENTIVE MEDICINE

## 2019-04-02 NOTE — TELEPHONE ENCOUNTER
"Requested Prescriptions   Pending Prescriptions Disp Refills     metFORMIN (GLUCOPHAGE-XR) 500 MG 24 hr tablet [Pharmacy Med Name: METFORMIN HCL  MG TABLET] 180 tablet 0    Last Written Prescription Date:  3/29/19  Last Fill Quantity: 90,  # refills: 3   Last Office Visit with [facility], [facility] or [facility] prescribing provider:  3/29/19   Future Office Visit:      Sig: TAKE 2 TABLETS (1,000 MG) BY MOUTH DAILY.    Biguanide Agents Passed - 4/2/2019  1:34 PM       Passed - Blood pressure less than 140/90 in past 6 months    BP Readings from Last 3 Encounters:   03/29/19 138/70   09/12/18 128/76   08/28/18 138/80                Passed - Patient has documented LDL within the past 12 mos.    Recent Labs   Lab Test 04/02/19  0743   LDL Cannot estimate LDL when triglyceride exceeds 400 mg/dL  139*            Passed - Patient has had a Microalbumin in the past 15 mos.    Recent Labs   Lab Test 04/02/19  0743   MICROL 10   UMALCR 8.32            Passed - Patient is age 10 or older       Passed - Patient has documented A1c within the specified period of time.    If HgbA1C is 8 or greater, it needs to be on file within the past 3 months.  If less than 8, must be on file within the past 6 months.     Recent Labs   Lab Test 04/02/19  0743   A1C 6.0*            Passed - Patient's CR is NOT>1.4 OR Patient's EGFR is NOT<45 within past 12 mos.    Recent Labs   Lab Test 04/02/19  0743   GFRESTIMATED >90   GFRESTBLACK >90       Recent Labs   Lab Test 04/02/19  0743   CR 0.83            Passed - Patient does NOT have a diagnosis of CHF.       Passed - Medication is active on med list       Passed - Recent (6 mo) or future (30 days) visit within the authorizing provider's specialty    Patient had office visit in the last 6 months or has a visit in the next 30 days with authorizing provider or within the authorizing provider's specialty.  See \"Patient Info\" tab in inbasket, or \"Choose Columns\" in Meds & Orders section of the refill " encounter.

## 2019-04-04 RX ORDER — METFORMIN HCL 500 MG
TABLET, EXTENDED RELEASE 24 HR ORAL
Qty: 180 TABLET | Refills: 0 | OUTPATIENT
Start: 2019-04-04

## 2019-05-20 ENCOUNTER — OFFICE VISIT (OUTPATIENT)
Dept: URGENT CARE | Facility: URGENT CARE | Age: 44
End: 2019-05-20
Payer: COMMERCIAL

## 2019-05-20 VITALS
RESPIRATION RATE: 18 BRPM | SYSTOLIC BLOOD PRESSURE: 131 MMHG | OXYGEN SATURATION: 99 % | WEIGHT: 146.8 LBS | HEIGHT: 67 IN | DIASTOLIC BLOOD PRESSURE: 73 MMHG | TEMPERATURE: 98.2 F | HEART RATE: 65 BPM | BODY MASS INDEX: 23.04 KG/M2

## 2019-05-20 DIAGNOSIS — J01.10 ACUTE FRONTAL SINUSITIS, RECURRENCE NOT SPECIFIED: ICD-10-CM

## 2019-05-20 DIAGNOSIS — J20.9 ACUTE BRONCHITIS WITH SYMPTOMS > 10 DAYS: ICD-10-CM

## 2019-05-20 DIAGNOSIS — R07.0 THROAT PAIN: Primary | ICD-10-CM

## 2019-05-20 LAB
DEPRECATED S PYO AG THROAT QL EIA: NORMAL
SPECIMEN SOURCE: NORMAL

## 2019-05-20 PROCEDURE — 87081 CULTURE SCREEN ONLY: CPT | Performed by: PHYSICIAN ASSISTANT

## 2019-05-20 PROCEDURE — 87880 STREP A ASSAY W/OPTIC: CPT | Performed by: PHYSICIAN ASSISTANT

## 2019-05-20 PROCEDURE — 99214 OFFICE O/P EST MOD 30 MIN: CPT | Performed by: PHYSICIAN ASSISTANT

## 2019-05-20 RX ORDER — AZITHROMYCIN 250 MG/1
TABLET, FILM COATED ORAL
Qty: 6 TABLET | Refills: 0 | Status: SHIPPED | OUTPATIENT
Start: 2019-05-20 | End: 2019-05-29

## 2019-05-20 ASSESSMENT — MIFFLIN-ST. JEOR: SCORE: 1511.57

## 2019-05-20 ASSESSMENT — ENCOUNTER SYMPTOMS
MUSCULOSKELETAL NEGATIVE: 1
HEADACHES: 0
CHILLS: 0
PALPITATIONS: 0
CONSTITUTIONAL NEGATIVE: 1
NAUSEA: 0
EYE REDNESS: 0
MYALGIAS: 0
VOMITING: 0
DIZZINESS: 0
ADENOPATHY: 0
FREQUENCY: 0
HEMATURIA: 0
EYES NEGATIVE: 1
ABDOMINAL PAIN: 0
FEVER: 0
DIAPHORESIS: 0
DYSURIA: 0
WEAKNESS: 0
POLYDIPSIA: 0
SORE THROAT: 1
CARDIOVASCULAR NEGATIVE: 1
SINUS PRESSURE: 1
RHINORRHEA: 0
DIARRHEA: 0
EYE DISCHARGE: 0
WHEEZING: 0
EYE ITCHING: 0
GASTROINTESTINAL NEGATIVE: 1
NEUROLOGICAL NEGATIVE: 1
SHORTNESS OF BREATH: 0
ENDOCRINE NEGATIVE: 1
LIGHT-HEADEDNESS: 0
CHEST TIGHTNESS: 0
COUGH: 1

## 2019-05-20 ASSESSMENT — PAIN SCALES - GENERAL: PAINLEVEL: SEVERE PAIN (6)

## 2019-05-20 NOTE — PROGRESS NOTES
Chief Complaint:     Chief Complaint   Patient presents with     Cough     and stuffy nose, all symptoms started about 8-10 days      Fever     took ibuprofen this moring      Throat Pain     for 8-10 days        HPI: Micheal Husain is an 43 year old male who presents with dry cough, nasal congestion and sore throat. It began  10 day(s) ago and has unchanged.  Cough is nonproductive, occasional There is no shortness of breath, wheezing and chest pain.      Recent travel?  no.      ROS:     Review of Systems   Constitutional: Negative.  Negative for chills, diaphoresis and fever.   HENT: Positive for congestion, sinus pressure and sore throat. Negative for ear pain and rhinorrhea.    Eyes: Negative.  Negative for discharge, redness and itching.   Respiratory: Positive for cough. Negative for chest tightness, shortness of breath and wheezing.    Cardiovascular: Negative.  Negative for chest pain and palpitations.   Gastrointestinal: Negative.  Negative for abdominal pain, diarrhea, nausea and vomiting.   Endocrine: Negative.  Negative for polydipsia and polyuria.   Genitourinary: Negative for dysuria, frequency, hematuria and urgency.   Musculoskeletal: Negative.  Negative for myalgias.   Skin: Negative for rash.   Allergic/Immunologic: Negative for immunocompromised state.   Neurological: Negative.  Negative for dizziness, weakness, light-headedness and headaches.   Hematological: Negative for adenopathy.        Respiratory History  occasional episodes of bronchitis       Family History   Family History   Problem Relation Age of Onset     Cerebrovascular Disease Father         Problem history  Patient Active Problem List   Diagnosis     Family history of ischemic heart disease     Mixed hyperlipidemia     Prediabetes     HTN, goal below 140/90        Allergies  No Known Allergies     Social History  Social History     Socioeconomic History     Marital status:      Spouse name: Not on file     Number of  "children: Not on file     Years of education: Not on file     Highest education level: Not on file   Occupational History     Not on file   Social Needs     Financial resource strain: Not on file     Food insecurity:     Worry: Not on file     Inability: Not on file     Transportation needs:     Medical: Not on file     Non-medical: Not on file   Tobacco Use     Smoking status: Former Smoker     Smokeless tobacco: Never Used   Substance and Sexual Activity     Alcohol use: Not on file     Drug use: Not on file     Sexual activity: Not on file   Lifestyle     Physical activity:     Days per week: Not on file     Minutes per session: Not on file     Stress: Not on file   Relationships     Social connections:     Talks on phone: Not on file     Gets together: Not on file     Attends Worship service: Not on file     Active member of club or organization: Not on file     Attends meetings of clubs or organizations: Not on file     Relationship status: Not on file     Intimate partner violence:     Fear of current or ex partner: Not on file     Emotionally abused: Not on file     Physically abused: Not on file     Forced sexual activity: Not on file   Other Topics Concern     Not on file   Social History Narrative     Not on file        Current Meds    Current Outpatient Medications:      hydrochlorothiazide (HYDRODIURIL) 25 MG tablet, Take 1 tablet (25 mg) by mouth daily, Disp: 90 tablet, Rfl: 3     IBUPROFEN PO, , Disp: , Rfl:      metFORMIN (GLUCOPHAGE-XR) 500 MG 24 hr tablet, Take 1 tablet (500 mg) by mouth daily (with dinner), Disp: 90 tablet, Rfl: 3        OBJECTIVE     Vital signs reviewed by Quang Garsia  /73 (BP Location: Left arm, Patient Position: Sitting, Cuff Size: Adult Regular)   Pulse 65   Temp 98.2  F (36.8  C) (Oral)   Resp 18   Ht 1.689 m (5' 6.5\")   Wt 66.6 kg (146 lb 12.8 oz)   SpO2 99%   BMI 23.34 kg/m       Physical Exam   Constitutional: He is oriented to person, place, and time. " He appears well-developed and well-nourished. He is cooperative.  Non-toxic appearance. He does not have a sickly appearance. He does not appear ill. No distress.   HENT:   Head: Normocephalic and atraumatic.   Right Ear: Hearing, tympanic membrane, external ear and ear canal normal. Tympanic membrane is not perforated, not erythematous, not retracted and not bulging.   Left Ear: Hearing, tympanic membrane, external ear and ear canal normal. Tympanic membrane is not perforated, not erythematous, not retracted and not bulging.   Nose: Mucosal edema and rhinorrhea present. Right sinus exhibits no maxillary sinus tenderness and no frontal sinus tenderness. Left sinus exhibits no maxillary sinus tenderness and no frontal sinus tenderness.   Mouth/Throat: Mucous membranes are normal. Posterior oropharyngeal erythema present. No oropharyngeal exudate or posterior oropharyngeal edema. Tonsils are 0 on the right. Tonsils are 0 on the left. No tonsillar exudate.   Eyes: Pupils are equal, round, and reactive to light. Conjunctivae, EOM and lids are normal. Right eye exhibits no discharge and no exudate. Left eye exhibits no discharge and no exudate. Right conjunctiva is not injected. Left conjunctiva is not injected.   Neck: Normal range of motion. Neck supple.   Cardiovascular: Normal rate, regular rhythm, normal heart sounds and intact distal pulses. Exam reveals no gallop and no friction rub.   No murmur heard.  Pulmonary/Chest: Effort normal and breath sounds normal. No stridor. No respiratory distress. He has no decreased breath sounds. He has no wheezes. He has no rhonchi. He has no rales. He exhibits no tenderness.   Abdominal: Soft. Bowel sounds are normal. He exhibits no distension and no mass. There is no tenderness. There is no rigidity, no rebound, no guarding and no CVA tenderness.   Musculoskeletal: Normal range of motion.   Neurological: He is alert and oriented to person, place, and time. He displays normal  reflexes. No cranial nerve deficit or sensory deficit. He exhibits normal muscle tone. Coordination normal.   Skin: Skin is warm. Capillary refill takes less than 2 seconds. He is not diaphoretic.   Psychiatric: He has a normal mood and affect. His behavior is normal. Judgment and thought content normal.         Labs:     Results for orders placed or performed in visit on 05/20/19   Strep, Rapid Screen   Result Value Ref Range    Specimen Description Throat     Rapid Strep A Screen       NEGATIVE: No Group A streptococcal antigen detected by immunoassay, await culture report.       Medical Decision Making:    Differential Diagnosis:  URI Adult/Peds:  Bronchitis-viral, Influenza, Pneumonia, Strep pharyngitis, Tonsilitis, Viral pharyngitis, Viral syndrome and Viral upper respiratory illness        ASSESSMENT    1. Throat pain    2. Acute bronchitis with symptoms > 10 days    3. Acute frontal sinusitis, recurrence not specified        PLAN    Patient presents with 10 days of dry cough, nasal congestion and sore throat.  Patient is in no acute distress.  Temp is 98.2 in clinic today.  Lung sounds were clear and O2 sats at 99% on RA.  Imaging to rule out pneumonia is not indicated at this time.  RST was negative.  We will call with culture results only if positive.  Rest, Push fluids, vaporizer, elevation of head of bed.  Ibuprofen and or Tylenol for any fever or body aches.  Over the counter cough suppressant- PRN- as discussed.   If symptoms worsen, recheck immediately otherwise follow up with your PCP in 1 week if symptoms are not improving.  Worrisome symptoms discussed with instructions to go to the ED.  Patient verbalized understanding and agreed with this plan.         Quang Garsia  5/20/2019, 3:36 PM

## 2019-05-21 LAB
BACTERIA SPEC CULT: NORMAL
SPECIMEN SOURCE: NORMAL

## 2019-05-29 ENCOUNTER — OFFICE VISIT (OUTPATIENT)
Dept: DERMATOLOGY | Facility: CLINIC | Age: 44
End: 2019-05-29
Payer: COMMERCIAL

## 2019-05-29 DIAGNOSIS — L82.1 DERMATOSIS PAPULOSA NIGRA: Primary | ICD-10-CM

## 2019-05-29 PROCEDURE — 99202 OFFICE O/P NEW SF 15 MIN: CPT | Performed by: DERMATOLOGY

## 2019-05-29 PROCEDURE — 99207 ZZC NO CHARGE-COSMETIC BENIGN DESTRUCTION OF LESIONS: CPT | Performed by: DERMATOLOGY

## 2019-05-29 ASSESSMENT — PAIN SCALES - GENERAL: PAINLEVEL: NO PAIN (0)

## 2019-05-29 NOTE — PROGRESS NOTES
[facility] Dermatology Note      Dermatology Problem List:  1.Dermatosis papulosa nigra  - will consider cosmetic treatment with hyfrecation  - test spot (x3) done on right temple 5/29/2019    Encounter Date: May 29, 2019    CC:  Chief Complaint   Patient presents with     Derm Problem     Micheal is visiting to discuss areas on the face       History of Present Illness:  Mr. Micheal Husain is a 43 year old male who presents in self referral for lesions on the face. He has black dots that started appearing a few years ago. He has gotten more scattered since then. He has noticed them in some of his older family members, but not at his age. He feels they age him. He does not like the appearance of the spots. No family or personal history of skin cancer. No other concerns addressed today.      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     Family History:  No family history of skin cancer.     Medications:  Current Outpatient Medications   Medication Sig Dispense Refill     hydrochlorothiazide (HYDRODIURIL) 25 MG tablet Take 1 tablet (25 mg) by mouth daily 90 tablet 3     metFORMIN (GLUCOPHAGE-XR) 500 MG 24 hr tablet Take 1 tablet (500 mg) by mouth daily (with dinner) 90 tablet 3     IBUPROFEN PO          No Known Allergies    Review of Systems:  -Constitutional: Patient is otherwise feeling well, in usual state of health.   -Skin: As above in HPI. No additional skin concerns.    Physical exam:  Vitals: There were no vitals taken for this visit.  GEN: This is a well developed, well-nourished male in no acute distress, in a pleasant mood.    SKIN: Sun-exposed skin, which includes the head/face, neck, both arms, digits, and/or  nails was examined.   - Huntley Type V  - There are scattered small  waxy stuck on tan to brown papules on the bilateral cheeks and temples consistent with DPN.  - No other lesions of concern on areas examined.         Impression/Plan:    1. Dermatosis papulosa nigra. Discussed that the best line of treatment for these lesions for the patient's skin type is with hyfrecation. Discussed the risk of temporary/permanent hyperpigmentation and hypopigmentation. Will do a test spot today, if patient likes results can do more for cosmetic fee in the future.     Test spots x 3 on the right temple. Face was cleansed with alcohol. Hyfrecator was used at a 3 setting to electrodesiccate lesions. Vaseline was applied over top of wounds. Patient left the clinic in good condition.     Patient reassured of the benign nature of these lesions.    Photodocumentation included in the chart. Also took photo on patient's phone so he can determine if he likes cosmetic result.     Patient will call for cosmetic visit if he wishes to treat. today was a test spot, no charge. Future charge will be $150 per 14 spots. Will treat only 14 spots at one time. Should be scheduled as my last appt of the day as these are quite time consuming to treat.     Follow-up prn.       Staff Involved:  Scribe/Staff    Scribe Disclosure  I, Swetha Don, am serving as a scribe to document services personally performed by Dr. Elizabeth Geronimo MD, based on data collection and the provider's statements to me.     Provider Disclosure:   The documentation recorded by the scribe accurately reflects the services I personally performed and the decisions made by me.    Elizabeth Geronimo MD    Department of Dermatology  [facility]: Phone: [phone], Fax:[phone]  [facility]: Phone: [phone], Fax: [phone]

## 2019-05-29 NOTE — LETTER
5/29/2019         RE: Micheal Husain  [address] [facility] [address]        Dear Colleague,    Thank you for referring your patient, Micheal Husain, to the [facility]. Please see a copy of my visit note below.    [facility] Dermatology Note      Dermatology Problem List:  1.Dermatosis papulosa nigra  - will consider cosmetic treatment with hyfrecation  - test spot (x3) done on right temple 5/29/2019    Encounter Date: May 29, 2019    CC:  Chief Complaint   Patient presents with     Derm Problem     Micheal is visiting to discuss areas on the face       History of Present Illness:  Mr. Micheal Husain is a 43 year old male who presents in self referral for lesions on the face. He has black dots that started appearing a few years ago. He has gotten more scattered since then. He has noticed them in some of his older family members, but not at his age. He feels they age him. He does not like the appearance of the spots. No family or personal history of skin cancer. No other concerns addressed today.      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     Family History:  No family history of skin cancer.     Medications:  Current Outpatient Medications   Medication Sig Dispense Refill     hydrochlorothiazide (HYDRODIURIL) 25 MG tablet Take 1 tablet (25 mg) by mouth daily 90 tablet 3     metFORMIN (GLUCOPHAGE-XR) 500 MG 24 hr tablet Take 1 tablet (500 mg) by mouth daily (with dinner) 90 tablet 3     IBUPROFEN PO          No Known Allergies    Review of Systems:  -Constitutional: Patient is otherwise feeling well, in usual state of health.   -Skin: As above in HPI. No additional skin  concerns.    Physical exam:  Vitals: There were no vitals taken for this visit.  GEN: This is a well developed, well-nourished male in no acute distress, in a pleasant mood.    SKIN: Sun-exposed skin, which includes the head/face, neck, both arms, digits, and/or nails was examined.   - Huntley Type V  - There are scattered small  waxy stuck on tan to brown papules on the bilateral cheeks and temples consistent with DPN.  - No other lesions of concern on areas examined.         Impression/Plan:    1. Dermatosis papulosa nigra. Discussed that the best line of treatment for these lesions for the patient's skin type is with hyfrecation. Discussed the risk of temporary/permanent hyperpigmentation and hypopigmentation. Will do a test spot today, if patient likes results can do more for cosmetic fee in the future.     Test spots x 3 on the right temple. Face was cleansed with alcohol. Hyfrecator was used at a 3 setting to electrodesiccate lesions. Vaseline was applied over top of wounds. Patient left the clinic in good condition.     Patient reassured of the benign nature of these lesions.    Photodocumentation included in the chart. Also took photo on patient's phone so he can determine if he likes cosmetic result.     Patient will call for cosmetic visit if he wishes to treat. today was a test spot, no charge. Future charge will be $150 per 14 spots. Will treat only 14 spots at one time. Should be scheduled as my last appt of the day as these are quite time consuming to treat.     Follow-up prn.       Staff Involved:  Scribe/Staff    Scribe Disclosure  I, Swetha Don, am serving as a scribe to document services personally performed by Dr. Elizabeth Geronimo MD, based on data collection and the provider's statements to me.     Provider Disclosure:   The documentation recorded by the scribe accurately reflects the services I personally performed and the decisions made by me.    Elizabeth Geronimo MD  Assistant  Professor  Department of Dermatology  [facility]: Phone: [phone], Fax:[phone]  [facility]: Phone: [phone], Fax: [phone]              Again, thank you for allowing me to participate in the care of your patient.        Sincerely,        Elizabeth Geronimo MD

## 2019-06-25 ENCOUNTER — TELEPHONE (OUTPATIENT)
Dept: DERMATOLOGY | Facility: CLINIC | Age: 44
End: 2019-06-25

## 2019-06-25 NOTE — TELEPHONE ENCOUNTER
[facility] Call Center    Phone Message    May a detailed message be left on voicemail: yes    Reason for Call: Other: Pt needs to reschedule upcoming cosmetic procedure appt. Please call pt back to reschedule.     Action Taken: Message routed to:  Adult Clinics: Dermatology p [phone]

## 2019-07-02 ENCOUNTER — OFFICE VISIT (OUTPATIENT)
Dept: DERMATOLOGY | Facility: CLINIC | Age: 44
End: 2019-07-02

## 2019-07-02 DIAGNOSIS — L82.1 DERMATOSIS PAPULOSA NIGRA: Primary | ICD-10-CM

## 2019-07-02 PROCEDURE — 96999 UNLISTED SPEC DERM SVC/PX: CPT | Performed by: DERMATOLOGY

## 2019-07-02 ASSESSMENT — PAIN SCALES - GENERAL: PAINLEVEL: NO PAIN (0)

## 2019-07-02 NOTE — NURSING NOTE
Micheal Husain's goals for this visit include:   Chief Complaint   Patient presents with     Derm Problem     benign destruction with hyfrecator       He requests these members of his care team be copied on today's visit information: No    PCP: No Ref-Primary, Physician    Referring Provider:  No referring provider defined for this encounter.    There were no vitals taken for this visit.    Do you need any medication refills at today's visit? No    Tessie Israel LPN on 7/2/2019 at 4:29 PM

## 2019-07-02 NOTE — LETTER
7/2/2019         RE: Micheal Husain  [address] [facility] [address]        Dear Colleague,    Thank you for referring your patient, Micheal Husain, to the [facility]. Please see a copy of my visit note below.    See procedure note.     Again, thank you for allowing me to participate in the care of your patient.        Sincerely,        Elizabeth Geronimo MD

## 2019-07-02 NOTE — PROCEDURES
Cosmetic encounter    Dermatosis papulosa nigra.     Patient returns today for a cosmetic treatment of cosmetically bothersome lesions on the face. Discussed the risks and benefits of the procedure. He will pay 150$ today for 14 spots.    Patient identified most bothersome spots. These were marked with white marker. The area was cleaned with isopropyl alcohol. ~2ml of 1% lidocaine with 1:100,000 epinephrine was injected to obtain adequate anesthesia of each lesion.  Face was cleansed with alcohol. Hyfrecator was used at a 5 setting to electrodesiccate lesions. Vaseline was applied over top of wounds. Patient left the clinic in good condition. He will start hydroquinone 4% cream in one week.                               Staff involved:  Scribe/Staff    Scribe Disclosure  I, Swetha Don, am serving as a scribe to document services personally performed by Dr. Elizabeth Geronimo MD, based on data collection and the provider's statements to me.     Provider Disclosure:   The documentation recorded by the scribe accurately reflects the services I personally performed and the decisions made by me.    Elizabeth Geronimo MD    Department of Dermatology  [facility]: Phone: [phone], Fax:[phone]  [facility]: Phone: [phone], Fax: [phone]

## 2019-07-22 ENCOUNTER — OFFICE VISIT (OUTPATIENT)
Dept: FAMILY MEDICINE | Facility: CLINIC | Age: 44
End: 2019-07-22
Payer: COMMERCIAL

## 2019-07-22 VITALS — DIASTOLIC BLOOD PRESSURE: 82 MMHG | TEMPERATURE: 98.6 F | SYSTOLIC BLOOD PRESSURE: 127 MMHG

## 2019-07-22 DIAGNOSIS — Z71.84 TRAVEL ADVICE ENCOUNTER: Primary | ICD-10-CM

## 2019-07-22 PROCEDURE — 90471 IMMUNIZATION ADMIN: CPT | Performed by: NURSE PRACTITIONER

## 2019-07-22 PROCEDURE — 90472 IMMUNIZATION ADMIN EACH ADD: CPT | Performed by: NURSE PRACTITIONER

## 2019-07-22 PROCEDURE — 90632 HEPA VACCINE ADULT IM: CPT | Performed by: NURSE PRACTITIONER

## 2019-07-22 PROCEDURE — 90691 TYPHOID VACCINE IM: CPT | Performed by: NURSE PRACTITIONER

## 2019-07-22 PROCEDURE — 99401 PREV MED CNSL INDIV APPRX 15: CPT | Mod: 25 | Performed by: NURSE PRACTITIONER

## 2019-07-22 RX ORDER — AZITHROMYCIN 500 MG/1
500 TABLET, FILM COATED ORAL DAILY
Qty: 3 TABLET | Refills: 0 | Status: SHIPPED | OUTPATIENT
Start: 2019-07-22 | End: 2019-09-06

## 2019-07-22 NOTE — PATIENT INSTRUCTIONS
Today July 22, 2019 you received the    Hepatitis A Vaccine - Please return on 1/18/20 or later for your 2nd and final dose.    Typhoid - injectable. This vaccine is valid for two years.   .    These appointments can be made as a NURSE ONLY visit.    **It is very important for the vaccinations to be given on the scheduled day(s), this helps ensure you receive the full effectiveness of the vaccine.**    Please call [facility] with any questions [phone]    Thank you for visiting [facility]  '

## 2019-07-22 NOTE — NURSING NOTE
"Chief Complaint   Patient presents with     Travel Clinic     initial /82 (BP Location: Right arm, Cuff Size: Adult Regular)   Temp 98.6  F (37  C) (Oral)  Estimated body mass index is 23.34 kg/m  as calculated from the following:    Height as of 5/20/19: 1.689 m (5' 6.5\").    Weight as of 5/20/19: 66.6 kg (146 lb 12.8 oz).  BP completed using cuff size: regular.  R arm      Health Maintenance that is potentially due pending provider review:  NONE    n/a    Olegario Valle ma  "

## 2019-07-22 NOTE — PROGRESS NOTES
Nurse Note      Itinerary:  [place]      Departure Date: 7/24/19      Return Date: 8/17/19      Length of Trip 2 1/2 weeks      Reason for Travel: Tourism           Urban or rural: both      Accommodations: Family home        IMMUNIZATION HISTORY  Have you received any immunizations within the past 4 weeks?  No  Have you ever fainted from having your blood drawn or from an injection?  No  Have you ever had a fever reaction to vaccination?  No  Have you ever had any bad reaction or side effect from any vaccination?  No  Have you ever had hepatitis A or B vaccine?  No  Do you live (or work closely) with anyone who has AIDS, an AIDS-like condition, any other immune disorder or who is on chemotherapy for cancer?  No  Do you have a family history of immunodeficiency?  No  Have you received any injection of immune globulin or any blood products during the past 12 months?  No    Patient roomed by Olegario Aaron  Micheal Husain is a 43 year old male seen today with spouse  with children for counsultation for international travel to [facility] for Visiting friends and relatives.  Patient will be departing in  2 day(s) and staying for   2.5 week(s) and  traveling with family member(s).      Patient itinerary :  will be in the urban region of  [facility] which presents risk for Dengue Fever and food borne illnesses. exposure.      Patient's activities will include visiting friends and relatives.    Patient's country of birth is [place]    Special medical concerns: none  Pre-travel questionnaire was completed by patient and reviewed by provider.     Vitals: /82 (BP Location: Right arm, Cuff Size: Adult Regular)   Temp 98.6  F (37  C) (Oral)   BMI= There is no height or weight on file to calculate BMI.    EXAM:  General:  Well-nourished, well-developed in no acute distress.  Appears to be stated age, interacts appropriately and expresses understanding of information given to patient.    Current  Outpatient Medications   Medication Sig Dispense Refill     hydrochlorothiazide (HYDRODIURIL) 25 MG tablet Take 1 tablet (25 mg) by mouth daily 90 tablet 3     IBUPROFEN PO        metFORMIN (GLUCOPHAGE-XR) 500 MG 24 hr tablet Take 1 tablet (500 mg) by mouth daily (with dinner) 90 tablet 3     Patient Active Problem List   Diagnosis     Family history of ischemic heart disease     Mixed hyperlipidemia     Prediabetes     HTN, goal below 140/90     No Known Allergies      Immunizations discussed include:   Hepatitis A:  Ordered/given today, risks, benefits and side effects reviewed  Hepatitis B: Insufficient time to vaccinate and status unknown , declines titers  Influenza: vaccine is not available  Typhoid: Ordered/given today, risks, benefits and side effects reviewed  Rabies: Declined  reviewed managment of a animal bite or scratch (washing wound, seek medical care within 24 hours for post exposure prophylaxis ) and Insufficient time to vaccinate  Yellow Fever: Not indicated  Japanese Encephalitis: Not indicated - risk of disease is minimal urban  Meningococcus: Not indicated  Tetanus/Diphtheria: Up to date  Measles/Mumps/Rubella: Up to date  Cholera: Not needed  Polio: Up to date  Pneumococcal: Under age of 65  Varicella: Immune by disease history per patient report  Zostavax:  Not indicated  Shingrix: Not indicated  HPV:  Not indicated  TB:  Low risk     Altitude Exposure on this trip: no  Past tolerance to Altitude: na    ASSESSMENT/PLAN:    ICD-10-CM    1. Travel advice encounter Z71.89 azithromycin (ZITHROMAX) 500 MG tablet     I have reviewed general recommendations for safe travel   including: food/water precautions, insect precautions, safer sex   practices given high prevalence of Zika, HIV and other STDs,   roadway safety. Educational materials and [employer] report provided.    Malaraia prophylaxis recommended: na  Symptomatic treatment for traveler's diarrhea: azithromycin  Altitude illness prevention and  treatment: na      Evacuation insurance advised and resources were provided to patient.    Total visit time 20 minutes  with over 50% of time spent counseling patient as detailed above.    Sherie Hassan CNP

## 2019-09-06 ENCOUNTER — OFFICE VISIT (OUTPATIENT)
Dept: FAMILY MEDICINE | Facility: CLINIC | Age: 44
End: 2019-09-06
Payer: COMMERCIAL

## 2019-09-06 VITALS
HEIGHT: 67 IN | DIASTOLIC BLOOD PRESSURE: 72 MMHG | SYSTOLIC BLOOD PRESSURE: 110 MMHG | WEIGHT: 152 LBS | HEART RATE: 63 BPM | TEMPERATURE: 97.7 F | BODY MASS INDEX: 23.86 KG/M2 | OXYGEN SATURATION: 99 %

## 2019-09-06 DIAGNOSIS — E78.2 MIXED HYPERLIPIDEMIA: ICD-10-CM

## 2019-09-06 DIAGNOSIS — I10 HTN, GOAL BELOW 140/90: Primary | ICD-10-CM

## 2019-09-06 DIAGNOSIS — R73.03 PREDIABETES: ICD-10-CM

## 2019-09-06 LAB
CHOLEST SERPL-MCNC: 247 MG/DL
HBA1C MFR BLD: 5.6 % (ref 0–5.6)
HDLC SERPL-MCNC: 39 MG/DL
LDLC SERPL CALC-MCNC: 152 MG/DL
NONHDLC SERPL-MCNC: 208 MG/DL
TRIGL SERPL-MCNC: 278 MG/DL

## 2019-09-06 PROCEDURE — 36415 COLL VENOUS BLD VENIPUNCTURE: CPT | Performed by: PREVENTIVE MEDICINE

## 2019-09-06 PROCEDURE — 83036 HEMOGLOBIN GLYCOSYLATED A1C: CPT | Performed by: PREVENTIVE MEDICINE

## 2019-09-06 PROCEDURE — 99214 OFFICE O/P EST MOD 30 MIN: CPT | Performed by: PREVENTIVE MEDICINE

## 2019-09-06 PROCEDURE — 80061 LIPID PANEL: CPT | Performed by: PREVENTIVE MEDICINE

## 2019-09-06 RX ORDER — ATORVASTATIN CALCIUM 20 MG/1
20 TABLET, FILM COATED ORAL DAILY
Qty: 90 TABLET | Refills: 1 | Status: SHIPPED | OUTPATIENT
Start: 2019-09-06 | End: 2019-12-02

## 2019-09-06 ASSESSMENT — MIFFLIN-ST. JEOR: SCORE: 1530.16

## 2019-09-06 NOTE — RESULT ENCOUNTER NOTE
Please send a letter:    Dear Micheal Husain,    Cholesterol is improved from last check however, I have sent in a cholesterol medication for you.  Take this medication at night.  Three month glucose value is improved from last check and now normal.  Please let me know if you have any questions and thank you for choosing [facility].    Regards,    Angelina Sharma MD MPH

## 2019-09-06 NOTE — PATIENT INSTRUCTIONS
At [facility], we strive to deliver an exceptional experience to you, every time we see you.  If you receive a survey in the mail, please send us back your thoughts. We really do value your feedback.    Your care team:                            Family Medicine Internal Medicine   MD Dani Phelan MD Shantel Branch-Fleming, MD Katya Georgiev PA-C Megan Hill, APRN RICKEY Vincent MD Pediatrics   Flavio Turpin, LUANA Campbell, MD Wendy Jernigan APRN CNP   MD Liz Meraz MD Deborah Mielke, MD Lizeth Finnegan, APRN [facility]      Clinic hours: Monday - Thursday 7 am-7 pm; Fridays 7 am-5 pm.   Urgent care: Monday - Friday 11 am-9 pm; Saturday and Sunday 9 am-5 pm.  Pharmacy : Monday -Thursday 8 am-8 pm; Friday 8 am-6 pm; Saturday and Sunday 9 am-5 pm.     Clinic: [phone]   Pharmacy: [phone]

## 2019-09-06 NOTE — PROGRESS NOTES
Subjective     Micheal Husain is a 44 year old male who presents to clinic today for the following health issues:    HPI   Hyperlipidemia Follow-Up      Are you having any of the following symptoms? (Select all that apply)  Shortness of breath, Left-sided neck or arm pain and Chest pain or pressure    Are you regularly taking any medication or supplement to lower your cholesterol?   No    Are you having muscle aches or other side effects that you think could be caused by your cholesterol lowering medication?  No    How many servings of fruits and vegetables do you eat daily?  2-3    On average, how many sweetened beverages do you drink each day (soda, juice, sweet tea, etc)?   0    How many days per week do you miss taking your medication? Not taking medication for cholesterol    Some exercise 2-3 times a week, 30 minutes    Hypertension Follow-up      Do you check your blood pressure regularly outside of the clinic? Yes     Are you following a low salt diet? No    Are your blood pressures ever more than 140 on the top number (systolic) OR more   than 90 on the bottom number (diastolic), for example 140/90? No     Tobacco use:  -once a week  -has cut down     Patient Active Problem List   Diagnosis     Family history of ischemic heart disease     Mixed hyperlipidemia     Prediabetes     HTN, goal below 140/90     History reviewed. No pertinent surgical history.    Social History     Tobacco Use     Smoking status: Former Smoker     Smokeless tobacco: Never Used   Substance Use Topics     Alcohol use: Not on file     Family History   Problem Relation Age of Onset     Cerebrovascular Disease Father          Current Outpatient Medications   Medication Sig Dispense Refill     hydrochlorothiazide (HYDRODIURIL) 25 MG tablet Take 1 tablet (25 mg) by mouth daily 90 tablet 3     IBUPROFEN PO        metFORMIN (GLUCOPHAGE-XR) 500 MG 24 hr tablet Take 1 tablet (500 mg) by mouth daily (with dinner) 90 tablet 3     No Known  "Allergies  Recent Labs   Lab Test 04/02/19  0743 03/02/18  0728   A1C 6.0* 5.7   LDL Cannot estimate LDL when triglyceride exceeds 400 mg/dL  139* 71   HDL 38* 43   TRIG 456* 278*   ALT  --  37   CR 0.83 0.88   GFRESTIMATED >90 >90   GFRESTBLACK >90 >90   POTASSIUM 3.9 4.0   TSH  --  1.76      BP Readings from Last 3 Encounters:   09/06/19 110/72   07/22/19 127/82   05/20/19 131/73    Wt Readings from Last 3 Encounters:   09/06/19 68.9 kg (152 lb)   05/20/19 66.6 kg (146 lb 12.8 oz)   03/29/19 66.7 kg (147 lb)            Reviewed and updated as needed this visit by Provider  Tobacco  Allergies  Meds  Problems  Med Hx  Surg Hx  Fam Hx         Review of Systems   ROS COMP: Constitutional, HEENT, cardiovascular, pulmonary, gi and gu systems are negative, except as otherwise noted.      Objective    /72   Pulse 63   Temp 97.7  F (36.5  C) (Oral)   Ht 1.689 m (5' 6.5\")   Wt 68.9 kg (152 lb)   SpO2 99%   BMI 24.17 kg/m    Body mass index is 24.17 kg/m .  Physical Exam   GENERAL APPEARANCE: healthy, alert and no distress  EYES: Eyes grossly normal to inspection and conjunctivae and sclerae normal  HENT: nose and mouth without ulcers or lesions  NECK: no adenopathy and trachea midline and normal to palpation  RESP: lungs clear to auscultation - no rales, rhonchi or wheezes  CV: regular rates and rhythm, normal S1 S2, no S3 or S4 and no murmur, click or rub  ABDOMEN: soft, non-tender and no rebound or guarding   MS: extremities normal- no gross deformities noted and peripheral pulses normal  SKIN: no suspicious lesions or rashes  NEURO: Normal strength and tone, mentation intact and speech normal  PSYCH: mentation appears normal      Diagnostic Test Results:  Labs reviewed in Epic  No results found for this or any previous visit (from the past 24 hour(s)).        Assessment & Plan     Micheal was seen today for diabetes, lipids and blood draw.    Diagnoses and all orders for this visit:    HTN, goal below " 140/90  -at goal  -continue current medication     Mixed hyperlipidemia  -     Lipid panel reflex to direct LDL Fasting  -had been on Lipitor in the past     Prediabetes  -     Hemoglobin A1c  -last HbA1C was 6  -continue Metformin   -Await labs            Work on weight loss  Regular exercise    Return in about 1 year (around 9/6/2020) for Routine Visit, Lab Work.    Angelina Sharma MD MPH    [facility]

## 2019-09-23 ENCOUNTER — TELEPHONE (OUTPATIENT)
Dept: CARDIOLOGY | Facility: CLINIC | Age: 44
End: 2019-09-23

## 2019-09-23 NOTE — TELEPHONE ENCOUNTER
Received message back from patient in [employer] regarding pre visit questions. See response below--      *Have you ever seen a cardiologist? No          *Do you have any cardiac records outside of the [facility] system? Records in [facility] per patient.

## 2019-09-23 NOTE — TELEPHONE ENCOUNTER
Left message for pt to return call.    Dr. Raphael on 9/27      *Have you ever seen a cardiologist?   *If so, who did you see?   *Where did you see them?   *When did you see them?    *Do you have any cardiac records outside of the [facility] system?      Veronica Elizabeth CMA on 9/23/2019 at 10:26 AM

## 2019-09-27 ENCOUNTER — OFFICE VISIT (OUTPATIENT)
Dept: CARDIOLOGY | Facility: CLINIC | Age: 44
End: 2019-09-27
Payer: COMMERCIAL

## 2019-09-27 VITALS
SYSTOLIC BLOOD PRESSURE: 136 MMHG | DIASTOLIC BLOOD PRESSURE: 87 MMHG | WEIGHT: 148.4 LBS | HEART RATE: 79 BPM | BODY MASS INDEX: 23.29 KG/M2 | OXYGEN SATURATION: 99 % | HEIGHT: 67 IN

## 2019-09-27 DIAGNOSIS — Z82.49 FAMILY HISTORY OF ISCHEMIC HEART DISEASE: Primary | ICD-10-CM

## 2019-09-27 DIAGNOSIS — I20.9 ANGINA PECTORIS (H): ICD-10-CM

## 2019-09-27 DIAGNOSIS — E78.2 MIXED HYPERLIPIDEMIA: ICD-10-CM

## 2019-09-27 PROCEDURE — 99204 OFFICE O/P NEW MOD 45 MIN: CPT | Performed by: INTERNAL MEDICINE

## 2019-09-27 ASSESSMENT — PAIN SCALES - GENERAL: PAINLEVEL: NO PAIN (0)

## 2019-09-27 ASSESSMENT — MIFFLIN-ST. JEOR: SCORE: 1513.83

## 2019-09-27 NOTE — PROGRESS NOTES
"[facility] Cardiology Consultation:    Assessment and Plan:     1. Atypical chest pain, high risk individual with multiple CAD risk factors: will check coronary CTA  2. Hypertension: Controlled on meds  3. Dyslipidemia: On lipitor, check FLP in 1 month.   4. Diabetes: On metformin    Bronson Raphael MD    Cardiac Imaging and Prevention  [facility]  [email or web address] Pager: [phone]    HPI: His past medical history is significant for mixed hyperlipidemia, hypertension, diabetes, and strong family history of non-premature CAD.  He had a stress echocardiogram done approximately 2 years ago that was normal, with no structural heart disease and no ischemia.  He was treated with Lipitor a few years ago with good response, however it was stopped for unclear reasons.  Recent lipid profile shows a total cholesterol of 247, HDL cholesterol of 39, calculated LDL cholesterol of 152, and triglycerides of 278.  Recent hemoglobin A1c is 5.6.  Renal function is normal.  He takes Lipitor 20 mg, hydrochlorothiazide 25 mg, and metformin 500 mg.  He reports occasional chest pain.  This is most common at rest, that is sometimes accompanied with palpitations, and he feels like he has to squeeze his chest for some relief.  Occasionally, he also gets chest discomfort on exertion.  He has not been very active, and does not exercise regularly.  His brother recently had bypass surgery, and he is in his 50s.  His father also has CAD.    EXAM:  /87   Pulse 79   Ht 1.689 m (5' 6.5\")   Wt 67.3 kg (148 lb 6.4 oz)   SpO2 99%   BMI 23.59 kg/m    GEN/CONSTITUIONAL: Appears comfortable, in no apparent distress   EYES: No icterus  ENT/MOUTH: Normal  JVP:  Not visible  RESPIRATORY: Clear to auscultation bilaterally   CARDIOVASCULAR: Regular S1 and S2, no murmurs, rubs, or gallops.   ABDOMEN: Soft, non-tender, positive bowel sounds   NEUROLOGIC: Grossly non-focal   PSYCHIATRIC: Normal " affect  EXT: No cyanosis, clubbing, edema. Normal pedal pulses.  Skin: No petechiae, purpura or rash    PAST MEDICAL HISTORY:  Past Medical History:   Diagnosis Date     Hyperlipidemia      Hypertension      Pre-diabetes        CURRENT MEDICATIONS:  Current Outpatient Medications   Medication     atorvastatin (LIPITOR) 20 MG tablet     hydrochlorothiazide (HYDRODIURIL) 25 MG tablet     IBUPROFEN PO     metFORMIN (GLUCOPHAGE-XR) 500 MG 24 hr tablet     No current facility-administered medications for this visit.        PAST SURGICAL HISTORY:  No past surgical history on file.    ALLERGIES   No Known Allergies    FAMILY HISTORY:  Family History   Problem Relation Age of Onset     Cerebrovascular Disease Father        SOCIAL HISTORY:  Social History     Socioeconomic History     Marital status:      Spouse name: Not on file     Number of children: Not on file     Years of education: Not on file     Highest education level: Not on file   Occupational History     Not on file   Social Needs     Financial resource strain: Not on file     Food insecurity:     Worry: Not on file     Inability: Not on file     Transportation needs:     Medical: Not on file     Non-medical: Not on file   Tobacco Use     Smoking status: Former Smoker     Smokeless tobacco: Never Used   Substance and Sexual Activity     Alcohol use: Not on file     Drug use: Not on file     Sexual activity: Not on file   Lifestyle     Physical activity:     Days per week: Not on file     Minutes per session: Not on file     Stress: Not on file   Relationships     Social connections:     Talks on phone: Not on file     Gets together: Not on file     Attends Sikh service: Not on file     Active member of club or organization: Not on file     Attends meetings of clubs or organizations: Not on file     Relationship status: Not on file     Intimate partner violence:     Fear of current or ex partner: Not on file     Emotionally abused: Not on file      Physically abused: Not on file     Forced sexual activity: Not on file   Other Topics Concern     Not on file   Social History Narrative     Not on file       ROS:   Constitutional: No fever, chills, or sweats. No weight gain/loss   ENT: No visual disturbance, ear ache, epistaxis, sore throat  Allergies/Immunologic: Negative.   Respiratory: No cough, hemoptysia  Cardiovascular: As per HPI  GI: No nausea, vomiting, hematemesis, melena, or hematochezia  : No urinary frequency, dysuria, or hematuria  Integument: Negative  Psychiatric: Negative  Neuro: Negative  Endocrinology: Negative   Musculoskeletal: Negative    ADDITIONAL COMMENTS:     I reviewed the patient's medications:     I reviewed the patient's pertinent clinical laboratory studies:     I reviewed the patient's pertinent imaging studies:   I reviewed the patient's ECG:       Stress echo 2018 Interpretation Summary     Conclusion:  Negative exercise echocardiogram at a diagnostic level of peak stress (92%  MPHR).     Patient developed no chest pain.  Test stopped due to target achieved.  Normal functional capacity.  No regional wall motion abnormalities at rest or with stress.  Normal resting LV function with EF of approximately 60-65%; normal response to  exercise with increase to approximately 70-75%.  Hypertensive blood pressure response to exercise.  No ECG evidence of ischemia.  No significant valvular disease noted on routine screening color flow Doppler  and pulsed Doppler examination.  The ascending aortic segment is normal.

## 2019-09-27 NOTE — PATIENT INSTRUCTIONS
The following is a summary of your office visit:    Medications started today:NA    Medications stopped today:NA    Medication dose change: NA    Nurse contact information: Marianne Riggs RN  Cardiology Care Coordinator  [phone] Phone  [phone] Fax    Appointments made today: CTA at the , Lab 1 week    Patient instructions: Follow up to be determined      If you have had any blood work, imaging or other testing completed we will be in touch within 1-2 weeks regarding the results. If you have any questions, concerns or need to schedule a follow up, please contact us at [phone]. If you are needing refills please contact your pharmacy. For urgent after hour care please call the [facility] at [phone] or the [facility] at [phone] and ask to speak to the cardiologist on call.    It was a pleasure meeting with you today. Please let us know if there is anything else we can do for you so that we can be sure you are leaving completely satisfied with your care experience.     Your Cardiology Team at [facility]  RN Care Coordinator: Marianne RIDDLE: Veronica

## 2019-09-27 NOTE — NURSING NOTE
"Micheal Husain's goals for this visit include: return  He requests these members of his care team be copied on today's visit information:     PCP: No Ref-Primary, Physician    Referring Provider:  No referring provider defined for this encounter.    /87   Pulse 79   Ht 1.689 m (5' 6.5\")   Wt 67.3 kg (148 lb 6.4 oz)   SpO2 99%   BMI 23.59 kg/m      Do you need any medication refills at today's visit? n  "

## 2019-09-30 ENCOUNTER — HOSPITAL ENCOUNTER (OUTPATIENT)
Dept: CT IMAGING | Facility: CLINIC | Age: 44
Discharge: HOME | End: 2019-09-30
Attending: INTERNAL MEDICINE | Admitting: INTERNAL MEDICINE
Payer: COMMERCIAL

## 2019-09-30 VITALS — SYSTOLIC BLOOD PRESSURE: 113 MMHG | DIASTOLIC BLOOD PRESSURE: 74 MMHG | RESPIRATION RATE: 16 BRPM

## 2019-09-30 DIAGNOSIS — Z82.49 FAMILY HISTORY OF ISCHEMIC HEART DISEASE: ICD-10-CM

## 2019-09-30 DIAGNOSIS — I20.9 ANGINA PECTORIS (H): ICD-10-CM

## 2019-09-30 PROCEDURE — 0503T CT FFR: CPT

## 2019-09-30 PROCEDURE — 0504T: CPT | Mod: GC | Performed by: INTERNAL MEDICINE

## 2019-09-30 PROCEDURE — 75574 CT ANGIO HRT W/3D IMAGE: CPT | Mod: 26 | Performed by: INTERNAL MEDICINE

## 2019-09-30 PROCEDURE — 75574 CT ANGIO HRT W/3D IMAGE: CPT

## 2019-09-30 PROCEDURE — 25000128 H RX IP 250 OP 636: Performed by: INTERNAL MEDICINE

## 2019-09-30 PROCEDURE — 25000132 ZZH RX MED GY IP 250 OP 250 PS 637: Performed by: INTERNAL MEDICINE

## 2019-09-30 RX ORDER — IOPAMIDOL 755 MG/ML
120 INJECTION, SOLUTION INTRAVASCULAR ONCE
Status: COMPLETED | OUTPATIENT
Start: 2019-09-30 | End: 2019-09-30

## 2019-09-30 RX ORDER — NITROGLYCERIN 0.4 MG/1
.4-.8 TABLET SUBLINGUAL
Status: DISCONTINUED | OUTPATIENT
Start: 2019-09-30 | End: 2019-10-01 | Stop reason: HOSPADM

## 2019-09-30 RX ORDER — METOPROLOL TARTRATE 1 MG/ML
5-15 INJECTION, SOLUTION INTRAVENOUS
Status: DISCONTINUED | OUTPATIENT
Start: 2019-09-30 | End: 2019-10-01 | Stop reason: HOSPADM

## 2019-09-30 RX ORDER — ACYCLOVIR 200 MG/1
0-1 CAPSULE ORAL
Status: DISCONTINUED | OUTPATIENT
Start: 2019-09-30 | End: 2019-10-01 | Stop reason: HOSPADM

## 2019-09-30 RX ORDER — METOPROLOL TARTRATE 50 MG
50-100 TABLET ORAL
Status: COMPLETED | OUTPATIENT
Start: 2019-09-30 | End: 2019-09-30

## 2019-09-30 RX ADMIN — NITROGLYCERIN 0.8 MG: 0.4 TABLET SUBLINGUAL at 09:34

## 2019-09-30 RX ADMIN — METOPROLOL TARTRATE 50 MG: 50 TABLET ORAL at 08:24

## 2019-09-30 RX ADMIN — IOPAMIDOL 120 ML: 755 INJECTION, SOLUTION INTRAVENOUS at 09:26

## 2019-10-01 DIAGNOSIS — I25.10 CORONARY ARTERY DISEASE INVOLVING NATIVE CORONARY ARTERY OF NATIVE HEART WITHOUT ANGINA PECTORIS: Primary | ICD-10-CM

## 2019-10-01 RX ORDER — NITROGLYCERIN 0.4 MG/1
TABLET SUBLINGUAL
Qty: 25 TABLET | Refills: 3 | Status: SHIPPED | OUTPATIENT
Start: 2019-10-01 | End: 2020-09-25

## 2019-10-01 RX ORDER — METOPROLOL SUCCINATE 25 MG/1
25 TABLET, EXTENDED RELEASE ORAL DAILY
Qty: 90 TABLET | Refills: 3 | Status: SHIPPED | OUTPATIENT
Start: 2019-10-01 | End: 2019-12-10 | Stop reason: SINTOL

## 2019-10-01 NOTE — PROGRESS NOTES
Coronary CTA results discussed. Severe mid circumflex stenosis, with moderate LAD stenosis that is hemodynamically insignificant by CT-FFR analysis. Given mild angina, we decided to try medical therapy first.   Rx sent for ASA 81 mg, Toprol 25 mg, and s/l NTG prn.   RTC 4-6 weeks.

## 2019-10-03 ENCOUNTER — TELEPHONE (OUTPATIENT)
Dept: CARDIOLOGY | Facility: CLINIC | Age: 44
End: 2019-10-03

## 2019-10-03 NOTE — TELEPHONE ENCOUNTER
Left pt message to call clinic    Pt needs to schedule appt with Dr. Raphael.  Pt is due around the beginning of November.     Pt has an appt for labs scheduled for 11/7.    Veronica Elizabeth CMA on 10/3/2019 at 3:16 PM

## 2019-10-03 NOTE — TELEPHONE ENCOUNTER
----- Message from Marianne Riggs RN sent at 10/2/2019  9:13 AM CDT -----  Regarding: Please call and schedule appt with Prab in 4-6 weeks, f/u CAD

## 2019-10-04 NOTE — TELEPHONE ENCOUNTER
Left pt message to call clinic    Pt needs to schedule appt with Dr. Raphael.  Pt is due around the beginning of November.     Pt has an appt for labs scheduled for 11/7.       Veronica Elizabeth CMA on 10/4/2019 at 2:55 PM

## 2019-10-09 NOTE — TELEPHONE ENCOUNTER
Left pt a message. Advised he has appt for lab set up for 11/7 but no follow up with Dr. Raphael. Gave him phone number to return call to set up appt with Dr. Raphael.     Veronica Elizabeth CMA on 10/9/2019 at 11:48 AM

## 2019-10-31 ENCOUNTER — TELEPHONE (OUTPATIENT)
Dept: CARDIOLOGY | Facility: CLINIC | Age: 44
End: 2019-10-31

## 2019-10-31 NOTE — TELEPHONE ENCOUNTER
Received fax from [facility] cardiac rehab department requesting an order. States patient had a SATISH on 10/23/19. There is nothing in his chart regarding this. Called and left a message asking him to call back.    CAREN Vogel

## 2019-11-04 NOTE — TELEPHONE ENCOUNTER
Sudha from [facility] called about the order. She states that the PCP would not sign off on the order because patient is following with [facility]. The SATISH was put in at the [facility]. Sudha is wanting an order from [facility] for therapy at the [facility]

## 2019-11-04 NOTE — TELEPHONE ENCOUNTER
Bronson Raphael MD  Cc: Marianne Riggs RN P [facility] Cardiology Adult [place]   Caller: Unspecified (4 days ago,  4:28 PM)             Does he want to follow up with us or [facility]? I am ok with signing, but he should at least contact us and want to follow with us.

## 2019-11-04 NOTE — TELEPHONE ENCOUNTER
Called and spoke to cardiac rehab. Advised that we know nothing about the intervention, and the patient has not returned call. Able to access records in Care Everywhere no that we know where he had procedure. Will ask Dr Raphael for recommendations.   CAREN Vogel    INTERVENTIONAL CARDIOLOGY DISCHARGE SUMMARY    DATE OF ADMISSION: 10/23/2019  DATE OF DISCHARGE: 10/23/19    Discharge Provider: Júnior Linares M.D., Ph.D.   Discharge Provider Team: [facility] CVD Interventional/Cath    DISMISSAL DIAGNOSES  #1 Coronary Artery Disease With Stable Angina (HCC)  #2 Hypertension And Chronic Kidney Disease Stage 1  #3 Hyperlipidemia On Treatment  #4 Family History Coronary Artery Disease  #5 Coronary Stent Status Post    Mr. Husain underwent percutaneous intervention with drug-eluting stent placement to the distal circumflex coronary artery x 2 via right femoral approach by Dr. Linares on 10/23/2019. Aspirin therapy recommended indefinitely. Clopidogrel/Plavix therapy recommended for 12 months. Metoprolol and hydrochlorothiazide continued, atorvastatin increased. Metformin held with a plan to resume on 10/26/2019.    Post-procedurally, Mr. Husain has done very well. Denies chest pain or shortness of breath. Right femoral site stable. Ambulated. Will discharge home with family.

## 2019-11-05 NOTE — TELEPHONE ENCOUNTER
Called and spoke to patient. He was unsure of who he is going to follow with for his cardiac care. He said he would call [facility] and let cardiac rehab know what he decides. Spoke with [facility] cardiac rehab and informed them also.   CAREN Vogel

## 2019-11-15 ENCOUNTER — TRANSFERRED RECORDS (OUTPATIENT)
Dept: HEALTH INFORMATION MANAGEMENT | Facility: CLINIC | Age: 44
End: 2019-11-15

## 2019-11-18 ENCOUNTER — TRANSFERRED RECORDS (OUTPATIENT)
Dept: HEALTH INFORMATION MANAGEMENT | Facility: CLINIC | Age: 44
End: 2019-11-18

## 2019-11-30 NOTE — PROGRESS NOTES
[facility]    Referring Provider:  No ref. provider found  Primary Care Provider:   No Ref-Primary, Physician  Indication for Consultation:  CAD    HPI: Micheal Husain is a 44 year old male being seen today for evaluation of CAD.   The patient's risk factor profile is: (+) HTN, Type II DM, (+) hypercholesterolemia, (+)  prior  Social tobacco use [quit Oct 2019], (+) fam Hx premature CAD [paternal, fraternal].    The patient had a negative stress echo (3/19/2018). CT angiography performed 10/3/19 demonstrated a coronary calcium score of 146 with calcium noted in the LAD and circumflex. LAD FFR-CT distal to proximal-mid LAD stenosis was 0.89. There was noted to be severe stenosis in the distal left circumflex.  The patient had coronary angiography (10/23/19) at [facility] and underwent PCI of a  in the distal LCx.  There was minimal disease in the RCA and 50% narrowing in the mid LAD.   He presented to [facility] ER with chest discomfort, TWI in III and aVF, no ST shift, no Q waves, and underwent repeat coronary angiography (11/15/19).   The stents in the distal LCx were widely patent.  There was an 80% narrowing in a small caliber OM1 and disease in the distal segment of the Rt PDA, neither of which were amenable to PCI.  He was continued on medical therapy.    It has been two weeks since his last coronary angiogram.  He has not had recurrent chest discomfort, dyspnea, PND, orthopnea, pedal edema, palpitations, lightheadedness, and syncope.  He has not had to use any additional SL NTG.    The patient has no history of CHF, arrhythmia, or valvular heart disease.  The patient has no Hx of PAD or cerebrovascular disease.     The patient was scheduled to start cardiac rehabilitation but has not yet done so.    PAST MEDICAL HISTORY:  Past Medical History:   Diagnosis Date     Hyperlipidemia      Hypertension      Pre-diabetes        CURRENT MEDICATIONS:  Current Outpatient Medications    Medication Sig     ALPRAZolam (XANAX) 0.25 MG tablet Take 0.25 mg by mouth nightly as needed      aspirin (ASA) 81 MG tablet Take 1 tablet (81 mg) by mouth daily     atorvastatin (LIPITOR) 40 MG tablet Take 20 mg by mouth     clopidogrel (PLAVIX) 75 MG tablet Take 75 mg by mouth     hydrochlorothiazide (HYDRODIURIL) 25 MG tablet Take 1 tablet (25 mg) by mouth daily     IBUPROFEN PO      melatonin 3 MG tablet Take 3 mg by mouth nightly as needed      metFORMIN (GLUCOPHAGE-XR) 500 MG 24 hr tablet Take 1 tablet (500 mg) by mouth daily (with dinner)     metoprolol succinate ER (TOPROL-XL) 25 MG 24 hr tablet Take 1 tablet (25 mg) by mouth daily     nitroGLYcerin (NITROSTAT) 0.4 MG sublingual tablet For chest pain place 1 tablet under the tongue every 5 minutes for 3 doses. If symptoms persist 5 minutes after 1st dose call 911.     polyethylene glycol (MIRALAX/GLYCOLAX) packet TAKE 1 PKT BY MOUTH DAILY UNTIL BOWEL MOVEMENT DISSOLVE EACH DOSE  MLS (8 OUNCES) OF BEVERAGE.     temazepam (RESTORIL) 15 MG capsule Take 15 mg by mouth nightly as needed      isosorbide mononitrate (IMDUR) 30 MG 24 hr tablet Take 30 mg by mouth     No current facility-administered medications for this visit.        PAST SURGICAL HISTORY:  No past surgical history on file.    ALLERGIES  Patient has no known allergies.    FAMILY HX:  Family History   Problem Relation Age of Onset     Cerebrovascular Disease Father        SOCIAL HX:  Social History     Socioeconomic History     Marital status:      Spouse name: Not on file     Number of children: Not on file     Years of education: Not on file     Highest education level: Not on file   Occupational History     Not on file   Social Needs     Financial resource strain: Not on file     Food insecurity:     Worry: Not on file     Inability: Not on file     Transportation needs:     Medical: Not on file     Non-medical: Not on file   Tobacco Use     Smoking status: Former Smoker      "Smokeless tobacco: Never Used   Substance and Sexual Activity     Alcohol use: Not on file     Drug use: Not on file     Sexual activity: Not on file   Lifestyle     Physical activity:     Days per week: Not on file     Minutes per session: Not on file     Stress: Not on file   Relationships     Social connections:     Talks on phone: Not on file     Gets together: Not on file     Attends Islam service: Not on file     Active member of club or organization: Not on file     Attends meetings of clubs or organizations: Not on file     Relationship status: Not on file     Intimate partner violence:     Fear of current or ex partner: Not on file     Emotionally abused: Not on file     Physically abused: Not on file     Forced sexual activity: Not on file   Other Topics Concern     Not on file   Social History Narrative     Not on file       ROS:  Constitutional: No fever, chills, or sweats. No weight gain/loss.   HEENT: No visual disturbance, ear ache, epistaxis, sore throat.   Allergies/Immunologic: Negative.   Respiratory: No cough, hemoptysis.   Cardiovascular: As per HPI.   GI: No nausea, vomiting, hematemesis, melena, or hematochezia.   : No urinary frequency, dysuria, or hematuria.   Integument: No rash.   Psychiatric: No anxiety / depression.   Neuro: No speech disturbance, focal sensory or motor deficit.   Endocrinology: No polyuria / polyphagia.   Musculoskeletal: No myalgia.    VITAL SIGNS:  /70 (BP Location: Left arm, Patient Position: Sitting, Cuff Size: Adult Regular)   Pulse 72   Ht 1.689 m (5' 6.5\")   Wt 67.8 kg (149 lb 8 oz)   BMI 23.77 kg/m    Body mass index is 23.77 kg/m .  Wt Readings from Last 2 Encounters:   09/27/19 67.3 kg (148 lb 6.4 oz)   09/06/19 68.9 kg (152 lb)       PHYSICAL EXAM  Micheal Husain is a 44 year old male.in no acute distress.  HEENT: Eyes Nonicteric.  Neck: JVP 2cm H20.  Carotids +2 bilaterally without bruits.  Lungs: CTA.  Cor: RRR. Normal S1 and S2.  No " murmur, rub, or gallop.  PMI in Lf 5th ICS.  Abd: Soft, nontender, nondistended.  NABS.  No pulsatile mass.  Extremities: No C/C/E.  Pulses +3/3 symmetric in upper and lower extremities.  Neuro: Grossly intact.  Psych: A&O x 3.  Skin: No rash.    LABS  Recent Labs   Lab Test 18  0728   WBC 6.6   HGB 13.5   HCT 41.3        Recent Labs   Lab Test 19  0743 18  0728    137   POTASSIUM 3.9 4.0   CHLORIDE 97 101   CO2 28 27   GLC 95 93   BUN 14 12   CR 0.83 0.88   JULITA 9.2 9.0     Recent Labs   Lab Test 19  0756 19  0743   CHOL 247* 243*   HDL 39* 38*   * Cannot estimate LDL when triglyceride exceeds 400 mg/dL  139*   TRIG 278* 456*   NHDL 208* 205*        EK19  Normal ECG.    EK/15/19  [[facility]]  NSR,  No Q waves.  No ST shift.      ECHO: None    CARDIAC MRI: None    CORONARY CTA: 19  Single vessel coronary artery disease with severe stenosis within distal LCX     FINDINGS:  1.  2 vessel coronary disease with intermediate stenosis within proximal-mid LAD and severe stenosis of the distal LCX.   2.  LAD: FFR-CT distal to proximal-mid LAD stenosis is 0.89 or non-significant.   3.  LCX: Severe stenosis in distal LCX and FFR-CT not performed.   4.  RCA: No stenosis >30%; FFR-CT analysis was not performed.     DOMINANCE: Right dominant system.   Normal coronary origins and course.     LEFT MAIN:   The left main arises normally from the left coronary cusp and is  widely patent without any detectable stenosis.      LEFT ANTERIOR DESCENDING:   There is a mixed calcified and non-calcified plaque in the proximal-mid LAD with moderate stenosis (40-60%). The rest of the LAD and diagonal system is widely patent without any detectable stenosis.      LEFT CIRCUMFLEX:   The circumflex is the non-dominant artery which gives rise to two large OM branches. Distal to the takeoff of the 2nd OM branch, there is a severe obstruction (non-calcified plaque)  with sub-total occlusion of the circumflex. Small OM1 with small proximal plaque with mild 30-50% stenosis proximally. The larger OM2 branch is free of disease.     RIGHT CORONARY ARTERY:   The right coronary artery and its major branches are widely patent without any detectable atherosclerosis or stenosis.     ADDITIONAL FINDINGS:   The proximal ascending aorta is normal in size.   Normal pulmonary venous anatomy with all four pulmonary veins draining  into the left atrium.    The left atrial appendage is free of thrombus.  There is no left ventricular mass or thrombus.   Normal pericardial thickness. There is no pericardial effusion.    EXERCISE STRESS ECHO:  3/9/18  Conclusion:  Negative exercise echocardiogram at a diagnostic level of peak stress (92% MPHR).     Patient developed no chest pain.  Test stopped due to target achieved.  Normal functional capacity.  No regional wall motion abnormalities at rest or with stress.  Normal resting LV function with EF of approximately 60-65%; normal response to exercise with increase to approximately 70-75%.  Hypertensive blood pressure response to exercise.  No ECG evidence of ischemia.  No significant valvular disease noted on routine screening color flow Doppler  and pulsed Doppler examination.  The ascending aortic segment is normal.    CARDIAC CATH:  10/23/19  FINAL DIAGNOSIS  1.   (Chronic Total Occlusion) of distal LCx  2.  Successful PTCA and drug-eluting stent to distal circumflex  with 2.75x12 and 2.5x12 mm Synergy  3.  Severe coronary artery atherosclerosis    CORONARY DIAGNOSTIC SUMMARY  Coronary artery dominance is right.   The left main coronary artery is 10% obstructed by a discrete lesion.   The middle left anterior descending artery is 50% obstructed by a tubular lesion.   The distal circumflex artery is 100% obstructed by a discrete lesion and 70% obstructed by a discrete lesion.   The first obtuse marginal is 70% obstructed by a discrete lesion.    The proximal right coronary artery is 10% obstructed by a discrete lesion.   CORONARY INTERVENTION SUMMARY  Successful intervention of the Distal Circumflex Artery. The preintervention stenosis was 100%. The post intervention stenosis was 0%. Devices used include Stent and PTCA.     CARDIAC CATH: 11/15/2019  [[facility]]  LMCA: ---  LAD: 40% mid  LCx: Previously placed stents widely patent  OM1: Small caliber, 80%, too small to intervene upon  RCA: Disease in small caliber distal Rt PDA    ASSESSMENT AND PLAN:   1. CAD, Asymptomatic.  Mr. Husain has single vessel CAD with patent stents in the distal LCx and an 80% stenosis in a small OM1 as well as disease in the distal Rt PDA [small caliber] and a 40% mid LAD. Asymptomatic.  CV exam unremarkable. ECG today has reverted to normal (resolution of inferior TWI).  He has normal LV function by the most recent stress test.  He had hyperdynamic LV function at the time of his last ECHO (March 2018).  He is on ASA, Plavix, beta blocker, and a statin. He will start cardiac rehabilitation.  He has no intention of resuming tobacco.   Plavix may be stopped in Oct 2020.  He does not require Imdur but can take SL NTG as needed.    2. HTN.  BP controlled on hydrochlorothiazide 25 every day and Toprol XL 25 mg every day.    3. Hypercholesterolemia.  Patient recently increased from Lipitor 20 to 40 mg every day.  I suspect he will need higher dose given the elevation of LDL.  Recheck lipids in one month.    4. Type II Diabetes.  Continue Metformin.    5. Tobacco cessation.  Patient has no intention of resuming tobacco use.  I spent 5 minutes discussing this topic with him.    FOLLOW UP:  1 year    I spent 45 minutes reviewing prior records, interviewing and examining the patient.    Recent Labs   Lab Test 01/07/20  0721 09/06/19  0756   CHOL 154 247*   HDL 41 39*   LDL 79 152*   TRIG 169* 278*     ADDENDUM (1/7/19): Continue Lipitor at 40 mg every day.    Yayo Workman,  MD    Divisions of Cardiology  [place], [place]    CC  Patient Care Team:  No Ref-Primary, Physician as PCP - General  Angelina Sharma MD as Assigned PCP

## 2019-12-02 ENCOUNTER — OFFICE VISIT (OUTPATIENT)
Dept: CARDIOLOGY | Facility: CLINIC | Age: 44
End: 2019-12-02
Payer: COMMERCIAL

## 2019-12-02 ENCOUNTER — TELEPHONE (OUTPATIENT)
Dept: CARDIOLOGY | Facility: CLINIC | Age: 44
End: 2019-12-02

## 2019-12-02 VITALS
WEIGHT: 149.5 LBS | SYSTOLIC BLOOD PRESSURE: 136 MMHG | BODY MASS INDEX: 23.46 KG/M2 | DIASTOLIC BLOOD PRESSURE: 70 MMHG | HEIGHT: 67 IN | HEART RATE: 72 BPM

## 2019-12-02 DIAGNOSIS — I25.10 CORONARY ARTERY DISEASE INVOLVING NATIVE CORONARY ARTERY OF NATIVE HEART WITHOUT ANGINA PECTORIS: ICD-10-CM

## 2019-12-02 DIAGNOSIS — E78.2 MIXED HYPERLIPIDEMIA: Primary | ICD-10-CM

## 2019-12-02 PROCEDURE — 93005 ELECTROCARDIOGRAM TRACING: CPT | Performed by: INTERNAL MEDICINE

## 2019-12-02 PROCEDURE — 99214 OFFICE O/P EST MOD 30 MIN: CPT | Performed by: INTERNAL MEDICINE

## 2019-12-02 RX ORDER — TEMAZEPAM 15 MG/1
15 CAPSULE ORAL
COMMUNITY
Start: 2019-11-18 | End: 2020-01-30

## 2019-12-02 RX ORDER — ISOSORBIDE MONONITRATE 30 MG/1
30 TABLET, EXTENDED RELEASE ORAL
COMMUNITY
Start: 2019-11-15 | End: 2019-12-02

## 2019-12-02 RX ORDER — ALPRAZOLAM 0.25 MG
0.25 TABLET ORAL
COMMUNITY
Start: 2019-11-18 | End: 2020-01-30

## 2019-12-02 RX ORDER — ATORVASTATIN CALCIUM 40 MG/1
20 TABLET, FILM COATED ORAL
COMMUNITY
Start: 2019-10-23 | End: 2020-01-03

## 2019-12-02 RX ORDER — POLYETHYLENE GLYCOL 3350 17 G/17G
POWDER, FOR SOLUTION ORAL
COMMUNITY
Start: 2019-10-24 | End: 2020-01-30

## 2019-12-02 RX ORDER — LANOLIN ALCOHOL/MO/W.PET/CERES
3 CREAM (GRAM) TOPICAL
COMMUNITY
End: 2020-01-30

## 2019-12-02 RX ORDER — CLOPIDOGREL BISULFATE 75 MG/1
75 TABLET ORAL DAILY
COMMUNITY
Start: 2019-10-23 | End: 2020-10-19

## 2019-12-02 ASSESSMENT — PAIN SCALES - GENERAL: PAINLEVEL: NO PAIN (0)

## 2019-12-02 ASSESSMENT — MIFFLIN-ST. JEOR: SCORE: 1518.82

## 2019-12-02 NOTE — TELEPHONE ENCOUNTER
"Patient calling. He saw Dr Workman this morning and Dr Workman told him the metoprolol may \"cause sexual difficulties\". He was told that he could have an alternate medication. He has thought about this and decided he does have problems.   Will route to Dr Jacinta Vogel RN    Date: 12/2/2019    Time of Call: 3:05 PM     Diagnosis:  CAD     [ VORB ] Ordering provider: Dr Yayo Workman  Order: May hold the Metoprolol for one week to see if his ED resolves.      Order received by: CAREN Vogel     Follow-up/additional notes: Asked him to call in one week with status report.         "

## 2019-12-02 NOTE — TELEPHONE ENCOUNTER
Health Call Center    Phone Message    May a detailed message be left on voicemail: yes    Reason for Call: Patient has questions regarding the side effects of metoprolol succinate ER (TOPROL-XL) 25 MG 24 hr tablet [[id number]] (Order [id number]). Please call back to discuss.     Action Taken: Message routed to:  Adult Clinics: Cardiology p [phone]

## 2019-12-02 NOTE — PATIENT INSTRUCTIONS
It was a pleasure to see you in the cardiology clinic today.    If you have any questions, you can reach my nurse, Marianne Riggs, at [phone].     Note the new medications: None    Stop the following medications:     Imdur (Isosorbide mononitrate)    The results from today include: None    [phone]    Tests ordered today: Lipids in one month    I would like you to follow up with Dr. Workman in one year.    Sincerely,      Yayo Workman MD     [facility]

## 2019-12-02 NOTE — NURSING NOTE
"Micheal Husain's goals for this visit include:   Chief Complaint   Patient presents with     Consult     establishing care, CAD       He requests these members of his care team be copied on today's visit information: no    PCP: No Ref-Primary, Physician    Referring Provider:  No referring provider defined for this encounter.    /70 (BP Location: Left arm, Patient Position: Sitting, Cuff Size: Adult Regular)   Pulse 72   Ht 1.689 m (5' 6.5\")   Wt 67.8 kg (149 lb 8 oz)   BMI 23.77 kg/m      Do you need any medication refills at today's visit? no  .  "

## 2019-12-10 ENCOUNTER — TELEPHONE (OUTPATIENT)
Dept: CARDIOLOGY | Facility: CLINIC | Age: 44
End: 2019-12-10

## 2019-12-10 NOTE — TELEPHONE ENCOUNTER
Patient calling to say that stopping the Metoprolol for one week resolved his ED. Advised that I would send message to Dr Workman and call him back.     CAREN Vogel, Yayo Eric MD  Cc: Marianne Riggs RN P [facility] Cardiology Adult [place]   Caller: Unspecified (Today,  9:20 AM)             Marianne,     I reviewed records.  Patient had PCI outside of our system ([facility], Oct 2019) and he was discharged on the BB for stable angina post PCI.  If his BP is well controlled < 130 / 90 mm Hg, I would not start another agent.  Otherwise, I would try alternative BB, such as Atenolol 25 mg every day.      Called and spoke to patient and given above message from Dr Workman. He will check his BP at home at least three times a week and call if they are greater than goal. Metoprolol taken off of med list and listed as allergy    CAREN Vogel

## 2019-12-27 ENCOUNTER — TELEPHONE (OUTPATIENT)
Dept: NURSING | Facility: CLINIC | Age: 44
End: 2019-12-27

## 2019-12-27 DIAGNOSIS — I10 HTN, GOAL BELOW 140/90: Primary | ICD-10-CM

## 2019-12-27 RX ORDER — ATENOLOL 25 MG/1
25 TABLET ORAL DAILY
Qty: 30 TABLET | Refills: 3 | Status: SHIPPED | OUTPATIENT
Start: 2019-12-27 | End: 2020-03-11

## 2019-12-27 NOTE — TELEPHONE ENCOUNTER
Called and spoke with patient. He is aware of BP's running high. Reviewed Dr Workman's note and patient will start on Atenolol. Left message with cardiac rehab to call back about new medication and ask that they call him for an appointment when to return. He was also given names of our PCP downstairs.     CAREN Vogel

## 2019-12-27 NOTE — TELEPHONE ENCOUNTER
Call from Rachael at [facility] cardiac rehab.  Micheal has been to cardiac rehab x 3.  1st date 12/17/19   resting /84, BP with lower level exercise 170/72  2nd date 12/23/19  Resting /64, BP with lower level exercise 190/68  3rd date today, 12/27/19  Resting /84, BP with lower level exercise 184/70  Resting heart rate low 70's, peak heart rate 124  Does become dizzy coming off the machine, feels this is from [place] not cooling down properly, and not staying hydrated  No other symptoms.  Cannot progress in cardiac rehab due to elevated BP readings with lower level exercise.      Recently metoprolol discontinued, note from 12/10:      Patient had PCI outside of our system ([facility], Oct 2019) and he was discharged on the BB for stable angina post PCI.  If his BP is well controlled < 130 / 90 mm Hg, I would not start another agent.  Otherwise, I would try alternative BB, such as Atenolol 25 mg every day.   Best number to reach Micheal

## 2020-01-03 DIAGNOSIS — E78.2 MIXED HYPERLIPIDEMIA: Primary | ICD-10-CM

## 2020-01-03 RX ORDER — ATORVASTATIN CALCIUM 40 MG/1
40 TABLET, FILM COATED ORAL DAILY
Qty: 90 TABLET | Refills: 3 | Status: SHIPPED | OUTPATIENT
Start: 2020-01-03 | End: 2020-10-19

## 2020-01-03 NOTE — TELEPHONE ENCOUNTER
"Requested Prescriptions   Pending Prescriptions Disp Refills     atorvastatin (LIPITOR) 40 MG tablet 90 tablet 3     Sig: Take 1 tablet (40 mg) by mouth daily       Statins Protocol Passed - 1/3/2020  1:12 PM        Passed - LDL on file in past 12 months     Recent Labs   Lab Test 09/06/19  0756   *             Passed - No abnormal creatine kinase in past 12 months     No lab results found.             Passed - Recent (12 mo) or future (30 days) visit within the authorizing provider's specialty     Patient has had an office visit with the authorizing provider or a provider within the authorizing providers department within the previous 12 mos or has a future within next 30 days. See \"Patient Info\" tab in inbasket, or \"Choose Columns\" in Meds & Orders section of the refill encounter.              Passed - Medication is active on med list        Passed - Patient is age 18 or older        "

## 2020-01-07 DIAGNOSIS — E78.2 MIXED HYPERLIPIDEMIA: ICD-10-CM

## 2020-01-07 LAB
CHOLEST SERPL-MCNC: 154 MG/DL
HDLC SERPL-MCNC: 41 MG/DL
LDLC SERPL CALC-MCNC: 79 MG/DL
NONHDLC SERPL-MCNC: 113 MG/DL
TRIGL SERPL-MCNC: 169 MG/DL

## 2020-01-07 PROCEDURE — 36415 COLL VENOUS BLD VENIPUNCTURE: CPT | Performed by: INTERNAL MEDICINE

## 2020-01-07 PROCEDURE — 80061 LIPID PANEL: CPT | Performed by: INTERNAL MEDICINE

## 2020-01-30 ENCOUNTER — OFFICE VISIT (OUTPATIENT)
Dept: FAMILY MEDICINE | Facility: CLINIC | Age: 45
End: 2020-01-30
Payer: COMMERCIAL

## 2020-01-30 VITALS
DIASTOLIC BLOOD PRESSURE: 73 MMHG | SYSTOLIC BLOOD PRESSURE: 119 MMHG | RESPIRATION RATE: 18 BRPM | WEIGHT: 149.6 LBS | HEIGHT: 67 IN | TEMPERATURE: 98.3 F | HEART RATE: 64 BPM | OXYGEN SATURATION: 99 % | BODY MASS INDEX: 23.48 KG/M2

## 2020-01-30 DIAGNOSIS — E78.5 HYPERLIPIDEMIA LDL GOAL <70: ICD-10-CM

## 2020-01-30 DIAGNOSIS — Z00.00 ROUTINE GENERAL MEDICAL EXAMINATION AT A HEALTH CARE FACILITY: Primary | ICD-10-CM

## 2020-01-30 DIAGNOSIS — I10 ESSENTIAL HYPERTENSION WITH GOAL BLOOD PRESSURE LESS THAN 140/90: ICD-10-CM

## 2020-01-30 DIAGNOSIS — I25.10 CORONARY ARTERY DISEASE INVOLVING NATIVE CORONARY ARTERY OF NATIVE HEART WITHOUT ANGINA PECTORIS: ICD-10-CM

## 2020-01-30 DIAGNOSIS — R73.03 PREDIABETES: ICD-10-CM

## 2020-01-30 PROBLEM — F17.200 TOBACCO USE DISORDER: Status: RESOLVED | Noted: 2020-01-30 | Resolved: 2020-01-30

## 2020-01-30 PROBLEM — F17.200 TOBACCO USE DISORDER: Status: ACTIVE | Noted: 2020-01-30

## 2020-01-30 LAB
ERYTHROCYTE [DISTWIDTH] IN BLOOD BY AUTOMATED COUNT: 13.5 % (ref 10–15)
HBA1C MFR BLD: 5.8 % (ref 0–5.6)
HCT VFR BLD AUTO: 41.1 % (ref 40–53)
HGB BLD-MCNC: 13.6 G/DL (ref 13.3–17.7)
MCH RBC QN AUTO: 27.7 PG (ref 26.5–33)
MCHC RBC AUTO-ENTMCNC: 33.1 G/DL (ref 31.5–36.5)
MCV RBC AUTO: 84 FL (ref 78–100)
PLATELET # BLD AUTO: 294 10E9/L (ref 150–450)
RBC # BLD AUTO: 4.91 10E12/L (ref 4.4–5.9)
WBC # BLD AUTO: 8.4 10E9/L (ref 4–11)

## 2020-01-30 PROCEDURE — 99214 OFFICE O/P EST MOD 30 MIN: CPT | Mod: 25 | Performed by: FAMILY MEDICINE

## 2020-01-30 PROCEDURE — 85027 COMPLETE CBC AUTOMATED: CPT | Performed by: FAMILY MEDICINE

## 2020-01-30 PROCEDURE — 83036 HEMOGLOBIN GLYCOSYLATED A1C: CPT | Performed by: FAMILY MEDICINE

## 2020-01-30 PROCEDURE — 84443 ASSAY THYROID STIM HORMONE: CPT | Performed by: FAMILY MEDICINE

## 2020-01-30 PROCEDURE — 36415 COLL VENOUS BLD VENIPUNCTURE: CPT | Performed by: FAMILY MEDICINE

## 2020-01-30 PROCEDURE — 99396 PREV VISIT EST AGE 40-64: CPT | Performed by: FAMILY MEDICINE

## 2020-01-30 PROCEDURE — 80053 COMPREHEN METABOLIC PANEL: CPT | Performed by: FAMILY MEDICINE

## 2020-01-30 SDOH — HEALTH STABILITY: MENTAL HEALTH: HOW OFTEN DO YOU HAVE A DRINK CONTAINING ALCOHOL?: NEVER

## 2020-01-30 ASSESSMENT — PAIN SCALES - GENERAL: PAINLEVEL: NO PAIN (0)

## 2020-01-30 ASSESSMENT — MIFFLIN-ST. JEOR: SCORE: 1519.27

## 2020-01-31 LAB
ALBUMIN SERPL-MCNC: 4.7 G/DL (ref 3.4–5)
ALP SERPL-CCNC: 75 U/L (ref 40–150)
ALT SERPL W P-5'-P-CCNC: 36 U/L (ref 0–70)
ANION GAP SERPL CALCULATED.3IONS-SCNC: 8 MMOL/L (ref 3–14)
AST SERPL W P-5'-P-CCNC: 27 U/L (ref 0–45)
BILIRUB SERPL-MCNC: 0.4 MG/DL (ref 0.2–1.3)
BUN SERPL-MCNC: 9 MG/DL (ref 7–30)
CALCIUM SERPL-MCNC: 9.1 MG/DL (ref 8.5–10.1)
CHLORIDE SERPL-SCNC: 94 MMOL/L (ref 94–109)
CO2 SERPL-SCNC: 30 MMOL/L (ref 20–32)
CREAT SERPL-MCNC: 0.72 MG/DL (ref 0.66–1.25)
GFR SERPL CREATININE-BSD FRML MDRD: >90 ML/MIN/{1.73_M2}
GLUCOSE SERPL-MCNC: 86 MG/DL (ref 70–99)
POTASSIUM SERPL-SCNC: 4 MMOL/L (ref 3.4–5.3)
PROT SERPL-MCNC: 7.9 G/DL (ref 6.8–8.8)
SODIUM SERPL-SCNC: 132 MMOL/L (ref 133–144)
TSH SERPL DL<=0.005 MIU/L-ACNC: 2.05 MU/L (ref 0.4–4)

## 2020-01-31 NOTE — PROGRESS NOTES
SUBJECTIVE:   CC: Micheal Husain is an 44 year old male who presents for preventive health visit.     Healthy Habits:    Do you get at least three servings of calcium containing foods daily (dairy, green leafy vegetables, etc.)? yes    Amount of exercise or daily activities, outside of work: 5 day(s) per week    Problems taking medications regularly No    Medication side effects: No    Have you had an eye exam in the past two years? yes    Do you see a dentist twice per year? yes    Do you have sleep apnea, excessive snoring or daytime drowsiness?no      Today's PHQ-2 Score:   PHQ-2 ( 1999 [employer]) 1/30/2020 3/29/2019   Q1: Little interest or pleasure in doing things 0 0   Q2: Feeling down, depressed or hopeless 0 0   PHQ-2 Score 0 0       Abuse: Current or Past(Physical, Sexual or Emotional)- No  Do you feel safe in your environment? Yes        Social History     Tobacco Use     Smoking status: Former Smoker     Smokeless tobacco: Never Used   Substance Use Topics     Alcohol use: Never     Frequency: Never     If you drink alcohol do you typically have >3 drinks per day or >7 drinks per week? No         Diabetes  His blood sugar is low before and after he eats. His dietitian wants to know if he can stop Metformin. He feels very tired and bitter throughout the day when he takes Metformin. He feels lightheaded sometimes, so he's wondering if it's Metformin. He sometimes has sleeping problems, so he's thinking that may be the cause.    Additional Information               He has been having some heart troubles, and he had 2 stents put in in October. He was told he needed to visit a family practice doctor. He has one artery that is 80% blocked and another is 50%.    Past medical, family, and social histories, medications, and allergies are reviewed and updated in [facility].    ROS:  CONSTITUTIONAL: NEGATIVE for fever, chills, change in weight  INTEGUMENTARY/SKIN: NEGATIVE for worrisome rashes, moles or lesions  EYES:  "NEGATIVE for vision changes or irritation  ENT: NEGATIVE for ear, mouth and throat problems  RESP: NEGATIVE for significant cough or SOB  CV: NEGATIVE for chest pain, palpitations or peripheral edema  GI: NEGATIVE for nausea, abdominal pain, heartburn, or change in bowel habits   male: negative for dysuria, hematuria, decreased urinary stream, erectile dysfunction, urethral discharge  MUSCULOSKELETAL: NEGATIVE for significant arthralgias or myalgia  NEURO: NEGATIVE for weakness, dizziness or paresthesias  PSYCHIATRIC: NEGATIVE for changes in mood or affect    This document serves as a record of the services and decisions personally performed and made by Dr. Parson. It was created on his behalf by Whit Lagos, a trained medical scribe. The creation of this document is based the provider's statements to the medical scribe.  Whit Lagos,  6:35 PM     OBJECTIVE:   /73 (BP Location: Left arm, Patient Position: Sitting, Cuff Size: Adult Large)   Pulse 64   Temp 98.3  F (36.8  C) (Oral)   Resp 18   Ht 1.689 m (5' 6.5\")   Wt 67.9 kg (149 lb 9.6 oz)   SpO2 99%   BMI 23.78 kg/m      EXAM:  GENERAL: healthy, alert and no distress  EYES: Eyes grossly normal to inspection, PERRL and conjunctivae and sclerae normal  HENT: ear canals and TM's normal, nose and mouth without ulcers or lesions  NECK: no adenopathy, no asymmetry, masses, or scars and thyroid normal to palpation  RESP: lungs clear to auscultation - no rales, rhonchi or wheezes  CV: regular rate and rhythm, normal S1 S2, no S3 or S4, no murmur, click or rub, no peripheral edema and peripheral pulses strong  ABDOMEN: soft, nontender, no hepatosplenomegaly, no masses and bowel sounds normal   (male): normal male genitalia without lesions or urethral discharge, no hernia  MS: no gross musculoskeletal defects noted, no edema  SKIN: no suspicious lesions or rashes to visible skin   NEURO: Normal strength and tone, mentation intact and speech normal, " DTRs symmetrical, cranial nerves 2-12 intact   PSYCH: mentation appears normal, affect normal/bright    Diagnostic Test Results:   Ref. Range 1/7/2020 07:21   Cholesterol Latest Ref Range: <200 mg/dL 154   HDL Cholesterol Latest Ref Range: >39 mg/dL 41   LDL Cholesterol Calculated Latest Ref Range: <100 mg/dL 79   Non HDL Cholesterol Latest Ref Range: <130 mg/dL 113   Triglycerides Latest Ref Range: <150 mg/dL 169 (H)       ASSESSMENT/PLAN:   (Z00.00) Routine general medical examination at a health care facility  (primary encounter diagnosis)  Comment: Negative screening exam; up-to-date on preventive services.   Plan: Follow-up in one year     (I25.10) Coronary artery disease involving native coronary artery of native heart without angina pectoris  (E78.5) Hyperlipidemia LDL goal <70  Comment: recent diagnosis s/p stenting with a remaining 80% lesion  Plan: Comprehensive metabolic panel (BMP + Alb, Alk         Phos, ALT, AST, Total. Bili, TP), TSH with free        T4 reflex        Follow-up with Cardiology in 10 months.    (I10) Essential hypertension with goal blood pressure less than 140/90  Comment: well-controlled  Plan: Comprehensive metabolic panel (BMP + Alb, Alk         Phos, ALT, AST, Total. Bili, TP), CBC with         platelets        Recheck in 3 months.    (R73.03) Prediabetes   Comment: possible intolerance to Metformin (vs low normal blood pressures)  Plan: Comprehensive metabolic panel (BMP + Alb, Alk         Phos, ALT, AST, Total. Bili, TP), Hemoglobin         A1c, TSH with free T4 reflex        Discontinue Metformin. Return in about 3 months (around 4/30/2020) for prediabetes.  (HgbA1c and glucose)      COUNSELING:  Reviewed preventive health counseling, as reflected in patient instructions  Special attention given to:        Regular exercise       Healthy diet/nutrition    Estimated body mass index is 23.78 kg/m  as calculated from the following:    Height as of this encounter: 1.689 m (5'  "6.5\").    Weight as of this encounter: 67.9 kg (149 lb 9.6 oz). He walks 30 - 40 minutes 5 days per week, and he hopes to join a gym.       reports that he has quit smoking. He has never used smokeless tobacco.      Counseling Resources:  ATP IV Guidelines  Pooled Cohorts Equation Calculator  FRAX Risk Assessment  ICSI Preventive Guidelines  Dietary Guidelines for Americans, 2010  USDA's MyPlate  ASA Prophylaxis  Lung CA Screening    The information in this document, created by the medical scribe for me, accurately reflects the services I personally performed and the decisions made by me. I have reviewed and approved this document for accuracy prior to leaving the patient care area.  Lamin Parson MD    "

## 2020-02-01 DIAGNOSIS — E87.1 HYPONATREMIA: Primary | ICD-10-CM

## 2020-03-09 DIAGNOSIS — I10 HTN, GOAL BELOW 140/90: ICD-10-CM

## 2020-03-11 NOTE — TELEPHONE ENCOUNTER
ATENOLOL 25 MG TABLET     Last Written Prescription Date:  12/27/2019  Last Fill Quantity: 30,   # refills: 3  Last Office Visit : 12/2/2019  Future Office visit:  12/7/2020    Routing refill request to provider for review/approval because:  Last Order was 30 Tabs, 3 refills for Pt care.     Continue this med??   Change Med?? Nothing mentioned is doctor's last note to continue this medication for Pt care??  Refer to clinic for review and refills Per Providers orders.    Maggy Waters RN  Central Triage Red Flags/Med Refills

## 2020-03-13 NOTE — TELEPHONE ENCOUNTER
[facility] Call Center    Phone Message    May a detailed message be left on voicemail: no     Reason for Call: Medication Question or concern regarding medication   Prescription Clarification  Name of Medication:   atenolol (TENORMIN) 25 MG tablet   Prescribing Provider: Yayo Callejas MD   Pharmacy: [phone]  [facility] [address] [place] [facility], [place] - [address]    What on the order needs clarification? Should Pt stay on this medication or should it be stopped?  Notes do not indicate and we have been trying to get an answer on this, [facility] calling again about this refill.  Please call: [phone]      Action Taken: Message routed to:  [facility] ([facility]): Cardiology    Travel Screening: Not Applicable

## 2020-03-17 RX ORDER — ATENOLOL 25 MG/1
25 TABLET ORAL DAILY
Qty: 90 TABLET | Refills: 3 | Status: SHIPPED | OUTPATIENT
Start: 2020-03-17 | End: 2020-10-06

## 2020-06-04 DIAGNOSIS — I10 HTN, GOAL BELOW 140/90: ICD-10-CM

## 2020-06-09 RX ORDER — HYDROCHLOROTHIAZIDE 25 MG/1
TABLET ORAL
Qty: 90 TABLET | Refills: 0 | Status: SHIPPED | OUTPATIENT
Start: 2020-06-09 | End: 2020-09-11

## 2020-06-12 ENCOUNTER — VIRTUAL VISIT (OUTPATIENT)
Dept: FAMILY MEDICINE | Facility: CLINIC | Age: 45
End: 2020-06-12
Payer: COMMERCIAL

## 2020-06-12 VITALS — WEIGHT: 145 LBS | BODY MASS INDEX: 22.76 KG/M2 | HEIGHT: 67 IN

## 2020-06-12 DIAGNOSIS — E87.1 HYPONATREMIA: ICD-10-CM

## 2020-06-12 DIAGNOSIS — R10.13 EPIGASTRIC PAIN: Primary | ICD-10-CM

## 2020-06-12 DIAGNOSIS — E78.5 HYPERLIPIDEMIA LDL GOAL <70: ICD-10-CM

## 2020-06-12 DIAGNOSIS — I25.10 CORONARY ARTERY DISEASE INVOLVING NATIVE CORONARY ARTERY OF NATIVE HEART WITHOUT ANGINA PECTORIS: ICD-10-CM

## 2020-06-12 DIAGNOSIS — R12 HEARTBURN: ICD-10-CM

## 2020-06-12 DIAGNOSIS — R73.03 PREDIABETES: ICD-10-CM

## 2020-06-12 DIAGNOSIS — I10 ESSENTIAL HYPERTENSION WITH GOAL BLOOD PRESSURE LESS THAN 140/90: ICD-10-CM

## 2020-06-12 PROCEDURE — 99214 OFFICE O/P EST MOD 30 MIN: CPT | Mod: TEL | Performed by: FAMILY MEDICINE

## 2020-06-12 RX ORDER — OMEPRAZOLE 40 MG/1
40 CAPSULE, DELAYED RELEASE ORAL
Qty: 180 CAPSULE | Refills: 0 | Status: SHIPPED | OUTPATIENT
Start: 2020-06-12 | End: 2020-09-16

## 2020-06-12 ASSESSMENT — MIFFLIN-ST. JEOR: SCORE: 1498.41

## 2020-06-12 ASSESSMENT — PAIN SCALES - GENERAL: PAINLEVEL: NO PAIN (0)

## 2020-06-12 NOTE — PROGRESS NOTES
"Micheal Husain is a 44 year old male who is being evaluated via a billable telephone visit.      The patient has been notified of following:     \"This telephone visit will be conducted via a call between you and your physician/provider. We have found that certain health care needs can be provided without the need for a physical exam.  This service lets us provide the care you need with a short phone conversation.  If a prescription is necessary we can send it directly to your pharmacy.  If lab work is needed we can place an order for that and you can then stop by our lab to have the test done at a later time.    Telephone visits are billed at different rates depending on your insurance coverage. During this emergency period, for some insurers they may be billed the same as an in-person visit.  Please reach out to your insurance provider with any questions.    If during the course of the call the physician/provider feels a telephone visit is not appropriate, you will not be charged for this service.\"    Patient has given verbal consent for Telephone visit?  Yes    What phone number would you like to be contacted at? [phone]    How would you like to obtain your AVS? Stephanie    Subjective     Micheal Husain is a 44 year old male who presents via phone visit today for the following health issues:    HPI  ABDOMINAL   PAIN     Onset: 2-3 week ago     Description:   Character: Burning  Location: all over stomach   Radiation: chest     Intensity: mild    Progression of Symptoms:  constant    Accompanying Signs & Symptoms:  Fever/Chills?: no   Gas/Bloating: YES  Nausea: YES  Vomitting: no   Diarrhea?: no   Constipation:YES  Dysuria or Hematuria: no    History:   Trauma: no   Previous similar pain: YES   Previous tests done: none    Precipitating factors:   Does the pain change with:     Food: YES     BM: YES    Urination: no     Alleviating factors:  No     Therapies Tried and outcome: no     LMP:  not applicable " "      Past medical, family, and social histories, medications, and allergies are reviewed and updated in Epic.  This is most notable for his coronary artery disease (under age 50)  Allergies: Metoprolol    Review Of Systems:   Constitutional, HEENT, cardiovascular, pulmonary, gi and gu systems are negative, except as otherwise noted.    Objective:     Reported vitals:Ht 1.689 m (5' 6.5\")   Wt 65.8 kg (145 lb)   BMI 23.05 kg/m     healthy, alert and no distress  RESP: No cough, no audible wheezing, able to talk in full sentences.  PSYCH: Alert and oriented times 3; coherent speech, normal   rate and volume, able to articulate logical thoughts, able   to abstract reason, no tangential thoughts, no hallucinations   or delusions, and affect is normal and pleasant  The remainder of the exam cannot be completed due to this being a telephone visit.      Diagnostic Test Results:     Ref. Range 1/7/2020 07:21 1/30/2020 19:27   Sodium Latest Ref Range: 133 - 144 mmol/L  132 (L)   Potassium Latest Ref Range: 3.4 - 5.3 mmol/L  4.0   Chloride Latest Ref Range: 94 - 109 mmol/L  94   Carbon Dioxide Latest Ref Range: 20 - 32 mmol/L  30   Urea Nitrogen Latest Ref Range: 7 - 30 mg/dL  9   Creatinine Latest Ref Range: 0.66 - 1.25 mg/dL  0.72   GFR Estimate Latest Ref Range: >60 mL/min/1.73_m2  >90   GFR Estimate If Black Latest Ref Range: >60 mL/min/1.73_m2  >90   Calcium Latest Ref Range: 8.5 - 10.1 mg/dL  9.1   Anion Gap Latest Ref Range: 3 - 14 mmol/L  8   Albumin Latest Ref Range: 3.4 - 5.0 g/dL  4.7   Protein Total Latest Ref Range: 6.8 - 8.8 g/dL  7.9   Bilirubin Total Latest Ref Range: 0.2 - 1.3 mg/dL  0.4   Alkaline Phosphatase Latest Ref Range: 40 - 150 U/L  75   ALT Latest Ref Range: 0 - 70 U/L  36   AST Latest Ref Range: 0 - 45 U/L  27   Hemoglobin A1C Latest Ref Range: 0 - 5.6 %  5.8 (H)   Cholesterol Latest Ref Range: <200 mg/dL 154    HDL Cholesterol Latest Ref Range: >39 mg/dL 41    LDL Cholesterol Calculated Latest " Ref Range: <100 mg/dL 79    Non HDL Cholesterol Latest Ref Range: <130 mg/dL 113    Triglycerides Latest Ref Range: <150 mg/dL 169 (H)    TSH Latest Ref Range: 0.40 - 4.00 mU/L  2.05   Glucose Latest Ref Range: 70 - 99 mg/dL  86   WBC Latest Ref Range: 4.0 - 11.0 10e9/L  8.4   Hemoglobin Latest Ref Range: 13.3 - 17.7 g/dL  13.6   Hematocrit Latest Ref Range: 40.0 - 53.0 %  41.1   Platelet Count Latest Ref Range: 150 - 450 10e9/L  294       =====    ASSESSMENT/PLAN:  (R10.13) Epigastric pain  (primary encounter diagnosis)  (R12) Heartburn  Comment: Rule out GERD, gastritis, PUD, esophagitis, and or H. pylori infection.  Plan: omeprazole (PRILOSEC) 40 MG DR capsule,         Helicobacter pylori Antigen Stool, CBC with         platelets        Handouts provided.  Plan to take omeprazole for 2 to 3 months total.  Add H. pylori treatment as needed.  Follow-up in clinic if symptoms worsen despite treatment, if there is no improvement after 1 month, or if the problem is not resolved in 3 months.    (I25.10) Coronary artery disease involving native coronary artery of native heart without angina pectoris  (E78.5) Hyperlipidemia LDL goal <70  Comment: Clinically stable.  The patient emphasizes that his current symptoms do not resemble coronary chest pain.  He is on a high-intensity statin, and he would like to have a 6-month recheck on his lipid levels  Plan: Lipid panel reflex to direct LDL Non-fasting            (R73.03) Prediabetes  Comment: He stopped taking metformin in approximately February, and he is interested to see where he stands as far as his prediabetes  Plan: Glucose, Hemoglobin A1c        I may recommend he restart metformin based on these results    (I10) Essential hypertension with goal blood pressure less than 140/90  Comment: Lab update  Plan: Potassium, Creatinine, CBC with platelets            (E87.1) Hyponatremia  Comment: Noted at his last blood draw  Plan: Sodium and osmolality were ordered earlier  this year    Phone call duration:  23 minutes    Lamin Parson MD

## 2020-06-12 NOTE — PATIENT INSTRUCTIONS
At [facility], we strive to deliver an exceptional experience to you, every time we see you. If you receive a survey, please complete it as we do value your feedback.  If you have MyChart, you can expect to receive results automatically within 24 hours of their completion.  Your provider will send a note interpreting your results as well.   If you do not have MyChart, you should receive your results in about a week by mail.    Your care team:                            Family Medicine Internal Medicine   MD Dani Phelan MD Shantel Branch-Fleming, MD Katya Georgiev PA-C Megan Hill, APRN CNP    Eduardo Vincent, MD Pediatrics   Flavio Turpin, PAHEATHER Campbell, MD Wendy Jernigan APRN CNP   MD Liz Meraz MD Deborah Mielke, MD Kim Thein, APRN [facility]      Clinic hours: Monday - Thursday 7 am-7 pm; Fridays 7 am-5 pm.   Urgent care: Monday - Friday 11 am-9 pm; Saturday and Sunday 9 am-5 pm.    Clinic: [phone]       [facility]: Monday - Thursday 8 am - 7 pm; Friday 8 am - 6 pm  [facility]: [phone]     Use www.[email or web address].org for 24/7 diagnosis and treatment of dozens of conditions.  Patient Education     GERD (Adult)    The esophagus is a tube that carries food from the mouth to the stomach. A valve (the LES, lower esophageal sphincter) at the lower end of the esophagus prevents stomach acid from flowing upward. When this valve doesn't work properly, stomach contents may repeatedly flow back up (reflux) into the esophagus. This is called gastroesophageal reflux disease (GERD). GERD can irritate the esophagus. It can cause problems with pain, swallowing or breathing. In severe cases, GERD can cause recurrent pneumonia (from aspiration or breathing in particles) or other serious problems.  Symptoms of reflux include burning, pressure or sharp pain in the upper abdomen or mid to lower chest.  "The pain can spread to the neck, back, or shoulder. There may be belching, an acid taste in the back of the throat, chronic cough, or sore throat, or hoarseness. GERD symptoms often occur during the day after a big meal. They can also occur at night when lying down.   Home care  Lifestyle changes can help reduce symptoms. If needed, your healthcare provider may prescribe medicines. Symptoms often improve with treatment, but if treatment is stopped, the symptoms often return after a few months. So most persons with GERD will need to continue treatment or get treatment on and off.  Lifestyle changes    Limit or avoid fatty, fried, and spicy foods, as well as coffee, chocolate, mint, and foods with high acid content such as tomatoes and citrus fruit and juices (orange, grapefruit, lemon).    Don t eat large meals, especially at night. Frequent, smaller meals are best. Don't lie down right after eating. And don t eat anything 3 hours before going to bed.    Don't drink alcohol or smoke. As much as possible, stay away from second hand smoke.    If you are overweight, losing weight will reduce symptoms.     Don't wear tight clothing around your stomach area.    If your symptoms occur during sleep, use a foam wedge to elevate your upper body (not just your head.) Or, place 4\" blocks under the head of your bed. Or use 2 bed risers under your bedframe.  Medicines  If needed, medicines can help relieve the symptoms of GERD and prevent damage to the esophagus. Discuss a medicine plan with your healthcare provider. This may include one or more of the following medicines:    Antacids to help neutralize the normal acids in your stomach.    Acid blockers (Histamine or H2 blockers) to decrease acid production.    Acid inhibitors (proton pump inhibitors PPIs) to decrease acid production in a different way than the blockers. They may work better, but can take a little longer to take effect.  Take an antacid 30 to 60 minutes after " eating and at bedtime, but not at the same time as an acid blocker.  Try not to take medicines such as ibuprofen and aspirin. If you are taking aspirin for your heart or other medical reasons, talk to your healthcare provider about stopping it.  Follow-up care  Follow up with your healthcare provider or as advised by our staff.  When to seek medical advice  Call your healthcare provider if any of the following occur:    Stomach pain gets worse or moves to the lower right abdomen (appendix area)    Chest pain appears or gets worse, or spreads to the back, neck, shoulder, or arm    An over-the-counter trial of medicine doesn't relieve your symptoms    Weight loss that can't be explained    Trouble or pain swallowing    Frequent vomiting (can t keep down liquids)    Blood in the stool or vomit (red or black in color)    Feeling weak or dizzy    Fever of 100.4 F (38 C) or higher, or as directed by your healthcare provider  Date Last Reviewed: 3/1/2018    8631-0071 [employer]. [address] [place] [address]. All rights reserved. This information is not intended as a substitute for professional medical care. Always follow your healthcare professional's instructions.           Patient Education     Lifestyle Changes for Controlling GERD  When you have GERD, stomach acid feels as if it s backing up toward your mouth. Whether or not you take medicine to control your GERD, your symptoms can often be improved with lifestyle changes. Talk to your healthcare provider about the following suggestions. These suggestions may help you get relief from your symptoms.      Raise your head  Reflux is more likely to strike when you re lying down flat, because stomach fluid can flow backward more easily. Raising the head of your bed 4 to 6 inches can help. To do this:    Slide blocks or books under the legs at the head of your bed. Or, place a wedge under the mattress. Many [employer] can make a suitable wedge  for you. The wedge should run from your waist to the top of your head.    Don t just prop your head on several pillows. This increases pressure on your stomach. It can make GERD worse.  Watch your eating habits  Certain foods may increase the acid in your stomach or relax the lower esophageal sphincter. This makes GERD more likely. It s best to avoid the following if they cause you symptoms:    Coffee, tea, and carbonated drinks (with and without caffeine)    Fatty, fried, or spicy food    Mint, chocolate, onions, and tomatoes    Peppermint    Any other foods that seem to irritate your stomach or cause you pain  Relieve the pressure  Tips include the following:    Eat smaller meals, even if you have to eat more often.    Don t lie down right after you eat. Wait a few hours for your stomach to empty.    Avoid tight belts and tight-fitting clothes.    Lose excess weight.  Tobacco and alcohol  Avoid smoking tobacco and drinking alcohol. They can make GERD symptoms worse.  Date Last Reviewed: 7/1/2016 2000-2019 [employer]. [address] [place] [address]. All rights reserved. This information is not intended as a substitute for professional medical care. Always follow your healthcare professional's instructions.           Patient Education     H. Pylori Infection with Peptic Ulcer    A peptic ulcer is an open sore in the lining of the stomach. It may also form in the lining of the first part of the small intestine (duodenum). Symptoms of a peptic ulcer include stomach pain and upset. Nausea, vomiting, bloating, or bleeding may sometimes occur. In many cases, bacteria called H. pylori are thought to be involved in the development of a peptic ulcer.  Many people have H. pylori in their bodies. Most of the time, it causes no problems. In some people, though, the H. pylori infection causes irritation of the stomach lining. This may make the lining more likely to be damaged by normal stomach  acids. H. pylori may also increase the amount of acid in the stomach. It is not clear why this infection leads to problems in some people and not in others.  Tests may be done to check for H. pylori infection. These include a blood test, a breath test, and a stool test. In some cases, a test called endoscopy may be done. During this test, a thin, lighted tube is put into the mouth and down the throat. The healthcare provider can look at the esophagus, stomach, and duodenum through this tube. During this test, a tiny sample of stomach lining (biopsy) may be taken and tested for H. pylori.  Home care    Medicines are used to treat H. pylori infection. Two or more medicines are usually taken together for about 2 weeks.    Take all prescribed medicines as directed. Take all of the medicines until they are gone or you are told to stop. This is very important. If you do not finish the medicines, the infection may remain and may be harder to treat.    Ask your healthcare provider what side effects the medicines might cause. These can include stomach cramps, diarrhea, or constipation.    After the medicine is finished, you may have another test to see if H. pylori infection is still present.    Avoid alcohol during treatment.  Follow-up care  Follow up with your healthcare provider as directed. Be sure to return to be retested for H. pylori after treatment.  When to seek medical advice  Call your healthcare provider for any of the following:    Stomach pain that worsens or moves to the right lower part of the abdomen    Chest pain appears or worsens, or spreads to the back, neck, shoulder, or arm    Vomiting    Blood in stool or vomit    Feeling weak or dizzy  Date Last Reviewed: 2/1/2018 2000-2019 The [employer]. [address] [place] [address], [place] [address]. All rights reserved. This information is not intended as a substitute for professional medical care. Always follow your healthcare professional's  instructions.

## 2020-06-14 DIAGNOSIS — R10.13 EPIGASTRIC PAIN: ICD-10-CM

## 2020-06-14 DIAGNOSIS — R73.03 PREDIABETES: ICD-10-CM

## 2020-06-14 DIAGNOSIS — R12 HEARTBURN: ICD-10-CM

## 2020-06-14 DIAGNOSIS — I10 ESSENTIAL HYPERTENSION WITH GOAL BLOOD PRESSURE LESS THAN 140/90: ICD-10-CM

## 2020-06-14 DIAGNOSIS — I25.10 CORONARY ARTERY DISEASE INVOLVING NATIVE CORONARY ARTERY OF NATIVE HEART WITHOUT ANGINA PECTORIS: ICD-10-CM

## 2020-06-14 DIAGNOSIS — E78.5 HYPERLIPIDEMIA LDL GOAL <70: ICD-10-CM

## 2020-06-14 LAB
CHOLEST SERPL-MCNC: 132 MG/DL
CREAT SERPL-MCNC: 0.88 MG/DL (ref 0.66–1.25)
ERYTHROCYTE [DISTWIDTH] IN BLOOD BY AUTOMATED COUNT: 13 % (ref 10–15)
GFR SERPL CREATININE-BSD FRML MDRD: >90 ML/MIN/{1.73_M2}
GLUCOSE SERPL-MCNC: 94 MG/DL (ref 70–99)
HBA1C MFR BLD: 5.9 % (ref 0–5.6)
HCT VFR BLD AUTO: 40.6 % (ref 40–53)
HDLC SERPL-MCNC: 40 MG/DL
HGB BLD-MCNC: 13.4 G/DL (ref 13.3–17.7)
LDLC SERPL CALC-MCNC: 69 MG/DL
MCH RBC QN AUTO: 27.6 PG (ref 26.5–33)
MCHC RBC AUTO-ENTMCNC: 33 G/DL (ref 31.5–36.5)
MCV RBC AUTO: 84 FL (ref 78–100)
NONHDLC SERPL-MCNC: 92 MG/DL
PLATELET # BLD AUTO: 277 10E9/L (ref 150–450)
POTASSIUM SERPL-SCNC: 3.8 MMOL/L (ref 3.4–5.3)
RBC # BLD AUTO: 4.85 10E12/L (ref 4.4–5.9)
TRIGL SERPL-MCNC: 114 MG/DL
WBC # BLD AUTO: 6.6 10E9/L (ref 4–11)

## 2020-06-14 PROCEDURE — 85027 COMPLETE CBC AUTOMATED: CPT | Performed by: FAMILY MEDICINE

## 2020-06-14 PROCEDURE — 82947 ASSAY GLUCOSE BLOOD QUANT: CPT | Performed by: FAMILY MEDICINE

## 2020-06-14 PROCEDURE — 82565 ASSAY OF CREATININE: CPT | Performed by: FAMILY MEDICINE

## 2020-06-14 PROCEDURE — 84132 ASSAY OF SERUM POTASSIUM: CPT | Performed by: FAMILY MEDICINE

## 2020-06-14 PROCEDURE — 80061 LIPID PANEL: CPT | Performed by: FAMILY MEDICINE

## 2020-06-14 PROCEDURE — 36415 COLL VENOUS BLD VENIPUNCTURE: CPT | Performed by: FAMILY MEDICINE

## 2020-06-14 PROCEDURE — 83036 HEMOGLOBIN GLYCOSYLATED A1C: CPT | Performed by: FAMILY MEDICINE

## 2020-06-18 ENCOUNTER — TELEPHONE (OUTPATIENT)
Dept: FAMILY MEDICINE | Facility: CLINIC | Age: 45
End: 2020-06-18

## 2020-06-18 DIAGNOSIS — R10.13 EPIGASTRIC PAIN: ICD-10-CM

## 2020-06-18 DIAGNOSIS — R12 HEARTBURN: ICD-10-CM

## 2020-06-18 PROCEDURE — 87338 HPYLORI STOOL AG IA: CPT | Performed by: FAMILY MEDICINE

## 2020-06-19 LAB — H PYLORI AG STL QL IA: NEGATIVE

## 2020-07-15 ENCOUNTER — TELEPHONE (OUTPATIENT)
Dept: CARDIOLOGY | Facility: CLINIC | Age: 45
End: 2020-07-15

## 2020-07-15 NOTE — TELEPHONE ENCOUNTER
OZ for patient to call back to discuss.   Claire Marrero RN   [facility] Care Coordinator  [facility]

## 2020-07-15 NOTE — TELEPHONE ENCOUNTER
Patient states he has been having mild leg pain and shortness of breath. States nothing too serious, but would like call back to discuss. Call disconnected before writer could get nurse on the line. Please advise.

## 2020-07-21 NOTE — TELEPHONE ENCOUNTER
Called and left message for patient. Stated that he had placed a call last week and we had left a message but there was no return call. Asked him to please call back if he was still having issues.     CAREN Vogel

## 2020-07-22 NOTE — TELEPHONE ENCOUNTER
"Called and spoke to patient. He states that he feels well now. He had moved houses and felt tired, had pain \"everywhere\" and some shortness of breath. He does have nitroglycerin available , but did not take it.   Reviewed use of the nitro if the need arises. Since he is feeling better, he will monitor his symptoms. He will call for an earlier appt if needed.     CAREN Vogel    "

## 2020-09-06 DIAGNOSIS — I10 HTN, GOAL BELOW 140/90: ICD-10-CM

## 2020-09-08 DIAGNOSIS — R12 HEARTBURN: ICD-10-CM

## 2020-09-08 DIAGNOSIS — R10.13 EPIGASTRIC PAIN: ICD-10-CM

## 2020-09-09 NOTE — TELEPHONE ENCOUNTER
Routing refill request to provider for review/approval because:  Labs not current:  Sodium    Orly Aguilar RN, [facility] Triage

## 2020-09-10 DIAGNOSIS — I25.10 CORONARY ARTERY DISEASE INVOLVING NATIVE CORONARY ARTERY OF NATIVE HEART WITHOUT ANGINA PECTORIS: ICD-10-CM

## 2020-09-10 NOTE — TELEPHONE ENCOUNTER
Routing refill request to provider for review/approval because:  Clopidogrel is on medication list. RN can not fill per protocol.    Orly Aguilar RN, [facility] Triage

## 2020-09-11 RX ORDER — HYDROCHLOROTHIAZIDE 25 MG/1
TABLET ORAL
Qty: 90 TABLET | Refills: 0 | Status: SHIPPED | OUTPATIENT
Start: 2020-09-11 | End: 2020-09-26 | Stop reason: SINTOL

## 2020-09-12 RX ORDER — ASPIRIN 81 MG/1
81 TABLET ORAL DAILY
Qty: 90 TABLET | Refills: 3 | Status: SHIPPED | OUTPATIENT
Start: 2020-09-12 | End: 2021-09-02

## 2020-09-16 RX ORDER — OMEPRAZOLE 40 MG/1
40 CAPSULE, DELAYED RELEASE ORAL
Qty: 60 CAPSULE | Refills: 0 | Status: SHIPPED | OUTPATIENT
Start: 2020-09-16 | End: 2020-09-22

## 2020-09-21 ENCOUNTER — OFFICE VISIT (OUTPATIENT)
Dept: URGENT CARE | Facility: URGENT CARE | Age: 45
End: 2020-09-21
Payer: COMMERCIAL

## 2020-09-21 VITALS
WEIGHT: 141.6 LBS | OXYGEN SATURATION: 100 % | DIASTOLIC BLOOD PRESSURE: 74 MMHG | TEMPERATURE: 97.6 F | HEART RATE: 53 BPM | RESPIRATION RATE: 14 BRPM | BODY MASS INDEX: 22.51 KG/M2 | SYSTOLIC BLOOD PRESSURE: 127 MMHG

## 2020-09-21 DIAGNOSIS — R30.0 DYSURIA: Primary | ICD-10-CM

## 2020-09-21 PROBLEM — N18.1: Status: ACTIVE | Noted: 2019-10-23

## 2020-09-21 PROBLEM — I12.9: Status: ACTIVE | Noted: 2019-10-23

## 2020-09-21 LAB
ALBUMIN UR-MCNC: NEGATIVE MG/DL
APPEARANCE UR: CLEAR
BASOPHILS # BLD AUTO: 0 10E9/L (ref 0–0.2)
BASOPHILS NFR BLD AUTO: 0.3 %
BILIRUB UR QL STRIP: NEGATIVE
COLOR UR AUTO: YELLOW
DIFFERENTIAL METHOD BLD: ABNORMAL
EOSINOPHIL # BLD AUTO: 0.1 10E9/L (ref 0–0.7)
EOSINOPHIL NFR BLD AUTO: 1.7 %
ERYTHROCYTE [DISTWIDTH] IN BLOOD BY AUTOMATED COUNT: 12.4 % (ref 10–15)
GLUCOSE UR STRIP-MCNC: NEGATIVE MG/DL
HCT VFR BLD AUTO: 39.2 % (ref 40–53)
HGB BLD-MCNC: 13.4 G/DL (ref 13.3–17.7)
HGB UR QL STRIP: ABNORMAL
KETONES UR STRIP-MCNC: NEGATIVE MG/DL
LEUKOCYTE ESTERASE UR QL STRIP: NEGATIVE
LYMPHOCYTES # BLD AUTO: 2.5 10E9/L (ref 0.8–5.3)
LYMPHOCYTES NFR BLD AUTO: 39.2 %
MCH RBC QN AUTO: 27.5 PG (ref 26.5–33)
MCHC RBC AUTO-ENTMCNC: 34.2 G/DL (ref 31.5–36.5)
MCV RBC AUTO: 80 FL (ref 78–100)
MONOCYTES # BLD AUTO: 0.6 10E9/L (ref 0–1.3)
MONOCYTES NFR BLD AUTO: 9.6 %
NEUTROPHILS # BLD AUTO: 3.2 10E9/L (ref 1.6–8.3)
NEUTROPHILS NFR BLD AUTO: 49.2 %
NITRATE UR QL: NEGATIVE
PH UR STRIP: 7 PH (ref 5–7)
PLATELET # BLD AUTO: 308 10E9/L (ref 150–450)
RBC # BLD AUTO: 4.88 10E12/L (ref 4.4–5.9)
RBC #/AREA URNS AUTO: NORMAL /HPF
SOURCE: ABNORMAL
SP GR UR STRIP: 1.01 (ref 1–1.03)
UROBILINOGEN UR STRIP-ACNC: 0.2 EU/DL (ref 0.2–1)
WBC # BLD AUTO: 6.5 10E9/L (ref 4–11)
WBC #/AREA URNS AUTO: NORMAL /HPF

## 2020-09-21 PROCEDURE — 99214 OFFICE O/P EST MOD 30 MIN: CPT | Performed by: PHYSICIAN ASSISTANT

## 2020-09-21 PROCEDURE — G0103 PSA SCREENING: HCPCS | Performed by: PHYSICIAN ASSISTANT

## 2020-09-21 PROCEDURE — 87086 URINE CULTURE/COLONY COUNT: CPT | Performed by: PHYSICIAN ASSISTANT

## 2020-09-21 PROCEDURE — 36415 COLL VENOUS BLD VENIPUNCTURE: CPT | Performed by: PHYSICIAN ASSISTANT

## 2020-09-21 PROCEDURE — 81001 URINALYSIS AUTO W/SCOPE: CPT | Performed by: PHYSICIAN ASSISTANT

## 2020-09-21 PROCEDURE — 85025 COMPLETE CBC W/AUTO DIFF WBC: CPT | Performed by: PHYSICIAN ASSISTANT

## 2020-09-21 PROCEDURE — 80053 COMPREHEN METABOLIC PANEL: CPT | Performed by: PHYSICIAN ASSISTANT

## 2020-09-21 ASSESSMENT — ENCOUNTER SYMPTOMS
DIARRHEA: 0
DIAPHORESIS: 0
SHORTNESS OF BREATH: 0
EYE ITCHING: 0
POLYDIPSIA: 0
EYES NEGATIVE: 1
LIGHT-HEADEDNESS: 0
HEMATURIA: 0
COUGH: 0
MYALGIAS: 0
FEVER: 0
EYE DISCHARGE: 0
DIZZINESS: 0
PALPITATIONS: 0
CARDIOVASCULAR NEGATIVE: 1
WHEEZING: 0
BACK PAIN: 1
HEADACHES: 0
CHILLS: 0
ADENOPATHY: 0
WEAKNESS: 0
VOMITING: 0
NEUROLOGICAL NEGATIVE: 1
RESPIRATORY NEGATIVE: 1
RHINORRHEA: 0
GASTROINTESTINAL NEGATIVE: 1
NAUSEA: 0
ABDOMINAL PAIN: 0
SORE THROAT: 0
DYSURIA: 1
FREQUENCY: 0
CHEST TIGHTNESS: 0
EYE REDNESS: 0
ENDOCRINE NEGATIVE: 1
CONSTITUTIONAL NEGATIVE: 1

## 2020-09-21 ASSESSMENT — PAIN SCALES - GENERAL: PAINLEVEL: SEVERE PAIN (6)

## 2020-09-21 NOTE — PROGRESS NOTES
Chief Complaint:    Chief Complaint   Patient presents with     Back Pain     lower back pain and burning sensation for the last couple days      Urinary Problem     burning when urinating        HPI:  Micheal Husain is a 45 year old male who has symptoms of urinary dysuria and low back pain for 2 day(s).  he denies urgency, frequency, nausea, vomiting, fever and chills, flank pain.    ROS:      Review of Systems   Constitutional: Negative.  Negative for chills, diaphoresis and fever.   HENT: Negative.  Negative for congestion, ear pain, rhinorrhea and sore throat.    Eyes: Negative.  Negative for discharge, redness and itching.   Respiratory: Negative.  Negative for cough, chest tightness, shortness of breath and wheezing.    Cardiovascular: Negative.  Negative for chest pain and palpitations.   Gastrointestinal: Negative.  Negative for abdominal pain, diarrhea, nausea and vomiting.   Endocrine: Negative.  Negative for polydipsia and polyuria.   Genitourinary: Positive for dysuria. Negative for frequency, hematuria and urgency.   Musculoskeletal: Positive for back pain. Negative for myalgias.   Skin: Negative for rash.   Allergic/Immunologic: Negative for immunocompromised state.   Neurological: Negative.  Negative for dizziness, weakness, light-headedness and headaches.   Hematological: Negative for adenopathy.       Family History   Family History   Problem Relation Age of Onset     Cerebrovascular Disease Father      Heart Disease Father              Kidney failure Mother              Breast Cancer Sister      Coronary Stenting Brother 47     Coronary Stenting Brother 47     Diabetes No family hx of         Problem history  Patient Active Problem List   Diagnosis     Family history of ischemic heart disease     Hyperlipidemia LDL goal <70     Prediabetes     Essential hypertension with goal blood pressure less than 140/90     Coronary artery disease involving native coronary artery of native heart  without angina pectoris     Hypertensive kidney disease, stage 1        Allergies  Allergies   Allergen Reactions     Metoprolol Other (See Comments)     ED        Social History  Social History     Socioeconomic History     Marital status:      Spouse name: Not on file     Number of children: 2     Years of education: Not on file     Highest education level: Not on file   Occupational History     Occupation: IT   Social Needs     Financial resource strain: Not on file     Food insecurity     Worry: Not on file     Inability: Not on file     Transportation needs     Medical: Not on file     Non-medical: Not on file   Tobacco Use     Smoking status: Former Smoker     Packs/day: 0.20     Years: 8.00     Pack years: 1.60     Smokeless tobacco: Former User     Quit date: 10/1/2019     Tobacco comment: smoked less than 10 years, 3-5 cigarettes/day   Substance and Sexual Activity     Alcohol use: Never     Frequency: Never     Drug use: Never     Sexual activity: Yes   Lifestyle     Physical activity     Days per week: Not on file     Minutes per session: Not on file     Stress: Not on file   Relationships     Social connections     Talks on phone: Not on file     Gets together: Not on file     Attends Sikhism service: Not on file     Active member of club or organization: Not on file     Attends meetings of clubs or organizations: Not on file     Relationship status: Not on file     Intimate partner violence     Fear of current or ex partner: Not on file     Emotionally abused: Not on file     Physically abused: Not on file     Forced sexual activity: Not on file   Other Topics Concern     Not on file   Social History Narrative     Not on file        Current Meds    Current Outpatient Medications:      aspirin 81 MG EC tablet, Take 1 tablet (81 mg) by mouth daily, Disp: 90 tablet, Rfl: 3     atenolol (TENORMIN) 25 MG tablet, Take 1 tablet (25 mg) by mouth daily, Disp: 90 tablet, Rfl: 3     atorvastatin (LIPITOR)  40 MG tablet, Take 1 tablet (40 mg) by mouth daily, Disp: 90 tablet, Rfl: 3     clopidogrel (PLAVIX) 75 MG tablet, 75 mg daily, Disp: , Rfl:      hydrochlorothiazide (HYDRODIURIL) 25 MG tablet, TAKE 1 TABLET BY MOUTH EVERY DAY, Disp: 90 tablet, Rfl: 0     nitroGLYcerin (NITROSTAT) 0.4 MG sublingual tablet, For chest pain place 1 tablet under the tongue every 5 minutes for 3 doses. If symptoms persist 5 minutes after 1st dose call 911., Disp: 25 tablet, Rfl: 3     omeprazole (PRILOSEC) 40 MG DR capsule, TAKE 1 CAPSULE (40 MG) BY MOUTH 2 TIMES DAILY (BEFORE MEALS) (Patient not taking: Reported on 9/21/2020), Disp: 60 capsule, Rfl: 0     OBJECTIVE     Vital signs noted and reviewed by Quang Garsia PA-C  /74 (BP Location: Left arm, Patient Position: Sitting, Cuff Size: Adult Regular)   Pulse 53   Temp 97.6  F (36.4  C) (Tympanic)   Resp 14   Wt 64.2 kg (141 lb 9.6 oz)   SpO2 100%   BMI 22.51 kg/m       Physical Exam  Vitals signs and nursing note reviewed.   Constitutional:       General: He is not in acute distress.     Appearance: He is well-developed. He is not ill-appearing, toxic-appearing or diaphoretic.   HENT:      Head: Normocephalic and atraumatic.      Right Ear: Hearing, tympanic membrane, ear canal and external ear normal. Tympanic membrane is not perforated, erythematous, retracted or bulging.      Left Ear: Hearing, tympanic membrane, ear canal and external ear normal. Tympanic membrane is not perforated, erythematous, retracted or bulging.      Nose: Nose normal. No mucosal edema or rhinorrhea.      Mouth/Throat:      Pharynx: No oropharyngeal exudate or posterior oropharyngeal erythema.      Tonsils: No tonsillar exudate or tonsillar abscesses. 0 on the right. 0 on the left.   Eyes:      Pupils: Pupils are equal, round, and reactive to light.   Neck:      Musculoskeletal: Normal range of motion and neck supple.   Cardiovascular:      Rate and Rhythm: Normal rate and regular rhythm.       Heart sounds: Normal heart sounds, S1 normal and S2 normal. Heart sounds not distant. No murmur. No friction rub. No gallop.    Pulmonary:      Effort: Pulmonary effort is normal. No respiratory distress.      Breath sounds: Normal breath sounds. No decreased breath sounds, wheezing, rhonchi or rales.   Abdominal:      General: Bowel sounds are normal. There is no distension.      Palpations: Abdomen is soft.      Tenderness: There is no abdominal tenderness.      Hernia: There is no hernia in the left inguinal area or right inguinal area.   Genitourinary:     Penis: Circumcised. No phimosis, paraphimosis, erythema, tenderness, discharge, swelling or lesions.       Scrotum/Testes:         Right: Mass, tenderness or swelling not present. Right testis is descended.         Left: Mass, tenderness or swelling not present. Left testis is descended.      Epididymis:      Right: Normal. Not inflamed or enlarged. No mass or tenderness.      Left: Normal. Not inflamed or enlarged. No mass or tenderness.   Lymphadenopathy:      Cervical: No cervical adenopathy.   Skin:     General: Skin is warm and dry.      Findings: No rash.   Neurological:      Mental Status: He is alert.      Cranial Nerves: No cranial nerve deficit.   Psychiatric:         Attention and Perception: He is attentive.         Speech: Speech normal.         Behavior: Behavior normal. Behavior is cooperative.         Thought Content: Thought content normal.         Judgment: Judgment normal.               Labs:     Results for orders placed or performed in visit on 09/21/20   *UA reflex to Microscopic and Culture ([facility] (except [place] and [place])     Status: Abnormal    Specimen: Midstream Urine   Result Value Ref Range    Color Urine Yellow     Appearance Urine Clear     Glucose Urine Negative NEG^Negative mg/dL    Bilirubin Urine Negative NEG^Negative    Ketones Urine Negative NEG^Negative mg/dL    Specific Gravity Urine 1.010  1.003 - 1.035    Blood Urine Trace (A) NEG^Negative    pH Urine 7.0 5.0 - 7.0 pH    Protein Albumin Urine Negative NEG^Negative mg/dL    Urobilinogen Urine 0.2 0.2 - 1.0 EU/dL    Nitrite Urine Negative NEG^Negative    Leukocyte Esterase Urine Negative NEG^Negative    Source Midstream Urine    Urine Microscopic     Status: None   Result Value Ref Range    WBC Urine 0 - 5 OTO5^0 - 5 /HPF    RBC Urine O - 2 OTO2^O - 2 /HPF           ASSESSMENT     1. Dysuria         PLAN    Urinalysis discussed with patient  We will call with culture results if positive for growth.  No indication of UTI.    Treatment currentguidelines - also push fluids, may use Pyridium OTC prn.   Labs ordered.  Patient understands that he will need to follow up with his PCP for results.  Follow up with PCP in 2-3 days if symptoms are not improving.  Worrisome symptoms discussed with instructions to go to the ED.  Patient verbalized understanding and agreed with this plan.          Quang Garsia PA-C  9/21/2020, 6:08 PM

## 2020-09-21 NOTE — LETTER
September 22, 2020      Micheal Husain  [address]  [place] [address]        Dear ,    We are writing to inform you of your test results. Your  prostate test was normal range. Your comprehensive metabolic panel showed normal kidney function and liver function. Your sodium and chloride electrolytes were a little low. I recommend that you have these repeated with your primary within the next 2 weeks. Enclosed is a copy of these results. If you have any further questions or problems, please contact our office at [phone].      Sincerely,      Quang Garsia PA-C    Resulted Orders   *UA reflex to Microscopic and Culture ([facility] (except [place] and [place])   Result Value Ref Range    Color Urine Yellow     Appearance Urine Clear     Glucose Urine Negative NEG^Negative mg/dL    Bilirubin Urine Negative NEG^Negative    Ketones Urine Negative NEG^Negative mg/dL    Specific Gravity Urine 1.010 1.003 - 1.035    Blood Urine Trace (A) NEG^Negative    pH Urine 7.0 5.0 - 7.0 pH    Protein Albumin Urine Negative NEG^Negative mg/dL    Urobilinogen Urine 0.2 0.2 - 1.0 EU/dL    Nitrite Urine Negative NEG^Negative    Leukocyte Esterase Urine Negative NEG^Negative    Source Midstream Urine    Urine Microscopic   Result Value Ref Range    WBC Urine 0 - 5 OTO5^0 - 5 /HPF    RBC Urine O - 2 OTO2^O - 2 /HPF   CBC with platelets and differential   Result Value Ref Range    WBC 6.5 4.0 - 11.0 10e9/L    RBC Count 4.88 4.4 - 5.9 10e12/L    Hemoglobin 13.4 13.3 - 17.7 g/dL    Hematocrit 39.2 (L) 40.0 - 53.0 %    MCV 80 78 - 100 fl    MCH 27.5 26.5 - 33.0 pg    MCHC 34.2 31.5 - 36.5 g/dL    RDW 12.4 10.0 - 15.0 %    Platelet Count 308 150 - 450 10e9/L    % Neutrophils 49.2 %    % Lymphocytes 39.2 %    % Monocytes 9.6 %    % Eosinophils 1.7 %    % Basophils 0.3 %    Absolute Neutrophil 3.2 1.6 - 8.3 10e9/L    Absolute Lymphocytes 2.5 0.8 - 5.3 10e9/L    Absolute Monocytes 0.6 0.0 - 1.3 10e9/L     Absolute Eosinophils 0.1 0.0 - 0.7 10e9/L    Absolute Basophils 0.0 0.0 - 0.2 10e9/L    Diff Method Automated Method    Comprehensive metabolic panel (BMP + Alb, Alk Phos, ALT, AST, Total. Bili, TP)   Result Value Ref Range    Sodium 128 (L) 133 - 144 mmol/L    Potassium 3.9 3.4 - 5.3 mmol/L    Chloride 91 (L) 94 - 109 mmol/L    Carbon Dioxide 30 20 - 32 mmol/L    Anion Gap 7 3 - 14 mmol/L    Glucose 87 70 - 99 mg/dL    Urea Nitrogen 7 7 - 30 mg/dL    Creatinine 0.70 0.66 - 1.25 mg/dL    GFR Estimate >90 >60 mL/min/[1.73_m2]      Comment:      Non  GFR Calc  Starting 12/18/2018, serum creatinine based estimated GFR (eGFR) will be   calculated using the Chronic Kidney Disease Epidemiology Collaboration   (CKD-EPI) equation.      GFR Estimate If Black >90 >60 mL/min/[1.73_m2]      Comment:       GFR Calc  Starting 12/18/2018, serum creatinine based estimated GFR (eGFR) will be   calculated using the Chronic Kidney Disease Epidemiology Collaboration   (CKD-EPI) equation.      Calcium 9.1 8.5 - 10.1 mg/dL    Bilirubin Total 0.4 0.2 - 1.3 mg/dL    Albumin 5.0 3.4 - 5.0 g/dL    Protein Total 8.2 6.8 - 8.8 g/dL    Alkaline Phosphatase 69 40 - 150 U/L    ALT 44 0 - 70 U/L    AST 37 0 - 45 U/L   PSA, screen   Result Value Ref Range    PSA 0.46 0 - 4 ug/L      Comment:      Assay Method:  Chemiluminescence using Siemens Vista analyzer

## 2020-09-22 ENCOUNTER — TELEPHONE (OUTPATIENT)
Dept: FAMILY MEDICINE | Facility: CLINIC | Age: 45
End: 2020-09-22

## 2020-09-22 DIAGNOSIS — R10.13 EPIGASTRIC PAIN: ICD-10-CM

## 2020-09-22 DIAGNOSIS — R12 HEARTBURN: ICD-10-CM

## 2020-09-22 LAB
ALBUMIN SERPL-MCNC: 5 G/DL (ref 3.4–5)
ALP SERPL-CCNC: 69 U/L (ref 40–150)
ALT SERPL W P-5'-P-CCNC: 44 U/L (ref 0–70)
ANION GAP SERPL CALCULATED.3IONS-SCNC: 7 MMOL/L (ref 3–14)
AST SERPL W P-5'-P-CCNC: 37 U/L (ref 0–45)
BILIRUB SERPL-MCNC: 0.4 MG/DL (ref 0.2–1.3)
BUN SERPL-MCNC: 7 MG/DL (ref 7–30)
CALCIUM SERPL-MCNC: 9.1 MG/DL (ref 8.5–10.1)
CHLORIDE SERPL-SCNC: 91 MMOL/L (ref 94–109)
CO2 SERPL-SCNC: 30 MMOL/L (ref 20–32)
CREAT SERPL-MCNC: 0.7 MG/DL (ref 0.66–1.25)
GFR SERPL CREATININE-BSD FRML MDRD: >90 ML/MIN/{1.73_M2}
GLUCOSE SERPL-MCNC: 87 MG/DL (ref 70–99)
POTASSIUM SERPL-SCNC: 3.9 MMOL/L (ref 3.4–5.3)
PROT SERPL-MCNC: 8.2 G/DL (ref 6.8–8.8)
PSA SERPL-ACNC: 0.46 UG/L (ref 0–4)
SODIUM SERPL-SCNC: 128 MMOL/L (ref 133–144)

## 2020-09-22 RX ORDER — OMEPRAZOLE 40 MG/1
40 CAPSULE, DELAYED RELEASE ORAL
Qty: 180 CAPSULE | Refills: 1 | Status: SHIPPED | OUTPATIENT
Start: 2020-09-22 | End: 2021-02-23

## 2020-09-22 NOTE — TELEPHONE ENCOUNTER
.Reason for call:  Other   Pharmacy called regarding (reason for call): prescription  Additional comments: pt's insurance will only cover the cost for a 90 day supply of omeprazole. They received a 30 day supply and a 90 day supply script needs to be sent have the insurance cover the script    Phone number to reach patient:  Home number on file [phone] (home)    Best Time:  anytime    Can we leave a detailed message on this number?  YES    Travel screening: Negative

## 2020-09-22 NOTE — TELEPHONE ENCOUNTER
Prescription approved per [facility] Refill Protocol for 90 day supply.       Michael Scott RN, BSN, PHN

## 2020-09-25 ENCOUNTER — VIRTUAL VISIT (OUTPATIENT)
Dept: FAMILY MEDICINE | Facility: CLINIC | Age: 45
End: 2020-09-25
Payer: COMMERCIAL

## 2020-09-25 DIAGNOSIS — R73.03 PREDIABETES: ICD-10-CM

## 2020-09-25 DIAGNOSIS — M54.50 ACUTE BILATERAL LOW BACK PAIN WITHOUT SCIATICA: Primary | ICD-10-CM

## 2020-09-25 DIAGNOSIS — E87.1 HYPONATREMIA: ICD-10-CM

## 2020-09-25 DIAGNOSIS — I25.10 CORONARY ARTERY DISEASE INVOLVING NATIVE CORONARY ARTERY OF NATIVE HEART WITHOUT ANGINA PECTORIS: ICD-10-CM

## 2020-09-25 DIAGNOSIS — G47.00 INSOMNIA, UNSPECIFIED TYPE: ICD-10-CM

## 2020-09-25 PROCEDURE — 99214 OFFICE O/P EST MOD 30 MIN: CPT | Mod: TEL | Performed by: FAMILY MEDICINE

## 2020-09-25 RX ORDER — ZOLPIDEM TARTRATE 5 MG/1
2.5-5 TABLET ORAL
Qty: 10 TABLET | Refills: 0 | Status: SHIPPED | OUTPATIENT
Start: 2020-09-25 | End: 2020-10-06

## 2020-09-25 RX ORDER — SULINDAC 200 MG/1
200 TABLET ORAL 2 TIMES DAILY WITH MEALS
Qty: 60 TABLET | Refills: 0 | Status: SHIPPED | OUTPATIENT
Start: 2020-09-25 | End: 2021-07-13

## 2020-09-25 RX ORDER — NITROGLYCERIN 0.4 MG/1
TABLET SUBLINGUAL
Qty: 25 TABLET | Refills: 3 | Status: SHIPPED | OUTPATIENT
Start: 2020-09-25 | End: 2021-07-13

## 2020-09-25 NOTE — PROGRESS NOTES
"Micheal Husain is a 45 year old male who is being evaluated via a billable telephone visit.      The patient has been notified of following:     \"This telephone visit will be conducted via a call between you and your physician/provider. We have found that certain health care needs can be provided without the need for a physical exam.  This service lets us provide the care you need with a short phone conversation.  If a prescription is necessary we can send it directly to your pharmacy.  If lab work is needed we can place an order for that and you can then stop by our lab to have the test done at a later time.    Telephone visits are billed at different rates depending on your insurance coverage. During this emergency period, for some insurers they may be billed the same as an in-person visit.  Please reach out to your insurance provider with any questions.    If during the course of the call the physician/provider feels a telephone visit is not appropriate, you will not be charged for this service.\"    Patient has given verbal consent for Telephone visit?  Yes    What phone number would you like to be contacted at? [phone]    How would you like to obtain your AVS? Stephanie    Subjective     Micheal Husain is a 45 year old male who presents via phone visit today for the following health issues:    HPI    Back Pain  Onset/Duration: 9/18/20  Description:   Location of pain: low back bilateral  Character of pain: sharp  Pain radiation: none  New numbness or weakness in legs, not attributed to pain: no   Intensity: Currently 4/10  Progression of Symptoms: improving  History:   Specific cause: Sitting long time at work  Pain interferes with job: no  History of back problems: no prior back problems  Any previous MRI or X-rays: None  Sees a specialist for back pain: No  Alleviating factors:   Improved by: rest    Precipitating factors:  Worsened by: Sitting  Therapies tried and outcome: acetaminophen (Tylenol), " seen in  9/21 for dysuria and the back pain      Past medical, family, and social histories, medications, and allergies are reviewed and updated in Epic.  Allergies: Metoprolol    Review Of Systems:   Constitutional, HEENT, cardiovascular, pulmonary, gi and gu systems are negative, except as otherwise noted.    Objective:     There were no vitals taken for this visit.   healthy, alert and no distress  RESP: No cough, no audible wheezing, able to talk in full sentences.  PSYCH: Alert and oriented times 3; coherent speech, normal   rate and volume, able to articulate logical thoughts, able   to abstract reason, no tangential thoughts, no hallucinations   or delusions, and affect is normal and pleasant  The remainder of the exam cannot be completed due to this being a telephone visit.      Diagnostic Test Results:     Ref. Range 1/30/2020 19:27 6/14/2020 09:20 9/21/2020 20:08   Sodium Latest Ref Range: 133 - 144 mmol/L 132 (L)  128 (L)   Potassium Latest Ref Range: 3.4 - 5.3 mmol/L 4.0 3.8 3.9   Chloride Latest Ref Range: 94 - 109 mmol/L 94  91 (L)   Carbon Dioxide Latest Ref Range: 20 - 32 mmol/L 30  30   Urea Nitrogen Latest Ref Range: 7 - 30 mg/dL 9  7   Creatinine Latest Ref Range: 0.66 - 1.25 mg/dL 0.72 0.88 0.70   GFR Estimate Latest Ref Range: >60 mL/min/1.73_m2 >90 >90 >90   GFR Estimate If Black Latest Ref Range: >60 mL/min/1.73_m2 >90 >90 >90   Calcium Latest Ref Range: 8.5 - 10.1 mg/dL 9.1  9.1   Anion Gap Latest Ref Range: 3 - 14 mmol/L 8  7   Albumin Latest Ref Range: 3.4 - 5.0 g/dL 4.7  5.0   Protein Total Latest Ref Range: 6.8 - 8.8 g/dL 7.9  8.2   Bilirubin Total Latest Ref Range: 0.2 - 1.3 mg/dL 0.4  0.4   Alkaline Phosphatase Latest Ref Range: 40 - 150 U/L 75  69   ALT Latest Ref Range: 0 - 70 U/L 36  44   AST Latest Ref Range: 0 - 45 U/L 27  37   Hemoglobin A1C Latest Ref Range: 0 - 5.6 % 5.8 (H) 5.9 (H)        =====    ASSESSMENT/PLAN:  (M54.5) Acute bilateral low back pain without sciatica   (primary encounter diagnosis)  Comment: The patient reports his symptoms are improving, enough that he is not interested in checking for kidney stones at this time  Plan: sulindac (CLINORIL) 200 MG tablet, [facility] PT,         HAND, AND CHIROPRACTIC REFERRAL         Return in about 2 weeks (around 10/9/2020) for work with [employer] if back symptoms fail to resolve by then.      (G47.00) Insomnia, unspecified type  Comment: Possibly secondary to his back pain, is urinary symptoms (which are also improving), or anxiety (which I perceive during today's visit).  The patient would like to try a sleeping pill and a small quantity to see if he can reset his sleep pattern  Plan: zolpidem (AMBIEN) 5 MG tablet          (E87.1) Hyponatremia  Comment: Possibly due to his hydrochlorothiazide  Plan: Basic metabolic panel, Osmolality, Osmolality         urine        Stop hydrochlorothiazide.  Increase atenolol to 37.5 mg daily while the hydrochlorothiazide is held.  Recheck nonfasting sodium and other labs in 1 week.  Resume hydrochlorothiazide after blood is drawn.  Further recommendations based on the lab results.    (R73.03) Prediabetes  Comment: Patient wants another recheck  Plan: Hemoglobin A1c            (I25.10) Coronary artery disease involving native coronary artery of native heart without angina pectoris  Comment: Refill request (originally from cardiologist).  He says he has only a couple of left that are  or nearly   Plan: nitroGLYcerin (NITROSTAT) 0.4 MG sublingual         tablet             Phone call duration:  18 minutes    Lamin Parson MD

## 2020-09-26 LAB
BACTERIA SPEC CULT: NO GROWTH
Lab: NORMAL
SPECIMEN SOURCE: NORMAL

## 2020-10-03 DIAGNOSIS — R73.03 PREDIABETES: ICD-10-CM

## 2020-10-03 DIAGNOSIS — E87.1 HYPONATREMIA: ICD-10-CM

## 2020-10-03 DIAGNOSIS — E78.2 MIXED HYPERLIPIDEMIA: ICD-10-CM

## 2020-10-03 LAB
HBA1C MFR BLD: 5.7 % (ref 0–5.6)
OSMOLALITY SERPL: 285 MMOL/KG (ref 275–295)
OSMOLALITY UR: 262 MMOL/KG (ref 100–1200)

## 2020-10-03 PROCEDURE — 80061 LIPID PANEL: CPT | Performed by: INTERNAL MEDICINE

## 2020-10-03 PROCEDURE — 80048 BASIC METABOLIC PNL TOTAL CA: CPT | Performed by: FAMILY MEDICINE

## 2020-10-03 PROCEDURE — 83930 ASSAY OF BLOOD OSMOLALITY: CPT | Performed by: FAMILY MEDICINE

## 2020-10-03 PROCEDURE — 83036 HEMOGLOBIN GLYCOSYLATED A1C: CPT | Performed by: FAMILY MEDICINE

## 2020-10-03 PROCEDURE — 83935 ASSAY OF URINE OSMOLALITY: CPT | Mod: 90 | Performed by: FAMILY MEDICINE

## 2020-10-03 PROCEDURE — 99000 SPECIMEN HANDLING OFFICE-LAB: CPT | Performed by: FAMILY MEDICINE

## 2020-10-03 PROCEDURE — 36415 COLL VENOUS BLD VENIPUNCTURE: CPT | Performed by: FAMILY MEDICINE

## 2020-10-05 ENCOUNTER — TELEPHONE (OUTPATIENT)
Dept: FAMILY MEDICINE | Facility: CLINIC | Age: 45
End: 2020-10-05

## 2020-10-05 LAB
ANION GAP SERPL CALCULATED.3IONS-SCNC: 7 MMOL/L (ref 3–14)
BUN SERPL-MCNC: 8 MG/DL (ref 7–30)
CALCIUM SERPL-MCNC: 8.6 MG/DL (ref 8.5–10.1)
CHLORIDE SERPL-SCNC: 100 MMOL/L (ref 94–109)
CO2 SERPL-SCNC: 27 MMOL/L (ref 20–32)
CREAT SERPL-MCNC: 0.77 MG/DL (ref 0.66–1.25)
GFR SERPL CREATININE-BSD FRML MDRD: >90 ML/MIN/{1.73_M2}
GLUCOSE SERPL-MCNC: 80 MG/DL (ref 70–99)
POTASSIUM SERPL-SCNC: 4 MMOL/L (ref 3.4–5.3)
SODIUM SERPL-SCNC: 134 MMOL/L (ref 133–144)

## 2020-10-05 NOTE — TELEPHONE ENCOUNTER
Reason for Call:  Other appointment    Detailed comments: Pt received a message this afternoon regarding his appointment and was returning a phone call and would like a phone call back to address for Pt is available to speak.    Phone Number Patient can be reached at: Home number on file [phone] (home)    Best Time: anytime    Can we leave a detailed message on this number? YES    Call taken on 10/5/2020 at 3:13 PM by Werner Childers

## 2020-10-06 ENCOUNTER — VIRTUAL VISIT (OUTPATIENT)
Dept: FAMILY MEDICINE | Facility: CLINIC | Age: 45
End: 2020-10-06
Payer: COMMERCIAL

## 2020-10-06 DIAGNOSIS — E87.1 HYPONATREMIA: ICD-10-CM

## 2020-10-06 DIAGNOSIS — G47.00 INSOMNIA, UNSPECIFIED TYPE: ICD-10-CM

## 2020-10-06 DIAGNOSIS — I10 ESSENTIAL HYPERTENSION WITH GOAL BLOOD PRESSURE LESS THAN 140/90: Primary | ICD-10-CM

## 2020-10-06 DIAGNOSIS — R73.03 PREDIABETES: ICD-10-CM

## 2020-10-06 LAB
CHOLEST SERPL-MCNC: 117 MG/DL
HDLC SERPL-MCNC: 45 MG/DL
LDLC SERPL CALC-MCNC: 48 MG/DL
NONHDLC SERPL-MCNC: 72 MG/DL
TRIGL SERPL-MCNC: 120 MG/DL

## 2020-10-06 PROCEDURE — 99214 OFFICE O/P EST MOD 30 MIN: CPT | Mod: TEL | Performed by: FAMILY MEDICINE

## 2020-10-06 RX ORDER — ATENOLOL 25 MG/1
37.5 TABLET ORAL DAILY
Qty: 135 TABLET | Refills: 3 | Status: SHIPPED | OUTPATIENT
Start: 2020-10-06 | End: 2020-10-19

## 2020-10-06 RX ORDER — ZOLPIDEM TARTRATE 5 MG/1
2.5-5 TABLET ORAL
Qty: 10 TABLET | Refills: 5 | Status: SHIPPED | OUTPATIENT
Start: 2020-10-06 | End: 2021-02-23

## 2020-10-06 NOTE — PROGRESS NOTES
"Micheal Husain is a 45 year old male who is being evaluated via a billable telephone visit.      The patient has been notified of following:     \"This telephone visit will be conducted via a call between you and your physician. We have found that certain health care needs can be provided without the need for a physical exam.  This service lets us provide the care you need with a short phone conversation.  If a prescription is necessary we can send it directly to your pharmacy.  If lab work is needed we can place an order for that and you can then stop by our lab to have the test done at a later time.    Telephone visits are billed at different rates depending on your insurance coverage. During this emergency period, for some insurers they may be billed the same as an in-person visit.  Please reach out to your insurance provider with any questions.  If during the course of the call the physician feels a telephone visit is not appropriate, you will not be charged for this service.\"    Patient has given verbal consent for Telephone visit?  Yes    What phone number would you like to be contacted at? [phone]    How would you like to obtain your AVS? MyChart      SUBJECTIVE:    Micheal Husain is a 45 year old male who prevents via telephone visit today for the following issue(s):    HPI:    Hypertension Follow-up      The day after he stopped the HCTZ, he felt his (drained) energy return    Do you check your blood pressure regularly outside of the clinic? Yes BID. 123/76 today    Are you following a low salt diet? Yes    Are your blood pressures ever more than 140 on the top number (systolic) OR more   than 90 on the bottom number (diastolic), for example 140/90? No       Past medical, family, and social histories, medications, and allergies are reviewed and updated in Epic.  Allergies: Hydrochlorothiazide and Metoprolol      Objective:  Vitals - Patient Reported  Systolic (Patient Reported): 123  Diastolic " (Patient Reported): 76      There were no vitals taken for this visit., since this is a telephone visit.  healthy, alert and no distress  RESP: No cough, no audible wheezing, able to talk in full sentences.  PSYCH: Alert and oriented times 3; coherent speech, normal   rate and volume, able to articulate logical thoughts, able   to abstract reason, no tangential thoughts, no hallucinations   or delusions, and affect is normal and pleasant  The remainder of the exam cannot be completed due to this being a telephone visit.      Diagnostic Test Results:     Ref. Range 10/3/2020    Sodium Latest Ref Range: 133 - 144 mmol/L 134   Potassium Latest Ref Range: 3.4 - 5.3 mmol/L 4.0   Chloride Latest Ref Range: 94 - 109 mmol/L 100   Carbon Dioxide Latest Ref Range: 20 - 32 mmol/L 27   Urea Nitrogen Latest Ref Range: 7 - 30 mg/dL 8   Creatinine Latest Ref Range: 0.66 - 1.25 mg/dL 0.77   GFR Estimate Latest Ref Range: >60 mL/min/1.73_m2 >90   GFR Estimate If Black Latest Ref Range: >60 mL/min/1.73_m2 >90   Calcium Latest Ref Range: 8.5 - 10.1 mg/dL 8.6   Anion Gap Latest Ref Range: 3 - 14 mmol/L 7   Hemoglobin A1C Latest Ref Range: 0 - 5.6 % 5.7 (H)   Osmolality Latest Ref Range: 275 - 295 mmol/kg 285   Urine Osmolality Latest Ref Range: 100 - 1,200 mmol/kg 262   Glucose Latest Ref Range: 70 - 99 mg/dL 80     Lab Results   Component Value Date    A1C 5.7 10/03/2020    A1C 5.9 06/14/2020    A1C 5.8 01/30/2020    A1C 5.6 09/06/2019    A1C 6.0 04/02/2019          =====    ASSESSMENT/PLAN:  (I10) Essential hypertension with goal blood pressure less than 140/90  (primary encounter diagnosis)  Comment: Well-controlled on just atenolol at 1.5 tablets/day  Plan: atenolol (TENORMIN) 25 MG tablet        Prescription directions and quantity updated to reflect how he is taking it now. Return in about 4 months (around 1/30/2021) for full physical, blood pressure check, lab tests.      (E87.1) Hyponatremia  Comment: Resolved after  "discontinuing hydrochlorothiazide.  Interestingly, the patient also noted that the sense of feeling \"drained\", which is been present for some time, was lifted after about a day after he stopped the hydrochlorothiazide.  Plan: Discontinue hydrochlorothiazide indefinitely.  Allergy list updated.    (G47.00) Insomnia, unspecified type  Comment: Patient states that he finds this medication very useful.  There are no residual effects when he wakes up in the morning, and he requests to have about 10 tablets/month available for continued occasional use.  Plan: zolpidem (AMBIEN) 5 MG tablet          (R73.03) Prediabetes  Comment: Mild improvement in his HgbA1c since last check  Plan: Monitor periodically    Phone call duration:  16 minutes    Lamin Parson MD    "

## 2020-10-12 ENCOUNTER — TELEPHONE (OUTPATIENT)
Dept: NEPHROLOGY | Facility: CLINIC | Age: 45
End: 2020-10-12

## 2020-10-12 NOTE — TELEPHONE ENCOUNTER
Left message for pt to return call regarding appt with Dr. Workman on 10/19. Due to COVID pt is unable to come to clinic for in person visit. Dr. Workman would like to convert the appt to a video visit and keep their existing date and time for the appt.     Veronica Williamson CMA......October 12, 2020     11:26 AM

## 2020-10-19 ENCOUNTER — VIRTUAL VISIT (OUTPATIENT)
Dept: CARDIOLOGY | Facility: CLINIC | Age: 45
End: 2020-10-19
Payer: COMMERCIAL

## 2020-10-19 DIAGNOSIS — I10 ESSENTIAL HYPERTENSION WITH GOAL BLOOD PRESSURE LESS THAN 140/90: ICD-10-CM

## 2020-10-19 DIAGNOSIS — E78.2 MIXED HYPERLIPIDEMIA: ICD-10-CM

## 2020-10-19 DIAGNOSIS — I25.10 CORONARY ARTERY DISEASE INVOLVING NATIVE CORONARY ARTERY OF NATIVE HEART WITHOUT ANGINA PECTORIS: Primary | ICD-10-CM

## 2020-10-19 PROCEDURE — 99214 OFFICE O/P EST MOD 30 MIN: CPT | Mod: GT | Performed by: INTERNAL MEDICINE

## 2020-10-19 RX ORDER — ATENOLOL 25 MG/1
37.5 TABLET ORAL DAILY
Qty: 135 TABLET | Refills: 3 | Status: SHIPPED | OUTPATIENT
Start: 2020-10-19 | End: 2021-10-12

## 2020-10-19 RX ORDER — ATORVASTATIN CALCIUM 40 MG/1
40 TABLET, FILM COATED ORAL DAILY
Qty: 90 TABLET | Refills: 3 | Status: SHIPPED | OUTPATIENT
Start: 2020-10-19 | End: 2020-10-19

## 2020-10-19 RX ORDER — CLOPIDOGREL BISULFATE 75 MG/1
75 TABLET ORAL DAILY
Qty: 30 TABLET | Refills: 1 | Status: SHIPPED | OUTPATIENT
Start: 2020-10-19 | End: 2021-02-23

## 2020-10-19 RX ORDER — ATORVASTATIN CALCIUM 40 MG/1
40 TABLET, FILM COATED ORAL DAILY
Qty: 90 TABLET | Refills: 3 | Status: SHIPPED | OUTPATIENT
Start: 2020-10-19 | End: 2021-07-13 | Stop reason: DRUGHIGH

## 2020-10-19 NOTE — PATIENT INSTRUCTIONS
It was a pleasure to see you in the cardiology clinic today.    If you have any questions, you can reach my nurse, Marianne Riggs, at [phone].     Note the new medications: None    Stop the following medications: Assuming no further chest discomfort, you may stop the Clopidogrel in one month.      The results from today include: None    Tests ordered today: None    I would like you to follow up with Dr. Workman in one year.    Sincerely,      Yayo Workman MD     [facility]

## 2020-10-19 NOTE — PROGRESS NOTES
[facility]    Referring Provider:  No ref. provider found  Primary Care Provider:   No Ref-Primary, Physician  Indication for Consultation:  CAD    The patient has been notified of following:     \"This video visit will be conducted via a call between you and your physician/provider. We have found that certain health care needs can be provided without the need for an in-person physical exam.  This service lets us provide the care you need with a video conversation.  If a prescription is necessary we can send it directly to your pharmacy.  If lab work is needed we can place an order for that and you can then stop by our lab to have the test done at a later time.    Video visits are billed at different rates depending on your insurance coverage.  Please reach out to your insurance provider with any questions.    If during the course of the call the physician/provider feels a video visit is not appropriate, you will not be charged for this service.\"    Patient has given verbal consent for Video visit? Yes    Video-Visit Details  Type of service:  Video Visit  Video start time: 4:34 PM  Video end time: 4:57 PM  Total video time: 23 min  Originating Location (pt. Location): Home  Distant Location (provider location):  Provider's home  Mode of Communication: Video Conference via [employer]      HPI: Micheal Husain is a 45 year old male being seen today for evaluation of CAD.   The patient's risk factor profile is: (+) HTN, Type II DM, (+) hypercholesterolemia, (+)  prior  Social tobacco use [quit Oct 2019], (+) fam Hx premature CAD [paternal, fraternal].    The patient had a negative stress echo (3/19/2018). CT angiography performed 10/3/19 demonstrated a coronary calcium score of 146 with calcium noted in the LAD and circumflex. LAD FFR-CT distal to proximal-mid LAD stenosis was 0.89. There was noted to be severe stenosis in the distal left circumflex.  The patient had coronary " angiography (10/23/19) at [facility] and underwent PCI of a  in the distal LCx.  There was minimal disease in the RCA and 50% narrowing in the mid LAD.       He presented to [facility] ER with chest discomfort, TWI in III and aVF, no ST shift, no Q waves, and underwent repeat coronary angiography (11/15/19).   The stents in the distal LCx were widely patent.  There was an 80% narrowing in a small caliber OM1 and disease in the distal segment of the Rt PDA, neither of which were amenable to PCI.  He was continued on medical therapy.      I saw the patient last in Dec 2019, shortly after he had the coronary angiogram.  At that time he was symptom free.    The patient completed cardiac rehabilitation after his PCI.      Recently, he was noted to have hyponatremia.  His hydrochlorothiazide was stopped and his Atenolol was increased from 25 to 37.5 mg every day.    He had been doing well until this past week when he started to notice recurrent chest discomfort.  The discomfort appears to be in the upper left sided adjacent to the shoulder.  It was brought on by raking leaves and cleaning up the outside of his home.  He notes the discomfort is worsened by left arm movement.  It lasted for several hours and improved over the next 24 hours.  He did not have any other cardiopulmonary symptoms with this activity.    He had not  has not had recurrent chest discomfort, dyspnea, PND, orthopnea, pedal edema, palpitations, lightheadedness, and syncope.  He has not had to use any additional SL NTG.    The patient has no history of CHF, arrhythmia, or valvular heart disease.  The patient has no Hx of PAD or cerebrovascular disease.     PAST MEDICAL HISTORY:  Past Medical History:   Diagnosis Date     Hyperlipidemia      Hypertension      Pre-diabetes        CURRENT MEDICATIONS:  Current Outpatient Medications   Medication Sig     aspirin 81 MG EC tablet Take 1 tablet (81 mg) by mouth daily     atenolol (TENORMIN) 25 MG  tablet Take 1.5 tablets (37.5 mg) by mouth daily for blood pressure and heart.     atorvastatin (LIPITOR) 40 MG tablet Take 1 tablet (40 mg) by mouth daily     clopidogrel (PLAVIX) 75 MG tablet 75 mg daily     nitroGLYcerin (NITROSTAT) 0.4 MG sublingual tablet For chest pain place 1 tablet under the tongue every 5 minutes for 3 doses. If symptoms persist 5 minutes after 1st dose call 911.     omeprazole (PRILOSEC) 40 MG DR capsule Take 1 capsule (40 mg) by mouth 2 times daily (before meals) (Patient taking differently: Take 40 mg by mouth as needed )     zolpidem (AMBIEN) 5 MG tablet Take 0.5-1 tablets (2.5-5 mg) by mouth nightly as needed for sleep (take right at bedtime) . This is a 1-month supply.     sulindac (CLINORIL) 200 MG tablet Take 1 tablet (200 mg) by mouth 2 times daily (with meals) as needed for back pain. (Patient not taking: Reported on 10/19/2020)     No current facility-administered medications for this visit.        PAST SURGICAL HISTORY:  Past Surgical History:   Procedure Laterality Date     CARDIAC CATHERIZATION  10/23/2019    PTCA-stent       ALLERGIES  Hydrochlorothiazide and Metoprolol    FAMILY HX:  Family History   Problem Relation Age of Onset     Cerebrovascular Disease Father      Heart Disease Father              Kidney failure Mother              Breast Cancer Sister      Coronary Stenting Brother 47     Coronary Stenting Brother 47     Diabetes No family hx of        SOCIAL HX:  Social History     Socioeconomic History     Marital status:      Spouse name: Not on file     Number of children: Not on file     Years of education: Not on file     Highest education level: Not on file   Occupational History     Not on file   Social Needs     Financial resource strain: Not on file     Food insecurity:     Worry: Not on file     Inability: Not on file     Transportation needs:     Medical: Not on file     Non-medical: Not on file   Tobacco Use     Smoking status: Former Smoker      Smokeless tobacco: Never Used   Substance and Sexual Activity     Alcohol use: Not on file     Drug use: Not on file     Sexual activity: Not on file   Lifestyle     Physical activity:     Days per week: Not on file     Minutes per session: Not on file     Stress: Not on file   Relationships     Social connections:     Talks on phone: Not on file     Gets together: Not on file     Attends Jainism service: Not on file     Active member of club or organization: Not on file     Attends meetings of clubs or organizations: Not on file     Relationship status: Not on file     Intimate partner violence:     Fear of current or ex partner: Not on file     Emotionally abused: Not on file     Physically abused: Not on file     Forced sexual activity: Not on file   Other Topics Concern     Not on file   Social History Narrative     Not on file       ROS:  Constitutional: No fever, chills, or sweats. No weight gain/loss.   HEENT: No visual disturbance, ear ache, epistaxis, sore throat.   Allergies/Immunologic: Negative.   Respiratory: No cough, hemoptysis.   Cardiovascular: As per HPI.   GI: No nausea, vomiting, hematemesis, melena, or hematochezia.   : No urinary frequency, dysuria, or hematuria.   Integument: No rash.   Psychiatric: No anxiety / depression.   Neuro: No speech disturbance, focal sensory or motor deficit.   Endocrinology: No polyuria / polyphagia.   Musculoskeletal: No myalgia.    VITAL SIGNS:  There were no vitals taken for this visit.  There is no height or weight on file to calculate BMI.  Wt Readings from Last 2 Encounters:   09/21/20 64.2 kg (141 lb 9.6 oz)   06/12/20 65.8 kg (145 lb)       PHYSICAL EXAM  GENERAL: Healthy, alert and no distress  EYES: Eyes grossly normal to inspection.  No discharge or erythema, or obvious scleral/conjunctival abnormalities.  RESP: No audible wheeze, cough, or visible cyanosis.  No visible retractions or increased work of breathing.    SKIN: Visible skin clear. No  significant rash, abnormal pigmentation or lesions.  NEURO: Cranial nerves grossly intact.  Mentation and speech appropriate for age.  PSYCH: Mentation appears normal, affect normal/bright, judgement and insight intact, normal speech and appearance well-groomed.  The rest of a comprehensive physical examination is deferred due to PHE (public health emergency) video visit restrictions.    LABS  Recent Labs   Lab Test 20  0920   WBC 6.5 6.6   HGB 13.4 13.4   HCT 39.2* 40.6    277     Recent Labs   Lab Test 10/03/20  1115 09/21/20  2008    128*   POTASSIUM 4.0 3.9   CHLORIDE 100 91*   CO2 27 30   BUN 8 7   CR 0.77 0.70   GFRESTIMATED >90 >90     Recent Labs   Lab Test 10/03/20  1115 06/14/20  0920   CHOL 117 132   HDL 45 40   LDL 48 69   TRIG 120 114       EK19  Normal ECG.    EK/15/19  [[facility]]  NSR,  No Q waves.  No ST shift.      ECHO: None    CARDIAC MRI: None    CORONARY CTA: 19  Single vessel coronary artery disease with severe stenosis within distal LCX     FINDINGS:  1.  2 vessel coronary disease with intermediate stenosis within proximal-mid LAD and severe stenosis of the distal LCX.   2.  LAD: FFR-CT distal to proximal-mid LAD stenosis is 0.89 or non-significant.   3.  LCX: Severe stenosis in distal LCX and FFR-CT not performed.   4.  RCA: No stenosis >30%; FFR-CT analysis was not performed.     DOMINANCE: Right dominant system.   Normal coronary origins and course.     LEFT MAIN:   The left main arises normally from the left coronary cusp and is  widely patent without any detectable stenosis.      LEFT ANTERIOR DESCENDING:   There is a mixed calcified and non-calcified plaque in the proximal-mid LAD with moderate stenosis (40-60%). The rest of the LAD and diagonal system is widely patent without any detectable stenosis.      LEFT CIRCUMFLEX:   The circumflex is the non-dominant artery which gives rise to two large OM branches. Distal to  the takeoff of the 2nd OM branch, there is a severe obstruction (non-calcified plaque) with sub-total occlusion of the circumflex. Small OM1 with small proximal plaque with mild 30-50% stenosis proximally. The larger OM2 branch is free of disease.     RIGHT CORONARY ARTERY:   The right coronary artery and its major branches are widely patent without any detectable atherosclerosis or stenosis.     ADDITIONAL FINDINGS:   The proximal ascending aorta is normal in size.   Normal pulmonary venous anatomy with all four pulmonary veins draining  into the left atrium.    The left atrial appendage is free of thrombus.  There is no left ventricular mass or thrombus.   Normal pericardial thickness. There is no pericardial effusion.    EXERCISE STRESS ECHO:  3/9/18  Conclusion:  Negative exercise echocardiogram at a diagnostic level of peak stress (92% MPHR).     Patient developed no chest pain.  Test stopped due to target achieved.  Normal functional capacity.  No regional wall motion abnormalities at rest or with stress.  Normal resting LV function with EF of approximately 60-65%; normal response to exercise with increase to approximately 70-75%.  Hypertensive blood pressure response to exercise.  No ECG evidence of ischemia.  No significant valvular disease noted on routine screening color flow Doppler  and pulsed Doppler examination.  The ascending aortic segment is normal.    CARDIAC CATH:  10/23/19  FINAL DIAGNOSIS  1.   (Chronic Total Occlusion) of distal LCx  2.  Successful PTCA and drug-eluting stent to distal circumflex  with 2.75x12 and 2.5x12 mm Synergy  3.  Severe coronary artery atherosclerosis    CORONARY DIAGNOSTIC SUMMARY  Coronary artery dominance is right.   The left main coronary artery is 10% obstructed by a discrete lesion.   The middle left anterior descending artery is 50% obstructed by a tubular lesion.   The distal circumflex artery is 100% obstructed by a discrete lesion and 70% obstructed by a  discrete lesion.   The first obtuse marginal is 70% obstructed by a discrete lesion.   The proximal right coronary artery is 10% obstructed by a discrete lesion.   CORONARY INTERVENTION SUMMARY  Successful intervention of the Distal Circumflex Artery. The preintervention stenosis was 100%. The post intervention stenosis was 0%. Devices used include Stent and PTCA.     CARDIAC CATH: 11/15/2019  [[facility]]  LMCA: ---  LAD: 40% mid  LCx: Previously placed stents widely patent  OM1: Small caliber, 80%, too small to intervene upon  RCA: Disease in small caliber distal Rt PDA    ASSESSMENT AND PLAN:   1. CAD, Atypical Chest Pain.  Mr. Husain has single vessel CAD with patent stents in the distal LCx and an 80% stenosis in a small OM1 as well as disease in the distal Rt PDA [small caliber] and a 40% mid LAD. Asymptomatic.  CV exam unremarkable. ECG today has reverted to normal (resolution of inferior TWI).  He has normal LV function by the most recent stress test.  He had hyperdynamic LV function at the time of his last ECHO (March 2018).  He is on ASA, Plavix, beta blocker, and a statin. He completed cardiac rehabilitation.  He has no intention of resuming tobacco.  Since he has had recent chest discomfort, I would like to keep him on Plavix for the next month to ensure the chest pain was musculoskeletal and not angina.  If he is not have CP in a month, stop Plavix.  He does not require Imdur but can take SL NTG as needed.    2. HTN.  Well controlled.      3. Hypercholesterolemia.  Continue Lipitor 40 mg every day.  LDL 48 mg/dl in Oct 2020.    4. Type II Diabetes.  Continue Metformin.  F/U with PCP.    5. Tobacco cessation.  Patient has no intention of resuming tobacco use.  I spent 5 minutes discussing this topic with him.    FOLLOW UP:  1 year      Yayo Workman MD    Divisions of Cardiology  [facility]  Patient Care Team:  No Ref-Primary, Physician as PCP -  Lamin Ash MD as Assigned PCP  Micheal Margaret Husain is a 45 year old male who is being evaluated via a billable video visit.

## 2020-10-23 ENCOUNTER — TELEPHONE (OUTPATIENT)
Dept: CARDIOLOGY | Facility: CLINIC | Age: 45
End: 2020-10-23

## 2020-10-23 NOTE — TELEPHONE ENCOUNTER
Instructions    Marianne, please instruct the patient on the use of SL NTG.  He had recent chest discomfort that was atypical and I am extending his Plavix for an additional month.  If he gets recurrent exertional chest discomfort, he should try the SL NTG.  If not recurrent chest discomfort, he can stop the Plavix in one month.  Please remove this message after reviewing it.     Called patient. Patient  instructed  regarding the use of sublingual nitroglycerin for chest pain/shortness of breath. Patient demonstrates understanding of this information and has agreed to call with further questions or concerns.    DBaCAREN sevilla'

## 2020-11-12 DIAGNOSIS — I25.10 CORONARY ARTERY DISEASE INVOLVING NATIVE CORONARY ARTERY OF NATIVE HEART WITHOUT ANGINA PECTORIS: ICD-10-CM

## 2020-11-16 RX ORDER — CLOPIDOGREL BISULFATE 75 MG/1
TABLET ORAL
Qty: 30 TABLET | Refills: 1 | OUTPATIENT
Start: 2020-11-16

## 2021-02-23 ENCOUNTER — ANCILLARY PROCEDURE (OUTPATIENT)
Dept: GENERAL RADIOLOGY | Facility: CLINIC | Age: 46
End: 2021-02-23
Attending: FAMILY MEDICINE
Payer: COMMERCIAL

## 2021-02-23 ENCOUNTER — OFFICE VISIT (OUTPATIENT)
Dept: FAMILY MEDICINE | Facility: CLINIC | Age: 46
End: 2021-02-23
Payer: COMMERCIAL

## 2021-02-23 VITALS
HEIGHT: 67 IN | BODY MASS INDEX: 22.82 KG/M2 | OXYGEN SATURATION: 99 % | WEIGHT: 145.38 LBS | TEMPERATURE: 98.2 F | DIASTOLIC BLOOD PRESSURE: 76 MMHG | SYSTOLIC BLOOD PRESSURE: 132 MMHG | HEART RATE: 65 BPM

## 2021-02-23 DIAGNOSIS — R10.13 EPIGASTRIC PAIN: ICD-10-CM

## 2021-02-23 DIAGNOSIS — E78.5 HYPERLIPIDEMIA LDL GOAL <70: ICD-10-CM

## 2021-02-23 DIAGNOSIS — R10.9 FLANK PAIN: ICD-10-CM

## 2021-02-23 DIAGNOSIS — I12.9 HYPERTENSIVE KIDNEY DISEASE, STAGE 1: ICD-10-CM

## 2021-02-23 DIAGNOSIS — R73.03 PREDIABETES: ICD-10-CM

## 2021-02-23 DIAGNOSIS — R30.0 DYSURIA: ICD-10-CM

## 2021-02-23 DIAGNOSIS — M54.42 ACUTE BILATERAL LOW BACK PAIN WITH LEFT-SIDED SCIATICA: ICD-10-CM

## 2021-02-23 DIAGNOSIS — Z11.59 NEED FOR HEPATITIS C SCREENING TEST: ICD-10-CM

## 2021-02-23 DIAGNOSIS — R12 HEARTBURN: ICD-10-CM

## 2021-02-23 DIAGNOSIS — M54.42 ACUTE BILATERAL LOW BACK PAIN WITH LEFT-SIDED SCIATICA: Primary | ICD-10-CM

## 2021-02-23 DIAGNOSIS — G47.00 INSOMNIA, UNSPECIFIED TYPE: ICD-10-CM

## 2021-02-23 DIAGNOSIS — I25.10 CORONARY ARTERY DISEASE INVOLVING NATIVE CORONARY ARTERY OF NATIVE HEART WITHOUT ANGINA PECTORIS: ICD-10-CM

## 2021-02-23 DIAGNOSIS — N18.1 HYPERTENSIVE KIDNEY DISEASE, STAGE 1: ICD-10-CM

## 2021-02-23 LAB
ALBUMIN UR-MCNC: NEGATIVE MG/DL
APPEARANCE UR: CLEAR
BILIRUB UR QL STRIP: NEGATIVE
CHOLEST SERPL-MCNC: 163 MG/DL
COLOR UR AUTO: YELLOW
CREAT SERPL-MCNC: 0.79 MG/DL (ref 0.66–1.25)
GFR SERPL CREATININE-BSD FRML MDRD: >90 ML/MIN/{1.73_M2}
GLUCOSE SERPL-MCNC: 97 MG/DL (ref 70–99)
GLUCOSE UR STRIP-MCNC: NEGATIVE MG/DL
HCV AB SERPL QL IA: NONREACTIVE
HDLC SERPL-MCNC: 50 MG/DL
HGB UR QL STRIP: NEGATIVE
KETONES UR STRIP-MCNC: NEGATIVE MG/DL
LDLC SERPL CALC-MCNC: 87 MG/DL
LEUKOCYTE ESTERASE UR QL STRIP: NEGATIVE
NITRATE UR QL: NEGATIVE
NONHDLC SERPL-MCNC: 113 MG/DL
PH UR STRIP: 8 PH (ref 5–7)
SOURCE: ABNORMAL
SP GR UR STRIP: 1.01 (ref 1–1.03)
TRIGL SERPL-MCNC: 131 MG/DL
UROBILINOGEN UR STRIP-ACNC: 0.2 EU/DL (ref 0.2–1)

## 2021-02-23 PROCEDURE — 72100 X-RAY EXAM L-S SPINE 2/3 VWS: CPT | Performed by: RADIOLOGY

## 2021-02-23 PROCEDURE — 86803 HEPATITIS C AB TEST: CPT | Performed by: FAMILY MEDICINE

## 2021-02-23 PROCEDURE — 74019 RADEX ABDOMEN 2 VIEWS: CPT | Performed by: RADIOLOGY

## 2021-02-23 PROCEDURE — 82565 ASSAY OF CREATININE: CPT | Performed by: FAMILY MEDICINE

## 2021-02-23 PROCEDURE — 82947 ASSAY GLUCOSE BLOOD QUANT: CPT | Performed by: FAMILY MEDICINE

## 2021-02-23 PROCEDURE — 36415 COLL VENOUS BLD VENIPUNCTURE: CPT | Performed by: FAMILY MEDICINE

## 2021-02-23 PROCEDURE — 81003 URINALYSIS AUTO W/O SCOPE: CPT | Performed by: FAMILY MEDICINE

## 2021-02-23 PROCEDURE — 80061 LIPID PANEL: CPT | Performed by: FAMILY MEDICINE

## 2021-02-23 PROCEDURE — 99214 OFFICE O/P EST MOD 30 MIN: CPT | Performed by: FAMILY MEDICINE

## 2021-02-23 RX ORDER — OMEPRAZOLE 40 MG/1
40 CAPSULE, DELAYED RELEASE ORAL
Qty: 180 CAPSULE | Refills: 1 | Status: SHIPPED | OUTPATIENT
Start: 2021-02-23 | End: 2021-07-13

## 2021-02-23 RX ORDER — POLYETHYLENE GLYCOL 3350 17 G/17G
POWDER, FOR SOLUTION ORAL
Status: CANCELLED | OUTPATIENT
Start: 2021-02-23

## 2021-02-23 RX ORDER — POLYETHYLENE GLYCOL 3350 17 G/17G
POWDER, FOR SOLUTION ORAL
COMMUNITY
Start: 2019-10-24 | End: 2021-07-13

## 2021-02-23 RX ORDER — ZOLPIDEM TARTRATE 5 MG/1
2.5-5 TABLET ORAL
Qty: 10 TABLET | Refills: 5 | Status: SHIPPED | OUTPATIENT
Start: 2021-02-23 | End: 2021-07-13

## 2021-02-23 ASSESSMENT — MIFFLIN-ST. JEOR: SCORE: 1495.11

## 2021-02-23 ASSESSMENT — PAIN SCALES - GENERAL: PAINLEVEL: MILD PAIN (3)

## 2021-02-23 NOTE — PROGRESS NOTES
Assessment & Plan     Acute bilateral low back pain with left-sided sciatica  The patient expresses his concern that his discomfort in the back and trunk are due to any of the various more serious or complex medical problems than the simple musculoskeletal back pain that I suspect that he has.  It may be that he will need the reassurance of a specialist  - XR Lumbar Spine 2/3 Views; Future  - [facility], AND CHIROPRACTIC REFERRAL; Future  - Orthopedic & Spine  Referral; Future  Encouraged him to work with physical therapy, and to follow-up with Ortho if there is no significant improvement after 3+ weeks     Flank pain  See above  - *UA reflex to Microscopic and Culture ([facility] (except [place] and [place])  - Creatinine  - XR Lumbar Spine 2/3 Views; Future  - XR KUB; Future  - [facility] CH[facility]RACT[facility] REFERRAL; Future  - Orthopedic & Spine  Referral; Future    Dysuria    - *UA reflex to Microscopic and Culture ([facility] (except [place] and [place])  - Creatinine  - XR KUB; Future  - UROLOGY ADULT REFERRAL; Future    Epigastric pain    Heartburn  Refill request  - omeprazole (PRILOSEC) 40 MG DR capsule; Take 1 capsule (40 mg) by mouth 2 times daily (before meals)    Insomnia, unspecified type    - zolpidem (AMBIEN) 5 MG tablet; Take 0.5-1 tablets (2.5-5 mg) by mouth nightly as needed for sleep (take right at bedtime) . This is a 1-month supply.    Coronary artery disease involving native coronary artery of native heart without angina pectoris    Hyperlipidemia LDL goal <70  Clinically stable now.  He is currently on atorvastatin 40 mg.  Since he is having labs done anyway, I want these updated for future refill request  - Lipid panel reflex to direct LDL Non-fasting    Hypertensive kidney disease, stage 1  Normal blood pressure today  - Creatinine    Prediabetes  Lab update  - Glucose    Need for hepatitis C screening test  indications for  "screening discussed with the patient   - Hepatitis C Screen Reflex to HCV RNA Quant and Genotype                 No follow-ups on file.    Lamin Parson MD  [facility] ELVER Burton is a 45 year old who presents for the following health issues     HPI       Back Pain  Onset/Duration: 2/16/21  Description:   Location of pain: low back bilateral and waist bilateral  Character of pain: stabbing and burning  Pain radiation: radiates into the left buttock > right buttock. Also intermittent pain down left posterior thigh down to calf  New numbness or weakness in legs, not attributed to pain:   Intensity: Currently 3/10, At its worst 6-7/10  Progression of Symptoms: same  History:   Specific cause: none  Pain interferes with job:  no   History of back problems: recurrent self limited episodes of low back pain in the past  Any previous MRI or X-rays: None  Sees a specialist for back pain: No  Alleviating factors:   Improved by: acetaminophen (Tylenol) and NSAIDs    Precipitating factors:  Worsened by: Pain with urination  Therapies tried and outcome: acetaminophen (Tylenol) and NSAIDs helped    Accompanying Signs & Symptoms:  Risk of Fracture: None  Risk of Cauda Equina: None  Risk of Infection: None  Risk of Cancer: None  Risk of Ankylosing Spondylitis: Onset at age <35, male, AND morning back stiffness no            Review of Systems   Constitutional, HEENT, cardiovascular, pulmonary, gi and gu systems are negative, except as otherwise noted.      Objective    /76 (BP Location: Left arm, Patient Position: Sitting, Cuff Size: Adult Regular)   Pulse 65   Temp 98.2  F (36.8  C) (Oral)   Ht 1.689 m (5' 6.5\")   Wt 65.9 kg (145 lb 6 oz)   SpO2 99%   BMI 23.11 kg/m    Body mass index is 23.11 kg/m .  Physical Exam   GENERAL: healthy, alert and no distress  EYES: Eyes grossly normal to inspection, PERRL and conjunctivae and sclerae normal  RESP: lungs clear to auscultation - " no rales, rhonchi or wheezes  CV: regular rate and rhythm, normal S1 S2, no S3 or S4, no murmur, click or rub, no peripheral edema and peripheral pulses strong  MS: no gross musculoskeletal defects noted, no edema  SKIN: no suspicious lesions or rashes  BACK: no CVA tenderness, no paralumbar tenderness    A KUB and lumbar spine X-Ray was ordered. My reading of this film is negative for pathology that would explain his symptoms.  I did note what appears to be calcifications of the descending aorta, which I pointed out to the patient. (No comparison films available: pending review by Radiologist.)

## 2021-03-06 ENCOUNTER — HEALTH MAINTENANCE LETTER (OUTPATIENT)
Age: 46
End: 2021-03-06

## 2021-03-15 ENCOUNTER — IMMUNIZATION (OUTPATIENT)
Dept: PEDIATRICS | Facility: CLINIC | Age: 46
End: 2021-03-15
Payer: COMMERCIAL

## 2021-03-15 PROCEDURE — 0001A PR COVID VAC PFIZER DIL RECON 30 MCG/0.3 ML IM: CPT

## 2021-03-15 PROCEDURE — 91300 PR COVID VAC PFIZER DIL RECON 30 MCG/0.3 ML IM: CPT

## 2021-03-29 DIAGNOSIS — I10 ESSENTIAL HYPERTENSION WITH GOAL BLOOD PRESSURE LESS THAN 140/90: ICD-10-CM

## 2021-03-29 RX ORDER — ATENOLOL 25 MG/1
37.5 TABLET ORAL DAILY
Qty: 135 TABLET | Refills: 3 | OUTPATIENT
Start: 2021-03-29

## 2021-04-05 ENCOUNTER — IMMUNIZATION (OUTPATIENT)
Dept: PEDIATRICS | Facility: CLINIC | Age: 46
End: 2021-04-05
Attending: INTERNAL MEDICINE
Payer: COMMERCIAL

## 2021-04-05 PROCEDURE — 0002A PR COVID VAC PFIZER DIL RECON 30 MCG/0.3 ML IM: CPT

## 2021-04-05 PROCEDURE — 91300 PR COVID VAC PFIZER DIL RECON 30 MCG/0.3 ML IM: CPT

## 2021-05-04 ENCOUNTER — VIRTUAL VISIT (OUTPATIENT)
Dept: FAMILY MEDICINE | Facility: CLINIC | Age: 46
End: 2021-05-04
Payer: COMMERCIAL

## 2021-05-04 DIAGNOSIS — I25.10 CORONARY ARTERY DISEASE INVOLVING NATIVE CORONARY ARTERY OF NATIVE HEART WITHOUT ANGINA PECTORIS: ICD-10-CM

## 2021-05-04 DIAGNOSIS — I10 ESSENTIAL HYPERTENSION WITH GOAL BLOOD PRESSURE LESS THAN 140/90: ICD-10-CM

## 2021-05-04 PROCEDURE — 99214 OFFICE O/P EST MOD 30 MIN: CPT | Mod: GT | Performed by: FAMILY MEDICINE

## 2021-05-04 NOTE — PATIENT INSTRUCTIONS
At [facility], we strive to deliver an exceptional experience to you, every time we see you. If you receive a survey, please complete it as we do value your feedback.  If you have MyChart, you can expect to receive results automatically within 24 hours of their completion.  Your provider will send a note interpreting your results as well.   If you do not have MyChart, you should receive your results in about a week by mail.    Your care team:                            Family Medicine Internal Medicine   MD Dani Phelan MD Shantel Branch-Fleming, MD Srinivasa Vaka, MD Katya Belousova, PAHEATHER Otero, APRN CNP    Eduardo Vincent, MD Pediatrics   Flavio Turpin, PAHEATHER Campbell, CNP MD Wendy Meng APRN CNP   MD Liz Meraz MD Deborah Mielke, MD Lizeth Finnegan, APRN [facility]      Clinic hours: Monday - Thursday 7 am-6 pm; Fridays 7 am-5 pm.   Urgent care: Monday - Friday 10 am- 8 pm; Saturday and Sunday 9 am-5 pm.    Clinic: [phone]       [facility]: Monday - Thursday 8 am - 7 pm; Friday 8 am - 6 pm  [facility]: [phone]     Use [email or web address] for 24/7 diagnosis and treatment of dozens of conditions.

## 2021-05-04 NOTE — PROGRESS NOTES
"Micheal is a 45 year old who is being evaluated via a billable video visit.      How would you like to obtain your AVS? MyChart  If the video visit is dropped, the invitation should be resent by: Text to cell phone: [phone]  Will anyone else be joining your video visit? No          Assessment & Plan     (I10) Essential hypertension with goal blood pressure less than 140/90  (I25.10) Coronary artery disease involving native coronary artery of native heart without angina pectoris  Comment: Considering his known CAD, if his blood pressure is as high as he is reporting, he needs to be seen right away to confirm that, and adjust his medications accordingly, such as adding amlodipine  Plan: Advised the patient that he should be seen in person, preferably today or tomorrow      Laimn Parson MD  [facility]    Agnieszka Burton is a 45 year old who presents for the following health issues     HPI     Hypertension Follow-up      Do you check your blood pressure regularly outside of the clinic? yes    Are you following a low salt diet? No    Are your blood pressures ever more than 140 on the top number (systolic) OR more   than 90 on the bottom number (diastolic), for example 140/90? 150//90      How many days per week do you miss taking your medication? 0    Headache    Onset/Duration: 4-5 days,  worse yesterday & today  Description  Location: entire head    Character: throbbing pain  Frequency:  Constant in the afternoons   Duration:  Constant   Wake with headaches: no  Able to do daily activities when headache present: no   Intensity:  6/10  Progression of Symptoms:  worsening in the afternoon   Accompanying signs and symptoms: He describes having a few days of feeling headaches, head pressure, his eyes burning, and feeling like his head (scalp?)  Is \"hard\"  Stiff neck: no  Neck or upper back pain: YES- neck   Sinus or URI symptoms no   Fever: no  Nausea or vomiting: " no  Dizziness: no  Numbness/tingling: no  Weakness: no  Visual changes: none  History  Head trauma: no  Family history of migraines: no  Daily pain medication use: no  Previous tests for headaches: no  Neurologist evaluation: no  Precipitating or Alleviating factors (light/sound/sleep/caffeine): blood pressure   Therapies tried and outcome: He took one of his wife's blood pressure medications (amlodipine)               Outcome - seems to help   Frequent/daily pain medication use: no        Review of Systems   Constitutional, HEENT, cardiovascular, pulmonary, gi and gu systems are negative, except as otherwise noted.      Objective           Vitals:  No vitals were obtained today due to virtual visit.    Physical Exam   GENERAL: Healthy, alert and no distress  EYES: Eyes grossly normal to inspection.  No discharge or erythema, or obvious scleral/conjunctival abnormalities.  RESP: No audible wheeze, cough, or visible cyanosis.  No visible retractions or increased work of breathing.    SKIN: Visible skin clear. No significant rash, abnormal pigmentation or lesions.  NEURO: Cranial nerves grossly intact.  Mentation and speech appropriate for age.  PSYCH: Mentation appears normal, affect normal/bright, judgement and insight intact, normal speech and appearance well-groomed.                Video-Visit Details    Type of service:  Video Visit  Video Start Time: 5:32  Video End Time:5:56    Originating Location (pt. Location): Home    Distant Location (provider location):  [facility]     Platform used for Video Visit: [employer]

## 2021-05-07 ENCOUNTER — OFFICE VISIT (OUTPATIENT)
Dept: URGENT CARE | Facility: URGENT CARE | Age: 46
End: 2021-05-07
Payer: COMMERCIAL

## 2021-05-07 VITALS
TEMPERATURE: 97.3 F | HEART RATE: 58 BPM | SYSTOLIC BLOOD PRESSURE: 129 MMHG | OXYGEN SATURATION: 100 % | DIASTOLIC BLOOD PRESSURE: 74 MMHG | RESPIRATION RATE: 14 BRPM | BODY MASS INDEX: 23.75 KG/M2 | WEIGHT: 149.4 LBS

## 2021-05-07 DIAGNOSIS — R51.9 RIGHT-SIDED HEADACHE: Primary | ICD-10-CM

## 2021-05-07 DIAGNOSIS — Z95.5 HISTORY OF HEART ARTERY STENT: ICD-10-CM

## 2021-05-07 DIAGNOSIS — I10 BENIGN ESSENTIAL HYPERTENSION: ICD-10-CM

## 2021-05-07 PROCEDURE — 99215 OFFICE O/P EST HI 40 MIN: CPT | Performed by: PHYSICIAN ASSISTANT

## 2021-05-07 NOTE — PATIENT INSTRUCTIONS
Onset of r temporal headache x 5 days, totally new, also r eye pain, no decreased vision. Some jaw pain. History of 2 cardiac stents. To ER for evaluation. ? Brain MRI. ? Temporal arteritis

## 2021-05-08 NOTE — PROGRESS NOTES
Chief Complaint   Patient presents with     Headache     x5-6 days. Burning sensation- right side of head. Head feels hot.      Eye Problem     Pain     Chills             Differential Diagnosis:  Headache:  Tension headache, Common migraine, Sinusitis, Subarachnoid hemorrhage, Subdural hemorrhage, Intracranial tumor, temporal arteritis          ASSESSMENT:     ICD-10-CM    1. Right-sided headache  R51.9    2. History of heart artery stent  Z95.5    3. Benign essential hypertension  I10            PLAN:Onset of r temporal headache x 5 days, totally new, also r eye pain, no decreased vision. Some jaw pain. History of 2 cardiac stents. To ER for evaluation. ? Brain MRI. ? Temporal arteritis  He states this headache is very unusual for him and it is probably the worst headache he has felt in his life.  I have discussed clinical findings with patient.  All questions are answered, patient indicates understanding of these issues and is in agreement with plan.   Patient care instructions are discussed/given at the end of visit.     Tierney Beckford PA-C      SUBJECTIVE:  45-year-old male presents for mainly right-sided headache for 5 days.  Feels a pressure sensation on the top of his head.  He rates it 6 out of 10.  He has been using Tylenol and ibuprofen without significant relief.  He also reports right-sided eye pain and neck pain.  No decreased vision.  Also some jaw pain.  He is not dizzy.  He does feel tired.  He had a Covid vaccination 3 weeks ago.  No nausea or vomiting.  No cough or sore throat.  No extremity paresthesias or weakness.  He has noted that his blood pressure has been elevated at home, 150s over the 90s.  He states he took one of his wife's amlodipine pills and his head did feel better.  He currently is on atenolol for his hypertension.  He has history of cardiac stent x2.  He denies any tooth pain.  He denies any sinus congestion.  He denies any nasal discharge.  He has had a dentist tell him  that his exam suggest that he clenches or grinds his teeth.  He has noted that most of the pain is in the right temple area and it feels full to palpation.  No chest pain or shortness of breath.      Allergies   Allergen Reactions     Hydrochlorothiazide Other (See Comments) and Fatigue     hyponatremia     Metoprolol Other (See Comments)     ED       Past Medical History:   Diagnosis Date     Hyperlipidemia      Hypertension      Pre-diabetes        aspirin 81 MG EC tablet, Take 1 tablet (81 mg) by mouth daily  atenolol (TENORMIN) 25 MG tablet, Take 1.5 tablets (37.5 mg) by mouth daily for blood pressure and heart.  atorvastatin (LIPITOR) 40 MG tablet, Take 1 tablet (40 mg) by mouth daily  nitroGLYcerin (NITROSTAT) 0.4 MG sublingual tablet, For chest pain place 1 tablet under the tongue every 5 minutes for 3 doses. If symptoms persist 5 minutes after 1st dose call 911. (Patient not taking: Reported on 2/23/2021)  omeprazole (PRILOSEC) 40 MG DR capsule, Take 1 capsule (40 mg) by mouth 2 times daily (before meals) (Patient not taking: Reported on 5/7/2021)  polyethylene glycol (MIRALAX) 17 GM/Dose powder, TAKE 1 PKT BY MOUTH DAILY UNTIL BOWEL MOVEMENT DISSOLVE EACH DOSE  MLS (8 OUNCES) OF BEVERAGE.  sulindac (CLINORIL) 200 MG tablet, Take 1 tablet (200 mg) by mouth 2 times daily (with meals) as needed for back pain. (Patient not taking: Reported on 5/7/2021)  zolpidem (AMBIEN) 5 MG tablet, Take 0.5-1 tablets (2.5-5 mg) by mouth nightly as needed for sleep (take right at bedtime) . This is a 1-month supply. (Patient not taking: Reported on 5/7/2021)    No current facility-administered medications on file prior to visit.       Social History     Tobacco Use     Smoking status: Former Smoker     Packs/day: 0.20     Years: 8.00     Pack years: 1.60     Smokeless tobacco: Former User     Quit date: 10/1/2019     Tobacco comment: smoked less than 10 years, 3-5 cigarettes/day   Substance Use Topics     Alcohol use:  Never     Frequency: Never       ROS:  CONSTITUTIONAL: Negative for fever.  EYES: Negative for eye problems.  ENT: Neg for ST.  RESP: Neg for cough.  CV: Negative for chest pains.  GI: Negative for vomiting.  MUSCULOSKELETAL: as above.  NEUROLOGIC: as above.  SKIN: Negative for rash.  PSYCH: Normal mentation for age.    OBJECTIVE:  /74 (BP Location: Left arm, Patient Position: Sitting, Cuff Size: Adult Regular)   Pulse 58   Temp 97.3  F (36.3  C) (Tympanic)   Resp 14   Wt 67.8 kg (149 lb 6.4 oz)   SpO2 100%   BMI 23.75 kg/m    GENERAL APPEARANCE: Healthy, alert and no distress.  EYES:Conjunctiva/sclera clear.  EARS: No cerumen.   Ear canals w/o erythema.  TM's intact w/o erythema.    NOSE/MOUTH: Nose without ulcers, erythema or lesions.  SINUSES: No maxillary sinus tenderness.  THROAT: No erythema w/o tonsillar enlargement . No exudates.  NECK: Supple, nontender, no lymphadenopathy.  RESP: Lungs clear to auscultation - no rales, rhonchi or wheezes  CV: Regular rate and rhythm, normal S1 S2, no murmur noted.  NEURO: Awake, alert    SKIN: No rashes    No results found for any visits on 05/07/21.    Tierney Beckford PA-C

## 2021-05-14 ENCOUNTER — OFFICE VISIT (OUTPATIENT)
Dept: OPTOMETRY | Facility: CLINIC | Age: 46
End: 2021-05-14
Payer: COMMERCIAL

## 2021-05-14 DIAGNOSIS — H52.4 PRESBYOPIA: ICD-10-CM

## 2021-05-14 DIAGNOSIS — R51.9 HEADACHE ABOVE THE EYE REGION: Primary | ICD-10-CM

## 2021-05-14 PROCEDURE — 92015 DETERMINE REFRACTIVE STATE: CPT | Performed by: OPTOMETRIST

## 2021-05-14 PROCEDURE — 92004 COMPRE OPH EXAM NEW PT 1/>: CPT | Performed by: OPTOMETRIST

## 2021-05-14 ASSESSMENT — REFRACTION_MANIFEST
OS_CYLINDER: +0.25
OS_ADD: +1.50
OD_ADD: +1.50
OD_CYLINDER: +0.25
OD_AXIS: 155
OS_SPHERE: -0.25
OS_AXIS: 001
OD_SPHERE: PLANO

## 2021-05-14 ASSESSMENT — TONOMETRY
IOP_METHOD: ICARE
OS_IOP_MMHG: 15
OD_IOP_MMHG: 16

## 2021-05-14 ASSESSMENT — VISUAL ACUITY
METHOD: SNELLEN - LINEAR
OS_SC: 20/15
OS_SC+: -1
OD_SC+: -1
OD_SC: 20/15

## 2021-05-14 ASSESSMENT — EXTERNAL EXAM - LEFT EYE: OS_EXAM: NORMAL

## 2021-05-14 ASSESSMENT — SLIT LAMP EXAM - LIDS
COMMENTS: NORMAL
COMMENTS: NORMAL

## 2021-05-14 ASSESSMENT — CONF VISUAL FIELD
OS_NORMAL: 1
OD_NORMAL: 1
METHOD: COUNTING FINGERS

## 2021-05-14 ASSESSMENT — CUP TO DISC RATIO
OD_RATIO: 0.7
OS_RATIO: 0.6

## 2021-05-14 ASSESSMENT — EXTERNAL EXAM - RIGHT EYE: OD_EXAM: NORMAL

## 2021-05-14 NOTE — NURSING NOTE
Chief Complaints and History of Present Illnesses   Patient presents with     Annual Eye Exam       Chief Complaint(s) and History of Present Illness(es)     Annual Eye Exam     Laterality: both eyes    Associated symptoms: headache              Comments     Patient here for annual eye exam. Patient last saw Dr. Monzon in 2018. Was prescribed glasses, but does not wear them much. Did not bring them to the appointment today. Also uses +1.50 cheaters, feels these work very well.  Patient feels that his vision has changed since his last exam. Has been getting headaches for the past two weeks. The first headache was so bad that he went to the emergency room and had a CAT Scan done. Nothing abnormal was found. After that the patient thought the headaches may have been related to his vision and he started wearing his glasses more often. The headaches improved, but have not completely gone away. Currently has a headache, feels the pain behind his eye and in his head.                     Kwan Berry, Ophthalmic Assistant

## 2021-05-14 NOTE — PROGRESS NOTES
Assessment/Plan  (R51.9) Headache above the eye region  (primary encounter diagnosis)  Comment: Right sided. Patient seen in ED, all imaging within normal limits   Plan: Discussed findings with patient. Suspect that presbyopia may be playing a role given timing of symptoms and past relief with glasses. Recommend starting to use computer only specs with blue light blocker. If symptoms do not improve, patient ok to follow up with either this provider or neurology.     (H52.4) Presbyopia  Plan: REFRACTION [[id number]]        See above note. SRx dispensed for computer only.         Complete documentation of historical and exam elements from today's encounter can  be found in the full encounter summary report (not reduplicated in this progress  note). I personally obtained the chief complaint(s) and history of present illness. I  confirmed and edited as necessary the review of systems, past medical/surgical  history, family history, social history, and examination findings as documented by  others; and I examined the patient myself. I personally reviewed the relevant tests,  images, and reports as documented above. I formulated and edited as necessary the  assessment and plan and discussed the findings and management plan with the  patient and family.    Alejandro Trejo, OD

## 2021-07-13 ENCOUNTER — OFFICE VISIT (OUTPATIENT)
Dept: CARDIOLOGY | Facility: CLINIC | Age: 46
End: 2021-07-13
Payer: COMMERCIAL

## 2021-07-13 VITALS
DIASTOLIC BLOOD PRESSURE: 80 MMHG | OXYGEN SATURATION: 99 % | HEIGHT: 66 IN | SYSTOLIC BLOOD PRESSURE: 128 MMHG | BODY MASS INDEX: 24.75 KG/M2 | WEIGHT: 154 LBS | HEART RATE: 48 BPM

## 2021-07-13 DIAGNOSIS — I25.10 CORONARY ARTERY DISEASE INVOLVING NATIVE CORONARY ARTERY OF NATIVE HEART WITHOUT ANGINA PECTORIS: ICD-10-CM

## 2021-07-13 DIAGNOSIS — E78.2 MIXED HYPERLIPIDEMIA: Primary | ICD-10-CM

## 2021-07-13 PROCEDURE — 99214 OFFICE O/P EST MOD 30 MIN: CPT | Performed by: INTERNAL MEDICINE

## 2021-07-13 RX ORDER — NITROGLYCERIN 0.4 MG/1
TABLET SUBLINGUAL
Qty: 25 TABLET | Refills: 3 | Status: SHIPPED | OUTPATIENT
Start: 2021-07-13 | End: 2023-04-28

## 2021-07-13 RX ORDER — ATORVASTATIN CALCIUM 80 MG/1
80 TABLET, FILM COATED ORAL DAILY
Qty: 90 TABLET | Refills: 3 | Status: SHIPPED | OUTPATIENT
Start: 2021-07-13 | End: 2022-11-18

## 2021-07-13 ASSESSMENT — PAIN SCALES - GENERAL: PAINLEVEL: NO PAIN (0)

## 2021-07-13 ASSESSMENT — MIFFLIN-ST. JEOR: SCORE: 1531.67

## 2021-07-13 NOTE — PROGRESS NOTES
"Micheal Husain's goals for this visit include: Discover cause of chest pain   He requests these members of his care team be copied on today's visit information:     PCP: No Ref-Primary, Physician    Referring Provider:  No referring provider defined for this encounter.    /80 (BP Location: Left arm, Patient Position: Sitting, Cuff Size: Adult Regular)   Pulse (!) 48   Ht 1.685 m (5' 6.34\")   Wt 69.9 kg (154 lb)   SpO2 99%   BMI 24.60 kg/m      Do you need any medication refills at today's visit? No      Jose Falcon, EMT  Clinic Support  [facility]  [place]    Employed by [facility] [employer]      "

## 2021-07-13 NOTE — PATIENT INSTRUCTIONS
It was a pleasure to see you in the cardiology clinic today.    If you have any questions, you can reach my nurse, Marianne Riggs, at [phone].     Note the new medications: None    Change the following medications: Increase Lipitor (Atorvastatin) for cholesterol from 40 mg a day to 80 mg a day.    Stop the following medications: None    The results from today include: None    Tests ordered today: Stress Cardiac MRI to evaluate the chest discomfort.    I would like you to follow up with Dr. Workman in one year.    Sincerely,      Yayo Workman MD     [facility]

## 2021-07-13 NOTE — PROGRESS NOTES
[place] CARDIOLOGY FOLLOW UP VISIT:    Referring Provider:  No ref. provider found  Primary Care Provider:   No Ref-Primary, Physician  Indication for Consultation:  CAD    HPI: Micheal Husain is a 45 year old male being seen today for evaluation of CAD.   The patient's risk factor profile is: (+) HTN, (+) Type II DM, (+) hypercholesterolemia, (+)  prior social tobacco use [quit Oct 2019], (+) fam Hx premature CAD [paternal, fraternal].    The patient had a negative stress echo (3/19/2018). CT angiography performed 10/3/19 demonstrated a coronary calcium score of 146 with calcium noted in the LAD and circumflex. LAD FFR-CT distal to proximal-mid LAD stenosis was 0.89. There was noted to be severe stenosis in the distal left circumflex.  The patient had coronary angiography (10/23/19) at [facility] and underwent PCI of a  in the distal LCx.  There was minimal disease in the RCA and 50% narrowing in the mid LAD.       He presented to [facility] ER with chest discomfort, TWI in III and aVF, no ST shift, no Q waves, and underwent repeat coronary angiography (11/15/19).   The stents in the distal LCx were widely patent.  There was an 80% narrowing in a small caliber OM1 and disease in the distal segment of the Rt PDA, neither of which were amenable to PCI.  He was continued on medical therapy.    I saw the patient last in Oct 2020 at which time he described recurrent chest discomfort. The discomfort was in the upper left sided adjacent to the shoulder.  It was brought on by raking leaves and cleaning up the outside of his home.  He notes the discomfort was worsened by left arm movement.  It lasted for several hours and improved over the next 24 hours.  I elected not to pursue additional CV workup at that time.      It has been 8 months since his last visit.  The chest discomfort had evidently resolved.  He started experiencing chest discomfort and burning 2 months ago.  When he exercise (working out,  jogging) he has noted chest heaviness 2-5 minutes after initiation of exercise.  With cessation of exercise, it resolves in the course of 5 minutes.  He has not used SL NTG.  He continues to experience occasional chest discomfort with exertion.  It is not reproducible.  He also notes having these symptoms at rest.    He had not had PND, orthopnea, pedal edema, palpitations, lightheadedness, and syncope.      He was noted to have hyponatremia.  His hydrochlorothiazide was stopped and his Atenolol was increased from 25 to 37.5 mg every day.    The patient has no history of CHF, arrhythmia, or valvular heart disease.  The patient has no Hx of PAD or cerebrovascular disease.     PAST MEDICAL HISTORY:  Past Medical History:   Diagnosis Date     Hyperlipidemia      Hypertension      Pre-diabetes        CURRENT MEDICATIONS:  Current Outpatient Medications   Medication Sig     aspirin 81 MG EC tablet Take 1 tablet (81 mg) by mouth daily     atenolol (TENORMIN) 25 MG tablet Take 1.5 tablets (37.5 mg) by mouth daily for blood pressure and heart.     atorvastatin (LIPITOR) 40 MG tablet Take 1 tablet (40 mg) by mouth daily     nitroGLYcerin (NITROSTAT) 0.4 MG sublingual tablet For chest pain place 1 tablet under the tongue every 5 minutes for 3 doses. If symptoms persist 5 minutes after 1st dose call 911.     omeprazole (PRILOSEC) 40 MG DR capsule Take 1 capsule (40 mg) by mouth 2 times daily (before meals) (Patient not taking: Reported on 5/7/2021)     polyethylene glycol (MIRALAX) 17 GM/Dose powder TAKE 1 PKT BY MOUTH DAILY UNTIL BOWEL MOVEMENT DISSOLVE EACH DOSE  MLS (8 OUNCES) OF BEVERAGE. (Patient not taking: Reported on 7/13/2021)     sulindac (CLINORIL) 200 MG tablet Take 1 tablet (200 mg) by mouth 2 times daily (with meals) as needed for back pain. (Patient not taking: Reported on 5/7/2021)     zolpidem (AMBIEN) 5 MG tablet Take 0.5-1 tablets (2.5-5 mg) by mouth nightly as needed for sleep (take right at bedtime)  . This is a 1-month supply. (Patient not taking: Reported on 2021)     No current facility-administered medications for this visit.       PAST SURGICAL HISTORY:  Past Surgical History:   Procedure Laterality Date     CARDIAC CATHERIZATION  10/23/2019    PTCA-stent       ALLERGIES  Hydrochlorothiazide and Metoprolol    FAMILY HX:  Family History   Problem Relation Age of Onset     Cerebrovascular Disease Father      Heart Disease Father              Cataracts Father      Kidney failure Mother              Cataracts Mother      Breast Cancer Sister      Coronary Stenting Brother 47     Coronary Stenting Brother 47     Diabetes No family hx of        SOCIAL HX:  Social History     Socioeconomic History     Marital status:      Spouse name: Not on file     Number of children: Not on file     Years of education: Not on file     Highest education level: Not on file   Occupational History     Not on file   Social Needs     Financial resource strain: Not on file     Food insecurity:     Worry: Not on file     Inability: Not on file     Transportation needs:     Medical: Not on file     Non-medical: Not on file   Tobacco Use     Smoking status: Former Smoker     Smokeless tobacco: Never Used   Substance and Sexual Activity     Alcohol use: Not on file     Drug use: Not on file     Sexual activity: Not on file   Lifestyle     Physical activity:     Days per week: Not on file     Minutes per session: Not on file     Stress: Not on file   Relationships     Social connections:     Talks on phone: Not on file     Gets together: Not on file     Attends Mandaen service: Not on file     Active member of club or organization: Not on file     Attends meetings of clubs or organizations: Not on file     Relationship status: Not on file     Intimate partner violence:     Fear of current or ex partner: Not on file     Emotionally abused: Not on file     Physically abused: Not on file     Forced sexual activity: Not on  "file   Other Topics Concern     Not on file   Social History Narrative     Not on file       ROS:  Review Of Systems  Skin: negative  Eyes: negative  Ears/Nose/Throat: negative  Respiratory: No shortness of breath, dyspnea on exertion, cough, or hemoptysis  Cardiovascular: negative  Gastrointestinal: negative  Genitourinary: negative  Musculoskeletal: negative  Neurologic: negative  Psychiatric: negative  Hematologic/Lymphatic/Immunologic: negative  Endocrine: negative      VITAL SIGNS:  /80 (BP Location: Left arm, Patient Position: Sitting, Cuff Size: Adult Regular)   Pulse (!) 48   Ht 1.685 m (5' 6.34\")   Wt 69.9 kg (154 lb)   SpO2 99%   BMI 24.60 kg/m    Body mass index is 24.6 kg/m .  Wt Readings from Last 2 Encounters:   21 67.8 kg (149 lb 6.4 oz)   21 65.9 kg (145 lb 6 oz)       PHYSICAL EXAM  General:  The patient is alert and is in no acute distress.  HEENT: Unremarkable.  Neck: Carotids +4/4 bilaterally without bruits.  JVP not elevated.  Lungs: CTA without ronchi, wheezes, or rales.  Cardiac: Regular rate and rhytm.  Normal S1 and S2.  No murmur, rub, or gallop.  PMI in Lf 5th ICS.  Abd: Benign.  No bruits or pulsatile mass.  Extrem: No C/C/E.   Brachial, radial, femoral, popliteal, DP, and PT pulses +4/4 bilaterally.  Neuro exam grossly intact.    LABS  Recent Labs   Lab Test 20  0920   WBC 6.5 6.6   HGB 13.4 13.4   HCT 39.2* 40.6    277     Recent Labs   Lab Test 21  0908 10/03/20  1115 20   NA  --  134 128*   POTASSIUM  --  4.0 3.9   CHLORIDE  --  100 91*   CO2  --  27 30   BUN  --  8 7   CR 0.79 0.77 0.70   GFRESTIMATED >90 >90 >90     Recent Labs   Lab Test 21  0908 10/03/20  1115   CHOL 163 117   HDL 50 45   LDL 87 48   TRIG 131 120       EK2021  SB at 52  Normal intervals and axes.  J point elevation early precordial leads  No ST shift, TWI, or Q waves.    EK19  Normal ECG.    EK/15/19  [[facility]  NSR,  No Q waves.  No ST shift.      ECHO: None    CARDIAC MRI: None    CORONARY CTA: 9/30/19  Single vessel coronary artery disease with severe stenosis within distal LCX     FINDINGS:  1.  2 vessel coronary disease with intermediate stenosis within proximal-mid LAD and severe stenosis of the distal LCX.   2.  LAD: FFR-CT distal to proximal-mid LAD stenosis is 0.89 or non-significant.   3.  LCX: Severe stenosis in distal LCX and FFR-CT not performed.   4.  RCA: No stenosis >30%; FFR-CT analysis was not performed.     DOMINANCE: Right dominant system.   Normal coronary origins and course.     LEFT MAIN:   The left main arises normally from the left coronary cusp and is  widely patent without any detectable stenosis.      LEFT ANTERIOR DESCENDING:   There is a mixed calcified and non-calcified plaque in the proximal-mid LAD with moderate stenosis (40-60%). The rest of the LAD and diagonal system is widely patent without any detectable stenosis.      LEFT CIRCUMFLEX:   The circumflex is the non-dominant artery which gives rise to two large OM branches. Distal to the takeoff of the 2nd OM branch, there is a severe obstruction (non-calcified plaque) with sub-total occlusion of the circumflex. Small OM1 with small proximal plaque with mild 30-50% stenosis proximally. The larger OM2 branch is free of disease.     RIGHT CORONARY ARTERY:   The right coronary artery and its major branches are widely patent without any detectable atherosclerosis or stenosis.     ADDITIONAL FINDINGS:   The proximal ascending aorta is normal in size.   Normal pulmonary venous anatomy with all four pulmonary veins draining  into the left atrium.    The left atrial appendage is free of thrombus.  There is no left ventricular mass or thrombus.   Normal pericardial thickness. There is no pericardial effusion.    EXERCISE STRESS ECHO:  3/9/18  Conclusion:  Negative exercise echocardiogram at a diagnostic level of peak stress (92% MPHR).      Patient developed no chest pain.  Test stopped due to target achieved.  Normal functional capacity.  No regional wall motion abnormalities at rest or with stress.  Normal resting LV function with EF of approximately 60-65%; normal response to exercise with increase to approximately 70-75%.  Hypertensive blood pressure response to exercise.  No ECG evidence of ischemia.  No significant valvular disease noted on routine screening color flow Doppler  and pulsed Doppler examination.  The ascending aortic segment is normal.    CARDIAC CATH:  10/23/19  FINAL DIAGNOSIS  1.   (Chronic Total Occlusion) of distal LCx  2.  Successful PTCA and drug-eluting stent to distal circumflex  with 2.75x12 and 2.5x12 mm Synergy  3.  Severe coronary artery atherosclerosis    CORONARY DIAGNOSTIC SUMMARY  Coronary artery dominance is right.   The left main coronary artery is 10% obstructed by a discrete lesion.   The middle left anterior descending artery is 50% obstructed by a tubular lesion.   The distal circumflex artery is 100% obstructed by a discrete lesion and 70% obstructed by a discrete lesion.   The first obtuse marginal is 70% obstructed by a discrete lesion.   The proximal right coronary artery is 10% obstructed by a discrete lesion.   CORONARY INTERVENTION SUMMARY  Successful intervention of the Distal Circumflex Artery. The preintervention stenosis was 100%. The post intervention stenosis was 0%. Devices used include Stent and PTCA.     CARDIAC CATH: 11/15/2019  [[facility]]  LMCA: ---  LAD: 40% mid  LCx: Previously placed stents widely patent  OM1: Small caliber, 80%, too small to intervene upon  RCA: Disease in small caliber distal Rt PDA    ASSESSMENT AND PLAN:   1. CAD, Atypical Chest Pain.  Mr. Husain has single vessel CAD with patent stents in the distal LCx and an 80% stenosis in a small OM1 as well as disease in the distal Rt PDA [small caliber] and a 40% mid LAD. Asymptomatic.  CV exam unremarkable. ECG  reverted to normal (resolution of inferior TWI) at last visit.  He has normal LV function by the most recent stress test.  He had hyperdynamic LV function at the time of his last ECHO (March 2018).  He is on ASA, beta blocker, and a statin.   He has no intention of resuming tobacco.  Check stress cardiac MRI.  If stress cardiac MRI positive, plan on coronary angiogram.  Otherwise, consider resuming Imdur 30 mg every day.  He can take SL NTG as needed.  If the symptoms increase in frequency or severity, plan on coronary angiogram.    2. HTN.  Well controlled.      3. Hypercholesterolemia.  Continue Lipitor 40 mg every day.  LDL 48 mg/dl in Oct 2020 ---> LDL 87 in Feb 2021.  Increase Lipitor to 80 mg every day.  Check lipids in 6 weeks and if LDL remains > 70 mg/dl, add Zetia 10 mg every day.    4. Type II Diabetes.  Continue Metformin.  F/U with PCP.    5. Tobacco cessation.  Patient has no intention of resuming tobacco use.  I spent 5 minutes discussing this topic with him.      FOLLOW UP:  1 year      Yayo Workman MD    Divisions of Cardiology  [place], [place]    CC  Patient Care Team:  No Ref-Primary, Physician as PCP - Lamin Ash MD as Assigned PCP  Yayo Workman MD as Assigned Heart and Vascular Provider  Aleajndro Trejo OD as Assigned Surgical Provider  Micheal Margaret Husain is a 45 year old male who is being evaluated via a billable video visit.

## 2021-07-14 DIAGNOSIS — I25.10 CORONARY ARTERY DISEASE INVOLVING NATIVE CORONARY ARTERY OF NATIVE HEART WITHOUT ANGINA PECTORIS: ICD-10-CM

## 2021-07-14 PROCEDURE — 93000 ELECTROCARDIOGRAM COMPLETE: CPT

## 2021-08-31 ENCOUNTER — HOSPITAL ENCOUNTER (OUTPATIENT)
Dept: MRI IMAGING | Facility: CLINIC | Age: 46
Discharge: HOME | End: 2021-08-31
Attending: INTERNAL MEDICINE | Admitting: INTERNAL MEDICINE
Payer: COMMERCIAL

## 2021-08-31 VITALS
HEART RATE: 50 BPM | OXYGEN SATURATION: 100 % | SYSTOLIC BLOOD PRESSURE: 125 MMHG | DIASTOLIC BLOOD PRESSURE: 68 MMHG | RESPIRATION RATE: 16 BRPM

## 2021-08-31 DIAGNOSIS — I25.10 CORONARY ARTERY DISEASE INVOLVING NATIVE CORONARY ARTERY OF NATIVE HEART WITHOUT ANGINA PECTORIS: Primary | ICD-10-CM

## 2021-08-31 DIAGNOSIS — I25.10 CORONARY ARTERY DISEASE INVOLVING NATIVE CORONARY ARTERY OF NATIVE HEART WITHOUT ANGINA PECTORIS: ICD-10-CM

## 2021-08-31 PROCEDURE — 75563 CARD MRI W/STRESS IMG & DYE: CPT | Mod: 26 | Performed by: STUDENT IN AN ORGANIZED HEALTH CARE EDUCATION/TRAINING PROGRAM

## 2021-08-31 PROCEDURE — 93005 ELECTROCARDIOGRAM TRACING: CPT

## 2021-08-31 PROCEDURE — 93010 ELECTROCARDIOGRAM REPORT: CPT | Mod: 76 | Performed by: INTERNAL MEDICINE

## 2021-08-31 PROCEDURE — 75563 CARD MRI W/STRESS IMG & DYE: CPT

## 2021-08-31 PROCEDURE — A9585 GADOBUTROL INJECTION: HCPCS | Performed by: INTERNAL MEDICINE

## 2021-08-31 PROCEDURE — 255N000002 HC RX 255 OP 636: Performed by: INTERNAL MEDICINE

## 2021-08-31 PROCEDURE — 93016 CV STRESS TEST SUPVJ ONLY: CPT | Performed by: STUDENT IN AN ORGANIZED HEALTH CARE EDUCATION/TRAINING PROGRAM

## 2021-08-31 PROCEDURE — 250N000011 HC RX IP 250 OP 636: Performed by: STUDENT IN AN ORGANIZED HEALTH CARE EDUCATION/TRAINING PROGRAM

## 2021-08-31 PROCEDURE — 93018 CV STRESS TEST I&R ONLY: CPT | Performed by: STUDENT IN AN ORGANIZED HEALTH CARE EDUCATION/TRAINING PROGRAM

## 2021-08-31 PROCEDURE — 93017 CV STRESS TEST TRACING ONLY: CPT

## 2021-08-31 PROCEDURE — 999N000054 HC STATISTIC EKG NON-CHARGEABLE

## 2021-08-31 RX ORDER — ONDANSETRON 2 MG/ML
4 INJECTION INTRAMUSCULAR; INTRAVENOUS
Status: DISCONTINUED | OUTPATIENT
Start: 2021-08-31 | End: 2021-09-01 | Stop reason: HOSPADM

## 2021-08-31 RX ORDER — DIPHENHYDRAMINE HCL 25 MG
25 CAPSULE ORAL
Status: DISCONTINUED | OUTPATIENT
Start: 2021-08-31 | End: 2021-09-01 | Stop reason: HOSPADM

## 2021-08-31 RX ORDER — DIAZEPAM 5 MG
5 TABLET ORAL EVERY 30 MIN PRN
Status: DISCONTINUED | OUTPATIENT
Start: 2021-08-31 | End: 2021-09-01 | Stop reason: HOSPADM

## 2021-08-31 RX ORDER — GADOBUTROL 604.72 MG/ML
10 INJECTION INTRAVENOUS ONCE
Status: COMPLETED | OUTPATIENT
Start: 2021-08-31 | End: 2021-08-31

## 2021-08-31 RX ORDER — DIPHENHYDRAMINE HYDROCHLORIDE 50 MG/ML
25-50 INJECTION INTRAMUSCULAR; INTRAVENOUS
Status: DISCONTINUED | OUTPATIENT
Start: 2021-08-31 | End: 2021-09-01 | Stop reason: HOSPADM

## 2021-08-31 RX ORDER — REGADENOSON 0.08 MG/ML
0.4 INJECTION, SOLUTION INTRAVENOUS ONCE
Status: COMPLETED | OUTPATIENT
Start: 2021-08-31 | End: 2021-08-31

## 2021-08-31 RX ORDER — AMINOPHYLLINE 25 MG/ML
100 INJECTION, SOLUTION INTRAVENOUS ONCE
Status: COMPLETED | OUTPATIENT
Start: 2021-08-31 | End: 2021-08-31

## 2021-08-31 RX ORDER — ACYCLOVIR 200 MG/1
0-1 CAPSULE ORAL
Status: DISCONTINUED | OUTPATIENT
Start: 2021-08-31 | End: 2021-09-01 | Stop reason: HOSPADM

## 2021-08-31 RX ORDER — ALBUTEROL SULFATE 90 UG/1
2 AEROSOL, METERED RESPIRATORY (INHALATION) EVERY 5 MIN PRN
Status: DISCONTINUED | OUTPATIENT
Start: 2021-08-31 | End: 2021-09-01 | Stop reason: HOSPADM

## 2021-08-31 RX ORDER — METHYLPREDNISOLONE SODIUM SUCCINATE 125 MG/2ML
125 INJECTION, POWDER, LYOPHILIZED, FOR SOLUTION INTRAMUSCULAR; INTRAVENOUS
Status: DISCONTINUED | OUTPATIENT
Start: 2021-08-31 | End: 2021-09-01 | Stop reason: HOSPADM

## 2021-08-31 RX ORDER — CAFFEINE CITRATE 20 MG/ML
60 SOLUTION INTRAVENOUS
Status: DISCONTINUED | OUTPATIENT
Start: 2021-08-31 | End: 2021-09-01 | Stop reason: HOSPADM

## 2021-08-31 RX ADMIN — REGADENOSON 0.4 MG: 0.08 INJECTION, SOLUTION INTRAVENOUS at 14:08

## 2021-08-31 RX ADMIN — GADOBUTROL 10 ML: 604.72 INJECTION INTRAVENOUS at 14:36

## 2021-08-31 RX ADMIN — AMINOPHYLLINE 100 MG: 25 INJECTION, SOLUTION INTRAVENOUS at 14:11

## 2021-08-31 NOTE — PROGRESS NOTES
Pt arrived for cardiac MRI with stress. Allergies, medications, and safety checklist reviewed with patient. Test explained and all questions were answered. Denies caffeine intake. Lungs are clear. Lexiscan 0.4 mg given over 10 seconds followed by 5 mL of saline. After stress imaging complete, 100 mg IV Aminophylline given per MD order. Pt tolerated the scan, medications, and contrast well. Pre and post EKG completed. Pt monitored after MRI and escorted back to [facility] waiting room.

## 2021-09-01 LAB
ATRIAL RATE - MUSE: 48 BPM
ATRIAL RATE - MUSE: 52 BPM
DIASTOLIC BLOOD PRESSURE - MUSE: NORMAL MMHG
DIASTOLIC BLOOD PRESSURE - MUSE: NORMAL MMHG
INTERPRETATION ECG - MUSE: NORMAL
INTERPRETATION ECG - MUSE: NORMAL
P AXIS - MUSE: 51 DEGREES
P AXIS - MUSE: 79 DEGREES
PR INTERVAL - MUSE: 146 MS
PR INTERVAL - MUSE: 156 MS
QRS DURATION - MUSE: 82 MS
QRS DURATION - MUSE: 94 MS
QT - MUSE: 420 MS
QT - MUSE: 428 MS
QTC - MUSE: 375 MS
QTC - MUSE: 398 MS
R AXIS - MUSE: 54 DEGREES
R AXIS - MUSE: 57 DEGREES
SYSTOLIC BLOOD PRESSURE - MUSE: NORMAL MMHG
SYSTOLIC BLOOD PRESSURE - MUSE: NORMAL MMHG
T AXIS - MUSE: 53 DEGREES
T AXIS - MUSE: 57 DEGREES
VENTRICULAR RATE- MUSE: 48 BPM
VENTRICULAR RATE- MUSE: 52 BPM

## 2021-09-09 ENCOUNTER — VIRTUAL VISIT (OUTPATIENT)
Dept: FAMILY MEDICINE | Facility: CLINIC | Age: 46
End: 2021-09-09
Payer: COMMERCIAL

## 2021-09-09 DIAGNOSIS — R10.13 EPIGASTRIC PAIN: Primary | ICD-10-CM

## 2021-09-09 PROCEDURE — 99214 OFFICE O/P EST MOD 30 MIN: CPT | Mod: TEL | Performed by: FAMILY MEDICINE

## 2021-09-09 RX ORDER — PANTOPRAZOLE SODIUM 40 MG/1
40 TABLET, DELAYED RELEASE ORAL
Qty: 180 TABLET | Refills: 0 | Status: SHIPPED | OUTPATIENT
Start: 2021-09-09 | End: 2021-12-07

## 2021-09-09 NOTE — PROGRESS NOTES
Micheal is a 46 year old who is being evaluated via a billable telephone visit.      What phone number would you like to be contacted at? [phone]  How would you like to obtain your AVS? Stephanie    Assessment & Plan     (R10.13) Epigastric pain  (primary encounter diagnosis)  Comment: His H. pylori test has been negative in the past, but I still want to recheck it since his symptoms are suggestive of gastritis/PUD  Plan: Helicobacter pylori Antigen Stool, pantoprazole        (PROTONIX) 40 MG EC tablet, Adult Gastro Ref -         Procedure Only        Try different PPI.  EGD if symptoms worsen, if they do not improve at all within the next 1 month, or if they do not resolve within 2 to 3 months.  Handouts provided        Return in about 3 months (around 12/9/2021) for full physical, or sooner for EGD if symptoms worsen or fail to improve by then.    Lamin Parson MD  [facility]    Agnieszka Burton is a 46 year old who presents for the following health issues     HPI         Gastrointestinal symptoms    When he eats food, it goes away for about an hour, then comes back. 2 nights ago the pain woke him up. Pain, burning, discomfort. He resumed omep (prescribed ?last year)  A couple doses.      Duration:  about 1 week    Description:           ABDOMINAL PAIN - epigastric area  .   Pain is described as burning and uncomfortable and does not radiate.    Intensity: Variable    Accompanying signs and symptoms:  bloating and ?heartburn    History  Previous similar problem: YES-last summer (please see the 6/12/2020 visit)  Previous evaluation:  none    Aggravating factors: none    Alleviating factors: Eating    Other Therapies tried: He has taken 2 doses of omeprazole (prescribed last year) in the past 5 days, however he has taken them with food.  In fact, he tells me that he did not really notice or bother with the directions of taking omeprazole before meals when it was prescribed  last time.     Review of Systems   CONSTITUTIONAL: NEGATIVE for fever, chills, change in weight  ENT/MOUTH: NEGATIVE for ear, mouth and throat problems  RESP: NEGATIVE for significant cough or SOB  CV: NEGATIVE for chest pain, palpitations or peripheral edema      Objective           Vitals:  No vitals were obtained today due to virtual visit.    Physical Exam   healthy, alert and no distress  PSYCH: Alert and oriented times 3; coherent speech, normal   rate and volume, able to articulate logical thoughts, able   to abstract reason, no tangential thoughts, no hallucinations   or delusions  His affect is normal and pleasant  RESP: No cough, no audible wheezing, able to talk in full sentences  Remainder of exam unable to be completed due to telephone visits       Ref. Range 6/18/2020 13:00   Helicobacter pylori Antigen Stool Latest Ref Range: NEG^Negative  Negative           Phone call duration: 18 minutes

## 2021-09-09 NOTE — PATIENT INSTRUCTIONS
Patient Education     Epigastric Pain (Uncertain Cause)  Epigastric pain is pain in the upper abdomen. It can be a sign of disease. Common causes include:     Acid reflux (stomach acid flowing up into the esophagus)    Gastritis (irritation of the stomach lining) Most often this is from aspirin or NSAID medicines such as ibuprofen, bacteria called H. pylori, or frequent alcohol use.    Peptic ulcer disease    Inflammation of the pancreas    Gallstone    Infection in the gallbladder  Pain may be dull or burning. It may spread upward to the chest or to the back. There may be other symptoms such as belching, bloating, cramps or hunger pains. There may be weight loss or poor appetite, nausea or vomiting.   Since the cause of your pain is not certain yet, you may need more tests. Sometimes the doctor will treat you for the most likely condition to see if there is improvement before doing more tests.     Home care  Medicines    Antacids help neutralize the normal acids in your stomach. If you don t like the liquid, you can try a chewable one. You may find one works better than another for you. Overuse can cause diarrhea or constipation. Call your provider if you have questions about your medicines or concerns about side effects.    Acid blockers (H2 blockers) decrease acid production. Examples are cimetidine and famotidine.    Acid inhibitors (PPIs) decrease acid production in a different way than the blockers. You may find they work better, but can take a little longer to take effect.  Examples are omeprazole, lansoprazole, pantoprazole, rabeprazole, and esomeprazole. Many of these are available over-the-counter or available as generics.    Take an antacid 30 to 60 minutes after eating and at bedtime, but not at the same time as an acid blocker.    Try not to take NSAIDs such as ibuprofen. Aspirin may also cause problems, but if taking it for your heart or other medical reasons, talk to your doctor before stopping  it.  Diet    If certain foods seem to cause your pain, try not to eat them. Certain foods can worsen symptoms of gastritis. Limit or avoid fatty, fried, and spicy foods, as well as coffee, chocolate, mint, and foods with high acid content such as tomatoes and citrus fruit and juices (orange, grapefruit, lemon).    Eat slowly and chew food well before swallowing. Symptoms of gastritis can be worsened by certain foods.    Don't drink alcohol. It can irritate the stomach. If you have trouble giving up alcohol, ask your doctor for treatment resources.    Don't consume caffeine, or use tobacco. These can delay healing and worsen your problem.    Try eating smaller meals with snacks in between. Don't eat large meals before bedtime.    Keep an empty stomach for 2 to 3 hours before lying down.    Prop the head of the bed up if you have overnight symptoms. This helps acid clear from your esophagus.    Follow-up care  Follow up with your healthcare provider or as advised.  When to seek medical advice  Call your healthcare provider right away if any of the following occur:    Stomach pain worsens or moves to the right lower part of the abdomen    Chest pain appears, or if it worsens or spreads to the chest, back, neck, shoulder, or arm    Frequent vomiting (can t keep down liquids)    Blood in the stool or vomit (red or black color)    Feeling weak or dizzy, fainting, or having trouble breathing    Fever of 100.4 F (38 C) or higher, or as directed by your healthcare provider    Abdominal swelling    Worsening symptoms or new symptoms  [employer] last reviewed this educational content on 5/1/2020 2000-2021 [employer]. All rights reserved. This information is not intended as a substitute for professional medical care. Always follow your healthcare professional's instructions.           Patient Education     Upper GI Endoscopy  An upper GI endoscopy lets your healthcare provider look right into the beginning of  your gastrointestinal (GI) tract. The esophagus, stomach, and the first part of the small intestine (the duodenum) make up the upper GI tract.       During endoscopy, a long, flexible tube is used to view the inside of your upper GI tract.    Before the test  Follow these and any other instructions you are given before your endoscopy. If you don t follow the healthcare provider s instructions carefully, the test may need to be canceled or done over:     Follow any directions you are given for not eating or drinking before your test. In some cases, you may be able to take medicines with sips of water until 2 hours before the test. Talk with your provider about this.     Bring your X-rays and any other test results you have.    Because you will be sedated, arrange for an adult to drive you home after the exam.    Tell your provider before the exam if you are taking any medicines. This includes any over-the-counter and prescription medicines, vitamins, herbs, and supplements. Some medicines may be adjusted or stopped before the test. Don't stop any medicine unless directed by your provider.    Tell your provider if you have any health problems.  The procedure  Here is what to expect:    You will lie on the endoscopy table. People often lie on their left side.    You will be placed on a heart monitor and given oxygen.    Your throat may be numbed with a spray or gargle. You are given medicine through an IV (intravenous) line that will help you relax and stay comfortable. You may be awake or asleep during the test.    The healthcare provider will put the endoscope in your mouth and down your esophagus. It is thinner than most pieces of food that you swallow. It won't affect your breathing. The medicine helps keep you from gagging.    Air is put into your GI tract to expand it. It can make you burp.    During the procedure, the healthcare provider can take tissue samples (biopsies) and remove abnormalities such as polyps.  The provider can also treat abnormalities using tools placed through the endoscope. You will not feel this.     The endoscope carries images of your upper GI tract to a video screen. If you are awake, you may be able to look at the images.    After the procedure is done, you will rest for a time. An adult must drive you home.    After the procedure, you may feel gassy for a few hours. You may have a sore throat which should improve within a day or 2.  When to call your healthcare provider  Call your healthcare provider if you have:     Black or tarry stools, or blood in your stool    Fever    Pain in your belly that does not go away    Upset stomach and vomiting, or vomiting blood  [employer] last reviewed this educational content on 6/1/2019 2000-2021 [employer]. All rights reserved. This information is not intended as a substitute for professional medical care. Always follow your healthcare professional's instructions.           Patient Education     Understanding H. pylori and Ulcers  Ulcers are sores in the lining of your digestive tract. They were once thought to be caused by too much spicy food, stress, or an anxious personality. It's now known that most ulcers are likely due to infection with bacteria known as Helicobacter pylori (H. pylori). They could also be from using anti-inflammatory medicines such as aspirin and NSAIDs. These include ibuprofen and naproxen.      An ulcer can form in two areas of the digestive tract; the stomach and the duodenum (where the stomach meets the small intestine).   Common ulcer symptoms  Symptoms include:    Burning, cramping, or hunger-like pain in the stomach area, often 1 to 3 hours after a meal or in the middle of the night    Pain that gets better or worse with eating    Nausea or vomiting    Black, tarry, or bloody stools (which means the ulcer is bleeding)  Or you may have no symptoms.  Your evaluation  An evaluation by your healthcare provider can  show if you have an ulcer and determine whether it was caused by H. pylori. Your healthcare provider may ask you questions, examine you, and possibly do some tests. These may include:     A special X-ray called an upper gastrointestinal series, to help locate an ulcer. Before the test, you drink a chalky liquid, called barium. This liquid helps the ulcer show up on the X-ray.    An endoscopic exam, done with a long tube with a camera on the end. The tube is passed through your mouth into your stomach, and allows the healthcare provider to get a closer look at your ulcer. You will be lightly sedated for this procedure. Your healthcare provider can also take a tissue sample to test for H. pylori.    Blood, stool, and breath tests are also available to show whether you have H. pylori in your digestive tract.  Your treatment  To kill H. pylori so your ulcer can heal, your healthcare provider will probably prescribe antibiotics. Other ulcer medicines that help reduce stomach acid may also be prescribed as well. Testing after treatment may be recommended to be sure the H. pylori infection is gone. Usually, killing H. pylori helps keep the ulcer from returning. However, you can develop ulcers more than once in your lifetime.    [employer] last reviewed this educational content on 6/1/2019 2000-2021 [employer]. All rights reserved. This information is not intended as a substitute for professional medical care. Always follow your healthcare professional's instructions.

## 2021-09-13 ENCOUNTER — LAB (OUTPATIENT)
Dept: LAB | Facility: CLINIC | Age: 46
End: 2021-09-13
Payer: COMMERCIAL

## 2021-09-13 DIAGNOSIS — R10.13 EPIGASTRIC PAIN: ICD-10-CM

## 2021-09-13 PROCEDURE — 87338 HPYLORI STOOL AG IA: CPT

## 2021-09-14 LAB — H PYLORI AG STL QL IA: NEGATIVE

## 2021-10-09 ENCOUNTER — HEALTH MAINTENANCE LETTER (OUTPATIENT)
Age: 46
End: 2021-10-09

## 2021-10-12 ENCOUNTER — VIRTUAL VISIT (OUTPATIENT)
Dept: FAMILY MEDICINE | Facility: CLINIC | Age: 46
End: 2021-10-12
Payer: COMMERCIAL

## 2021-10-12 DIAGNOSIS — R42 VERTIGO: ICD-10-CM

## 2021-10-12 DIAGNOSIS — R73.03 PREDIABETES: ICD-10-CM

## 2021-10-12 DIAGNOSIS — I25.10 CORONARY ARTERY DISEASE INVOLVING NATIVE CORONARY ARTERY OF NATIVE HEART WITHOUT ANGINA PECTORIS: ICD-10-CM

## 2021-10-12 DIAGNOSIS — E78.5 HYPERLIPIDEMIA LDL GOAL <70: ICD-10-CM

## 2021-10-12 DIAGNOSIS — I10 ESSENTIAL HYPERTENSION WITH GOAL BLOOD PRESSURE LESS THAN 140/90: Primary | ICD-10-CM

## 2021-10-12 PROCEDURE — 99214 OFFICE O/P EST MOD 30 MIN: CPT | Mod: GT | Performed by: FAMILY MEDICINE

## 2021-10-12 RX ORDER — AMLODIPINE BESYLATE 5 MG/1
5 TABLET ORAL DAILY
Qty: 90 TABLET | Refills: 1 | Status: SHIPPED | OUTPATIENT
Start: 2021-10-12 | End: 2022-04-27

## 2021-10-12 RX ORDER — ATENOLOL 25 MG/1
25 TABLET ORAL DAILY
Start: 2021-10-12 | End: 2021-12-01

## 2021-10-12 NOTE — PATIENT INSTRUCTIONS
Patient Education     Benign Paroxysmal Positional Vertigo     Your health care provider may move your head in certain ways to treat your BPPV.   Benign paroxysmal positional vertigo (BPPV) is a problem with the inner ear. The inner ear contains the vestibular system. This system is what helps you keep your balance. BPPV causes a feeling of spinning. It is a common problem of the vestibular system.   Understanding the vestibular system  The vestibular system of the ear is made up of very tiny parts. They include the utricle, saccule, and semicircular canals. The utricle is a tiny organ that contains calcium crystals. In some people, the crystals can move into the semicircular canals. When this happens, the system no longer works as it should. This causes BPPV. Benign means it is not life threatening. Paroxysmal means it happens suddenly. Positional means that it happens when you move your head. Vertigo is a feeling of spinning.   What causes BPPV?  Causes include injury to your head or neck. Other problems with the vestibular system may cause BPPV. In many people, the cause of BPPV is not known.   Symptoms of BPPV  You may have repeated feelings of spinning (vertigo). The vertigo usually lasts less than 1 minute. Some movements, such as rolling over in bed, can bring on vertigo.   Diagnosing BPPV  Your primary healthcare provider may diagnose and treat your BPPV. Or you may see an ear, nose, and throat healthcare provider (otolaryngologist). In some cases, you may see a healthcare provider who focuses on the nervous system (neurologist).   The healthcare provider will ask about your symptoms and your medical history. He or she will examine you. You may have hearing and balance tests. As part of the exam, your healthcare provider may have you move your head and body in certain ways. If you have BPPV, the movements can bring on vertigo. Your provider will also look for abnormal movements of your eyes. You may have  other tests to check your vestibular or nervous systems.   Treatment for BPPV  Your healthcare provider may try to move the calcium crystals. This is done by having you move your head and neck in certain ways. This treatment is safe and often works well. You may also be told to do these movements at home. You may still have vertigo for a few weeks. Your healthcare provider will recheck your symptoms, usually in about a month. Special physical therapy may also be part of treatment. In rare cases, surgery may be needed for BPPV that does not go away. Talk with your healthcare provider about whether it is safe for you to drive.   When to call the healthcare provider  Call your healthcare provider right away if you have any of these:    Symptoms that do not go away with treatment    Symptoms that get worse    New symptoms  [employer]anda last reviewed this educational content on 2/1/2020 2000-2021 [employer]. All rights reserved. This information is not intended as a substitute for professional medical care. Always follow your healthcare professional's instructions.

## 2021-10-12 NOTE — PROGRESS NOTES
Micheal is a 46 year old who is being evaluated via a billable video visit.      How would you like to obtain your AVS? MyChart  If the video visit is dropped, the invitation should be resent by:   Will anyone else be joining your video visit? No    Video Start Time: 10:22 am    Assessment & Plan     (I10) Essential hypertension with goal blood pressure less than 140/90  (primary encounter diagnosis)  Comment: Historically at goal.  Best be to determine whether or not his dizziness symptom has anything to do with his blood pressure is to check it while he is symptomatic.  Plan: Basic metabolic panel, amLODIPine (NORVASC) 5         MG tablet, atenolol (TENORMIN) 25 MG tablet         Return in about 8 weeks (around 12/9/2021).      (R73.03) Prediabetes  Comment: Lab update  Plan: Hemoglobin A1c, Basic metabolic panel            (I25.10) Coronary artery disease involving native coronary artery of native heart without angina pectoris  (E78.5) Hyperlipidemia LDL goal <70  Comment: He has been prescribed a high-intensity statin, however he is not taking it, as he suspects he could be causing his dizziness symptom as well.  I feel this is unlikely  Plan: ALT        And I have encouraged the patient to resume his atorvastatin 80 mg.    (R42) Vertigo  Comment: After discussion today, it is still difficult to determine cause of his dizziness (suggesting possibilities include medication side effect, blood pressure changes, a component of his recurrent GI symptoms)  Plan: Other than ruling out hypotension and a true side effect from atorvastatin, there is no additional plan (not interested)      33 minutes spent on the date of the encounter doing chart review, review of test results, patient visit and documentation         Return in about 8 weeks (around 12/9/2021).    Lamin Parson MD  [facility]    Agnieszka Burton is a 46 year old who presents for the following health issues     HPI                  Dizziness      Duration: Couple weeks    Description   Feeling faint:  no   Feeling like the surroundings are moving: YES-day event 20 years  Plan, based on mother  Loss of consciousness or falls: no     Intensity: Variable    Accompanying signs and symptoms:   Nausea/vomitting: no   Palpitations: no   Weakness in arms or legs: no   Vision or speech changes: no   Ringing in ears (Tinnitus): no   Hearing loss related to dizziness: no   Other (fevers/chills/sweating/dyspnea): no            Review of Systems         Objective           Vitals:  No vitals were obtained today due to virtual visit.    Physical Exam   GENERAL: Healthy, alert and no distress  EYES: Eyes grossly normal to inspection.  No discharge or erythema, or obvious scleral/conjunctival abnormalities.  RESP: No audible wheeze, cough, or visible cyanosis.  No visible retractions or increased work of breathing.    SKIN: Visible skin clear. No significant rash, abnormal pigmentation or lesions.  NEURO: Cranial nerves grossly intact.  Mentation and speech appropriate for age.  PSYCH: Mentation appears normal, affect normal/bright, judgement and insight intact, normal speech and appearance well-groomed.    Lab on 09/13/2021   Component Date Value Ref Range Status     Helicobacter pylori Antigen Stool 09/13/2021 Negative  Negative Final    Negative for Helicobacter pylori antigen by enzyme immunoassay. A negative result indicates the absence of H. pylori antigen or that the level of antigen is below the level of detection.               Video-Visit Details    Type of service:  Video Visit    Video End Time:10:44 am    Originating Location (pt. Location): Home    Distant Location (provider location):  [facility]     Platform used for Video Visit: bluebird bio

## 2021-10-28 ENCOUNTER — LAB (OUTPATIENT)
Dept: LAB | Facility: CLINIC | Age: 46
End: 2021-10-28
Payer: COMMERCIAL

## 2021-10-28 DIAGNOSIS — E78.5 HYPERLIPIDEMIA LDL GOAL <70: ICD-10-CM

## 2021-10-28 DIAGNOSIS — I10 ESSENTIAL HYPERTENSION WITH GOAL BLOOD PRESSURE LESS THAN 140/90: ICD-10-CM

## 2021-10-28 DIAGNOSIS — I25.10 CORONARY ARTERY DISEASE INVOLVING NATIVE CORONARY ARTERY OF NATIVE HEART WITHOUT ANGINA PECTORIS: ICD-10-CM

## 2021-10-28 DIAGNOSIS — R73.03 PREDIABETES: ICD-10-CM

## 2021-10-28 DIAGNOSIS — E78.2 MIXED HYPERLIPIDEMIA: ICD-10-CM

## 2021-10-28 LAB
ALT SERPL W P-5'-P-CCNC: 55 U/L (ref 0–70)
ANION GAP SERPL CALCULATED.3IONS-SCNC: 2 MMOL/L (ref 3–14)
BUN SERPL-MCNC: 11 MG/DL (ref 7–30)
CALCIUM SERPL-MCNC: 8.9 MG/DL (ref 8.5–10.1)
CHLORIDE BLD-SCNC: 108 MMOL/L (ref 94–109)
CHOLEST SERPL-MCNC: 141 MG/DL
CO2 SERPL-SCNC: 29 MMOL/L (ref 20–32)
CREAT SERPL-MCNC: 0.88 MG/DL (ref 0.66–1.25)
FASTING STATUS PATIENT QL REPORTED: YES
GFR SERPL CREATININE-BSD FRML MDRD: >90 ML/MIN/1.73M2
GLUCOSE BLD-MCNC: 104 MG/DL (ref 70–99)
HBA1C MFR BLD: 6.2 % (ref 0–5.6)
HDLC SERPL-MCNC: 40 MG/DL
LDLC SERPL CALC-MCNC: 67 MG/DL
NONHDLC SERPL-MCNC: 101 MG/DL
POTASSIUM BLD-SCNC: 3.9 MMOL/L (ref 3.4–5.3)
SODIUM SERPL-SCNC: 139 MMOL/L (ref 133–144)
TRIGL SERPL-MCNC: 169 MG/DL

## 2021-10-28 PROCEDURE — 36415 COLL VENOUS BLD VENIPUNCTURE: CPT

## 2021-10-28 PROCEDURE — 80048 BASIC METABOLIC PNL TOTAL CA: CPT

## 2021-10-28 PROCEDURE — 83036 HEMOGLOBIN GLYCOSYLATED A1C: CPT

## 2021-10-28 PROCEDURE — 80061 LIPID PANEL: CPT

## 2021-10-28 PROCEDURE — 84460 ALANINE AMINO (ALT) (SGPT): CPT

## 2021-11-02 ENCOUNTER — VIRTUAL VISIT (OUTPATIENT)
Dept: FAMILY MEDICINE | Facility: CLINIC | Age: 46
End: 2021-11-02
Payer: COMMERCIAL

## 2021-11-02 DIAGNOSIS — R73.03 PREDIABETES: Primary | ICD-10-CM

## 2021-11-02 PROCEDURE — 99215 OFFICE O/P EST HI 40 MIN: CPT | Mod: GT | Performed by: FAMILY MEDICINE

## 2021-11-02 RX ORDER — METFORMIN HCL 500 MG
500 TABLET, EXTENDED RELEASE 24 HR ORAL
Qty: 90 TABLET | Refills: 3 | Status: SHIPPED | OUTPATIENT
Start: 2021-11-02 | End: 2022-10-29

## 2021-11-02 NOTE — PATIENT INSTRUCTIONS
Osteoarthritis of the hands    [facility]        Patient Education     What Is Osteoarthritis?  There are about 100 different types of arthritis. In general, arthritis means problems with the joints. A joint is a point in the body where 2 or more bones come together. Arthritis may also cause problems in the tissue near the joints, including muscles, tendons, and ligaments. And, in some types of arthritis, the entire body can be affected.   Osteoarthritis (OA) is sometimes called degenerative joint disease, or wear-and-tear arthritis. It's the most common type of arthritis. In OA, the cartilage wears away. Cartilage is a slick tissue that covers the ends of the bones. It acts as a cushion and allows them to glide smoothly against each other. When the cartilage wears away, bone rubs against bone. This causes pain, swelling, stiffness, and difficulty moving. Risk factors for developing OA include obesity, being older than 40, past joint trauma, concomitant inflammatory arthritis, repetitive joint use, and a family history of OA.      Normal knee      Knee with osteoarthritis   Symptoms  OA can affect any joint. Weight-bearing joints, such as the hips and knees, are often affected. Common symptoms are joint pain and stiffness. Pain and stiffness may get worse with inactivity or overuse. For example, you may have more stiffness first thing in the morning, usually for less than 30 minutes. Or you may have stiffness after sitting for a long time. This might be while sitting at a movie. You may also have more pain in your hips or knees if you walk farther than you normally do.   Other common symptoms are:    Weak muscles    Unstable or wobbly joints    Grinding or crackling noises with motion    Joints with swelling or bumps    Unable to bend and straighten joints (reduced range of motion)  If you have any of these joint changes, make an appointment to see your healthcare provider. The two of you can work together to  create a treatment plan that may help lessen your pain and stiffness and prevent symptoms from getting worse.   [employer] last reviewed this educational content on 3/1/2020    5938-5085 [employer]. All rights reserved. This information is not intended as a substitute for professional medical care. Always follow your healthcare professional's instructions.           Patient Education     Osteoarthritis: Common Sites  Osteoarthritis (OA) is sometimes called degenerative joint disease or wear-and-tear arthritis. It's the most common type of arthritis. In OA, the cartilage wears away. Cartilage covers the ends of bones and acts as a cushion. If enough cartilage wears away, bone rubs against bone. The joint changes in OA cause pain, stiffness, and trouble moving. OA may occur in any joint. Some joints that are commonly affected are the spine, neck,  hips, knees, fingers, and toes.  Neck    Joints between small bones in the neck may wear out. Pain may travel to the shoulder or the base of the skull.  Lumbar spine  Bony spurs may form on the joints between the vertebrae (spinal bones). And disks (cushions of cartilage between vertebrae) may wear down. Pain may affect the lower back or leg.  Hip  Cartilage damage can occur in the large  ball and socket  joint that connects the pelvis and thighbone (femur). Pain may travel to the groin, buttocks, or knee.  Knee  The cartilage in the knee joint may wear down. Weakness or instability in the knee joint may make walking or climbing stairs difficult.  Fingers  Finger joints may become enlarged and knobby. Grasping objects may be hard, especially if the joint at the base of the thumb is affected.  Toes  Toes may be affected. Arthritis may cause a bunion, a bump at the base of the big toe. Standing or walking may be painful.  [employer] last reviewed this educational content on 6/1/2018 2000-2021 [employer]. All rights reserved. This information is not  intended as a substitute for professional medical care. Always follow your healthcare professional's instructions.           Patient Education     Prediabetes  You have been diagnosed with prediabetes. This means that the level of sugar (glucose) in your blood is too high. If you have prediabetes, you are at risk for developing type 2 diabetes. Type 2 diabetes is diagnosed when the level of glucose in the blood reaches a certain high level. With prediabetes, it hasn t reached this point yet. But it's higher than normal. It is vital to make lifestyle changes to lower your blood sugar, improve your health, and prevent diabetes.       Why worry about prediabetes?  Prediabetes is a condition where the body s cells have trouble using glucose in the blood for energy. As a result, too much glucose stays in the blood. This can affect how your heart and blood vessels work. Without changes in diet and lifestyle, the problem can get worse. Once you have type 2 diabetes, it's ongoing (chronic). It needs to be managed for the rest of your life. Diabetes can harm the body and your health by damaging organs, such as your eyes and kidneys. It makes you more likely to have heart disease. And it can damage nerves and blood vessels.   Who is a risk for prediabetes?  The exact cause of prediabetes is not clear. But certain risk factors make a person more likely to have it. These include:     A family history of type 2 diabetes    Being overweight    Being older than age 45    Have high blood pressure or high cholesterol     Having had gestational diabetes    Not being physically active    Being ,  American, , , , or   Diagnosing prediabetes  Prediabetes may have no symptoms. Or you may have some of the symptoms of diabetes (see below). The diagnosis is made with a blood test. You may have 1 or more of these blood tests:      Fasting glucose test. Blood is taken  and tested after you have fasted (not eaten) for at least 8 hours. A normal test result is 99 milligrams per deciliter (mg/dL) or lower. Prediabetes is 100 mg/dL to 125 mg/dL. Diabetes is 126 mg/dL or higher.    Glucose tolerance test. Your blood sugar is measured before and after you drink a very sugary liquid. A normal test result is 139 milligrams per deciliter (mg/dL) or lower. Prediabetes is 140 mg/dL to 199 mg/dL. Diabetes is 200 mg/dL or higher.    Hemoglobin A1c (HbA1c). Your HbA1c is normal if it is below 5.7%. Prediabetes is 5.7% to 6.4%. Diabetes is 6.5% or higher.   Treating prediabetes  The best way to treat prediabetes is to lose at least 5% to 7% of your current weight and be more physically active by getting at least 150 minutes a week of physical activity (at least 30 minutes daily.) When sitting for long periods of time, get up for short sessions of light activity every 30 minutes. These changes help the body s cells use blood sugar better. Even a small amount of weight loss can help. Work with your healthcare provider to make a plan to eat well and be more active. Keep in mind that small changes can add up. Other changes in your lifestyle (or even taking certain medicines, such as metformin) may make you less likely to develop diabetes. Your provider can talk with you about these. Stopping smoking will decrease your risk of developing diabetes. Don't use e-cigarettes or vaping products. But you may gain some weight if you are not careful.   Ask your healthcare provider for a referral to a lifestyle intervention program. This program will help you get to and stay at a 7% weight loss and increase physical activity.   Follow-up  If not treated, prediabetes can turn into diabetes. This is a serious health condition. Take steps to stop this from happening. Follow the treatment plan you have been given. You may have your blood glucose tested again in about 12 to 18 months.   Diabetes symptoms   Let your  healthcare provider know if you have any of the following:    Always feel very tired    Feel very thirsty or hungry much of the time    Have to urinate often    Lose weight for no reason    Feel numbness or tingling in your fingers or toes    Have cuts or bruises that don t seem to heal    Have blurry vision  Randa last reviewed this educational content on 6/1/2019 2000-2021 [employer]. All rights reserved. This information is not intended as a substitute for professional medical care. Always follow your healthcare professional's instructions.

## 2021-11-02 NOTE — PROGRESS NOTES
Micheal is a 46 year old who is being evaluated via a billable video visit.      How would you like to obtain your AVS? MyChart  If the video visit is dropped, the invitation should be resent by:   Will anyone else be joining your video visit? No    Assessment & Plan     (R73.03) Prediabetes  (primary encounter diagnosis)  Comment: He has had impaired fasting glucose/prediabetes for some time, and his hemoglobin A1c is is a little higher this time.  He is interested in doing something more specific/definitive to reverse the problem.  I offered him to meet with the diabetes educators for prediabetes classes, to meet with the dietitian, or to start medication.  He has elected the latter.  Plan: metFORMIN (GLUCOPHAGE-XR) 500 MG 24 hr tablet        Recheck hemoglobin A1c at next visit:    Return in about 6 months (around 4/28/2022) for blood pressure check, lab tests.    67 minutes spent on the date of the encounter doing chart review, review of test results, patient visit and documentation        Lamin Parson MD  [facility]    Agnieszka Burton is a 46 year old who presents for the following health issues     HPI       The patient complains of having his labs drawn on 10/28/2021, and he wants to discuss the abnormal results and what to do about them.  Review of Systems         Objective           Vitals:  No vitals were obtained today due to virtual visit.    Physical Exam   GENERAL: Healthy, alert and no distress  EYES: Eyes grossly normal to inspection.  No discharge or erythema, or obvious scleral/conjunctival abnormalities.  RESP: No audible wheeze, cough, or visible cyanosis.  No visible retractions or increased work of breathing.    SKIN: Visible skin clear. No significant rash, abnormal pigmentation or lesions.  NEURO: Cranial nerves grossly intact.  Mentation and speech appropriate for age.  PSYCH: Mentation appears normal, affect normal/bright, judgement and insight intact,  normal speech and appearance well-groomed.    Lab on 10/28/2021   Component Date Value Ref Range Status     Hemoglobin A1C 10/28/2021 6.2* 0.0 - 5.6 % Final    Normal <5.7%   Prediabetes 5.7-6.4%    Diabetes 6.5% or higher     Note: Adopted from ADA consensus guidelines.     ALT 10/28/2021 55  0 - 70 U/L Final     Sodium 10/28/2021 139  133 - 144 mmol/L Final     Potassium 10/28/2021 3.9  3.4 - 5.3 mmol/L Final     Chloride 10/28/2021 108  94 - 109 mmol/L Final     Carbon Dioxide (CO2) 10/28/2021 29  20 - 32 mmol/L Final     Anion Gap 10/28/2021 2* 3 - 14 mmol/L Final     Urea Nitrogen 10/28/2021 11  7 - 30 mg/dL Final     Creatinine 10/28/2021 0.88  0.66 - 1.25 mg/dL Final     Calcium 10/28/2021 8.9  8.5 - 10.1 mg/dL Final     Glucose 10/28/2021 104* 70 - 99 mg/dL Final     GFR Estimate 10/28/2021 >90  >60 mL/min/1.73m2 Final    As of July 11, 2021, eGFR is calculated by the CKD-EPI creatinine equation, without race adjustment. eGFR can be influenced by muscle mass, exercise, and diet. The reported eGFR is an estimation only and is only applicable if the renal function is stable.     Cholesterol 10/28/2021 141  <200 mg/dL Final     Triglycerides 10/28/2021 169* <150 mg/dL Final     Direct Measure HDL 10/28/2021 40  >=40 mg/dL Final     LDL Cholesterol Calculated 10/28/2021 67  <=100 mg/dL Final     Non HDL Cholesterol 10/28/2021 101  <130 mg/dL Final     Patient Fasting > 8hrs? 10/28/2021 Yes   Final      Lab Results   Component Value Date    A1C 6.2 10/28/2021    A1C 5.7 10/03/2020    A1C 5.9 06/14/2020    A1C 5.8 01/30/2020    A1C 5.6 09/06/2019    A1C 6.0 04/02/2019                  Video-Visit Details    Type of service:  Video Visit  Video Start Time: 11:46 AM   Video End Time:12:16 PM    Originating Location (pt. Location): Home    Distant Location (provider location):  [facility]     Platform used for Video Visit: Yessenia

## 2021-11-03 ENCOUNTER — TELEPHONE (OUTPATIENT)
Dept: FAMILY MEDICINE | Facility: CLINIC | Age: 46
End: 2021-11-03
Payer: COMMERCIAL

## 2021-11-03 NOTE — TELEPHONE ENCOUNTER
Requesting /discontinued med:    zolpidem (AMBIEN) 5 MG tablet (Discontinued) 10 tablet 5 2021

## 2021-11-08 NOTE — TELEPHONE ENCOUNTER
This writer attempted to contact patient on 11/08/21      Reason for call medication and left message.      If patient calls back:   Registered Nurse called. Follow Triage Call workflow        Sally Muro RN

## 2021-11-09 NOTE — CONFIDENTIAL NOTE
Called and informed pharmacy of discontinued medication and to remove refill request and advised they tell  patient to contact his PCP office with any questions.      Nicole Dukes RN  [facility]

## 2021-11-11 PROBLEM — Z95.5 STATUS POST CORONARY ARTERY STENT PLACEMENT: Status: ACTIVE | Noted: 2019-10-23

## 2021-11-27 DIAGNOSIS — I10 ESSENTIAL HYPERTENSION WITH GOAL BLOOD PRESSURE LESS THAN 140/90: ICD-10-CM

## 2021-12-01 RX ORDER — ATENOLOL 25 MG/1
TABLET ORAL
Qty: 135 TABLET | Refills: 3 | Status: SHIPPED | OUTPATIENT
Start: 2021-12-01 | End: 2022-11-15 | Stop reason: ALTCHOICE

## 2021-12-06 DIAGNOSIS — R10.13 EPIGASTRIC PAIN: ICD-10-CM

## 2021-12-07 RX ORDER — PANTOPRAZOLE SODIUM 40 MG/1
40 TABLET, DELAYED RELEASE ORAL
Qty: 180 TABLET | Refills: 0 | Status: SHIPPED | OUTPATIENT
Start: 2021-12-07 | End: 2022-12-30

## 2022-01-09 ENCOUNTER — TELEPHONE (OUTPATIENT)
Dept: FAMILY MEDICINE | Facility: CLINIC | Age: 47
End: 2022-01-09
Payer: COMMERCIAL

## 2022-01-10 NOTE — TELEPHONE ENCOUNTER
Routing refill request to provider for review/approval because:  Drug not active on patient's medication list  Mackenzie Lawrence RN

## 2022-02-10 ENCOUNTER — LAB (OUTPATIENT)
Dept: URGENT CARE | Facility: URGENT CARE | Age: 47
End: 2022-02-10
Attending: FAMILY MEDICINE
Payer: COMMERCIAL

## 2022-02-10 DIAGNOSIS — Z20.822 CLOSE EXPOSURE TO 2019 NOVEL CORONAVIRUS: ICD-10-CM

## 2022-02-10 PROCEDURE — U0003 INFECTIOUS AGENT DETECTION BY NUCLEIC ACID (DNA OR RNA); SEVERE ACUTE RESPIRATORY SYNDROME CORONAVIRUS 2 (SARS-COV-2) (CORONAVIRUS DISEASE [COVID-19]), AMPLIFIED PROBE TECHNIQUE, MAKING USE OF HIGH THROUGHPUT TECHNOLOGIES AS DESCRIBED BY CMS-2020-01-R: HCPCS

## 2022-02-10 PROCEDURE — U0005 INFEC AGEN DETEC AMPLI PROBE: HCPCS

## 2022-02-11 LAB — SARS-COV-2 RNA RESP QL NAA+PROBE: NEGATIVE

## 2022-03-01 ENCOUNTER — OFFICE VISIT (OUTPATIENT)
Dept: CARDIOLOGY | Facility: CLINIC | Age: 47
End: 2022-03-01
Payer: COMMERCIAL

## 2022-03-01 VITALS
WEIGHT: 151 LBS | HEART RATE: 54 BPM | OXYGEN SATURATION: 100 % | SYSTOLIC BLOOD PRESSURE: 122 MMHG | BODY MASS INDEX: 24.12 KG/M2 | DIASTOLIC BLOOD PRESSURE: 71 MMHG

## 2022-03-01 DIAGNOSIS — Z95.5 STATUS POST CORONARY ARTERY STENT PLACEMENT: Primary | ICD-10-CM

## 2022-03-01 DIAGNOSIS — E08.00 DIABETES MELLITUS DUE TO UNDERLYING CONDITION WITH HYPEROSMOLARITY WITHOUT COMA, WITHOUT LONG-TERM CURRENT USE OF INSULIN (H): ICD-10-CM

## 2022-03-01 DIAGNOSIS — I10 BENIGN ESSENTIAL HYPERTENSION: ICD-10-CM

## 2022-03-01 PROCEDURE — 99215 OFFICE O/P EST HI 40 MIN: CPT | Performed by: INTERNAL MEDICINE

## 2022-03-01 NOTE — PATIENT INSTRUCTIONS
The following is a summary of your office visit today with Dr. Ariana Betancourt:       Diagnosis:  Coronary artery disease      Recommendations:  Atenolol take 12.5 mg at bedtime  OK to take atorvastatin 40 mg at bedtime    Follow up:  1 year      If you have had any blood work, imaging or other testing completed we will be in touch within 1-2 weeks regarding the results. If you have any questions, concerns or need to schedule a follow up, please contact us at [phone] If you are needing refills please contact your pharmacy. For urgent after hour care please call the [facility] at [phone] or the [facility] at [phone] and ask to speak to the cardiologist on call.    It was a pleasure meeting with you today. Please let us know if there is anything else we can do for you so that we can be sure you are leaving completely satisfied with your care experience.     Your Cardiology Team at [facility]  Phone: [phone] Fax: [phone]  RN Care Coordinators: Zaire  Support Staff: Wendi             HOW TO CHECK YOUR BLOOD PRESSURE AT HOME:     Avoid eating, smoking, and exercising for at least 30 minutes before taking a reading.    Be sure you have taken your BP medication at least 2-3 hours before you check it.     Sit quietly for 10 minutes before a reading.     Sit in a chair with your feet flat on the floor. Rest your  arm on a table so that the arm cuff is at the same level as your heart.    Remain still during the reading.    Record your blood pressure and pulse in a log and bring to your next appointment.

## 2022-03-01 NOTE — PROGRESS NOTES
Micheal Husain's goals for this visit include: Check in on his condition. Result of the MRI. He didn't talk with anyone afterward and had concerned. Has had issues sleeping.     He requests these members of his care team be copied on today's visit information: PCP    PCP: Dr Parson    Referring Provider:  Referred Self, MD  No address on file    /71 (BP Location: Left arm, Patient Position: Sitting, Cuff Size: Adult Regular)   Pulse 54   Wt 68.5 kg (151 lb)   SpO2 100%   BMI 24.12 kg/m      Do you need any medication refills at today's visit?     Jose Falcon, EMT  Clinic Support  [facility]    [phone]    Employed by [facility]

## 2022-03-01 NOTE — PROGRESS NOTES
I am delighted to see Micheal Husain as a new patient in [facility] for evaluation of coronary artery disease.  I am seeing patient in Dr. Workman's absence.    History of Present Illness:  The patient is a 46 year old  Male with CAD, LCx stent in 2019, last seen by Dr. Yayo Workman in July 2021. At that time, patient had been doing some light jogging and reported chest pressure after 2-5 minutes of exercise, resolved after 5 minutes of rest. Dr. Workman ordered a cardiac stress MRI, with the plan of proceeding to coronary angiogram if positive for ischemia. The stress test was normal and patient was notified of the results. He is here today asking for some in person follow up. He is here with his wife.    He has been well since last July and feels reassured by the results of the stress test. He is not jogging any more, but does try to get on the treadmill up to 4 times a week, speed 3-3.5 mph, for 2 miles at a time. He denies any chest heaviness or dyspnea on the treadmill.  He does report that he had significant fatigue and noted resting heart rates in the high 40s while he was taking atenolol 37.5 every day, and his PCP had gradually reduced the dose to 12.5 mg every day. Dr. Workman had increased his atorvastatin from 40 to 80 at bedtime after the stress test. He reports significant fatigue, dry mouth on the higher dose so he's now back to 40 mg and his symptoms are resolved. He quit smoking in 2019, had not resumed.    Past Medical History:  1. CAD, PCI of  distal LCx 10/23/2019; recurrent CP 11/15/2019 - angiogram showed patent LCx stent, 80% small OM1, distal PDA disease, medical therapy.  2. Hypertension  3. Diabetes type II    Medications:   Atenolol 12.5 mg qd  Amlodipine 5 mg every day  Atorvastatin 40 every day  Metformin  qd      Intolerances:  hydrochlorothiazide - hyponatremia  Atorvastatin 80 - severe dry mouth and fatigue    Allergies:  none    Physical  examination  Vitals: 122/71 HR 54  BMI= 24.6 (68.5 kg)    Constitutional: In general, the patient is a pleasant male in no acute distress.    Cardiovascular: Carotids +2/2 bilaterally without bruits.  No jugular venous distension. Regular rate and rhythm. Normal S1, S2. No murmur, rub, click, or gallop.   Extremities: Pulses are normal bilaterally throughout. No peripheral edema.  Respiratory: Clear to asculation.  No ronchi, wheezes, rales.  No dullness to percussion.     I have personally and independently reviewed the following:  Labs:   10/28/2021: chol 141, HDL 40, LDL 67, , K 3.9, cr 0.88, A1C 6.2    EK2021: sinus 52 bpm, normal intervals    Stress MRI  2021: LVEF 59%, RVEF 54$, no valve disease, no fibrosis, no ischemia    Assessment :  Coronary artery disease s/p stent to LCx. Preserved LV function. He is doing well without ischemic symptoms, at a moderate level of activity. He has fatigue on atenolol and dose has been reduced by PCP. I suggest taking atenolol at bedtime to see that can further minimize fatigue. He has intolerance to higher dose of atorvastatin at 80 every day and is now back on 40 at bedtime which I assured him is acceptable.      Plan:  Follow up 1 year with Dr. Workamn, or sooner if chest pressure recurs.      I spent a total of 30 minutes face to face with  Micheal Husain during today's office visit. I have spend an additional 15 minutes today on chart review and documentation.    The patient is to return as above . The patient understood the treatment plan as outlined above.  There were no barriers to learning.      Ariana Betancourt MD

## 2022-03-26 ENCOUNTER — HEALTH MAINTENANCE LETTER (OUTPATIENT)
Age: 47
End: 2022-03-26

## 2022-09-17 ENCOUNTER — HEALTH MAINTENANCE LETTER (OUTPATIENT)
Age: 47
End: 2022-09-17

## 2022-10-19 DIAGNOSIS — I25.10 CORONARY ARTERY DISEASE INVOLVING NATIVE CORONARY ARTERY OF NATIVE HEART WITHOUT ANGINA PECTORIS: ICD-10-CM

## 2022-10-19 DIAGNOSIS — E78.2 MIXED HYPERLIPIDEMIA: ICD-10-CM

## 2022-10-19 DIAGNOSIS — I10 ESSENTIAL HYPERTENSION WITH GOAL BLOOD PRESSURE LESS THAN 140/90: ICD-10-CM

## 2022-10-19 DIAGNOSIS — R73.03 PREDIABETES: ICD-10-CM

## 2022-10-24 DIAGNOSIS — E78.2 MIXED HYPERLIPIDEMIA: ICD-10-CM

## 2022-10-24 NOTE — TELEPHONE ENCOUNTER
Reason for call:  Medication   If this is a refill request, has the caller requested the refill from the pharmacy already? No  Will the patient be using a [facility]? No  Name of the pharmacy and phone number for the current request: [facility] [place]     Name of the medication requested: see below    Other request: n/a    Phone number to reach patient:  Cell number on file:    Telephone Information:   Mobile [phone]       Best Time:  Anytime     Can we leave a detailed message on this number?  YES    Travel screening: Not Applicable

## 2022-10-26 NOTE — TELEPHONE ENCOUNTER
LIPITOR 80MG  Last Written Prescription Date:  7/13/21  Last Fill Quantity: 90,   # refills: 3  Last Office Visit : 3/1/22  Future Office visit:  NONE  Routing refill request to provider for review/approval because:  CLARIFICATION ? LAST NOTE :Atorvastatin 40 every day  RF GAP  WHAT DOSE IS PT TAKING?IS PT SPLITTING TAB?

## 2022-10-27 RX ORDER — ATORVASTATIN CALCIUM 80 MG/1
TABLET, FILM COATED ORAL
Qty: 90 TABLET | Refills: 3 | OUTPATIENT
Start: 2022-10-27

## 2022-10-29 RX ORDER — ATORVASTATIN CALCIUM 80 MG/1
80 TABLET, FILM COATED ORAL DAILY
Qty: 90 TABLET | Refills: 3 | OUTPATIENT
Start: 2022-10-29

## 2022-10-29 RX ORDER — ATENOLOL 25 MG/1
TABLET ORAL
Qty: 135 TABLET | Refills: 3 | OUTPATIENT
Start: 2022-10-29

## 2022-10-29 RX ORDER — METFORMIN HCL 500 MG
500 TABLET, EXTENDED RELEASE 24 HR ORAL
Qty: 90 TABLET | Refills: 0 | Status: SHIPPED | OUTPATIENT
Start: 2022-10-29 | End: 2022-12-30

## 2022-11-03 ENCOUNTER — IMMUNIZATION (OUTPATIENT)
Dept: FAMILY MEDICINE | Facility: CLINIC | Age: 47
End: 2022-11-03
Payer: COMMERCIAL

## 2022-11-03 DIAGNOSIS — Z23 NEED FOR PROPHYLACTIC VACCINATION AND INOCULATION AGAINST INFLUENZA: ICD-10-CM

## 2022-11-03 DIAGNOSIS — Z23 HIGH PRIORITY FOR 2019-NCOV VACCINE: ICD-10-CM

## 2022-11-03 PROCEDURE — 90686 IIV4 VACC NO PRSV 0.5 ML IM: CPT

## 2022-11-03 PROCEDURE — 0124A COVID-19,PF,PFIZER BOOSTER BIVALENT: CPT

## 2022-11-03 PROCEDURE — 99207 PR NO CHARGE NURSE ONLY: CPT

## 2022-11-03 PROCEDURE — 90471 IMMUNIZATION ADMIN: CPT

## 2022-11-03 PROCEDURE — 91312 COVID-19,PF,PFIZER BOOSTER BIVALENT: CPT

## 2022-11-03 NOTE — PROGRESS NOTES
Micheal Husain      1.  Has the patient received the information for the influenza vaccine? YES    2.  Does the patient have any of the following contraindications?     Allergy to eggs? No     Allergic reaction to previous influenza vaccines? No     Any other problems to previous influenza vaccines? No     Paralyzed by Guillain-[place] syndrome? No     Currently pregnant? NO     Current moderate or severe illness? No     Allergy to contact lens solution? No    3.  The vaccine has been administered in the usual fashion and the patient was instructed to wait 20 minutes before leaving the building in the event of an allergic reaction: YES    Vaccination given by Cathryn Astudillo RN.  Recorded by Cathryn Astudillo RN

## 2022-11-13 DIAGNOSIS — I10 ESSENTIAL HYPERTENSION WITH GOAL BLOOD PRESSURE LESS THAN 140/90: ICD-10-CM

## 2022-11-13 DIAGNOSIS — I25.10 CORONARY ARTERY DISEASE INVOLVING NATIVE CORONARY ARTERY OF NATIVE HEART WITHOUT ANGINA PECTORIS: ICD-10-CM

## 2022-11-14 RX ORDER — AMLODIPINE BESYLATE 5 MG/1
5 TABLET ORAL DAILY
Qty: 90 TABLET | Refills: 0 | Status: SHIPPED | OUTPATIENT
Start: 2022-11-14 | End: 2022-11-15

## 2022-11-14 NOTE — PROGRESS NOTES
I am delighted to see Micheal Husain in [facility] for evaluation of coronary artery disease.  I am seeing the patient in Dr. Workman's absence.    History of Present Illness:  The patient is a 47 year old male with CAD s/p PCI/LCx stent in 2019, last seen by Dr. Yayo Workman in July 2021, and by me in March 2022. At the time of follow up with Dr Workman, the patient had been doing some light jogging and reported chest pressure after 2-5 minutes of exercise, resolved after 5 minutes of rest. Dr. Workman ordered a cardiac stress MRI, with the plan of proceeding to coronary angiogram if positive for ischemia. The stress test was normal and patient was notified of the results.     On interview today, the patient reports persistent chest heaviness with exertion. He denies dyspnea on exertion. He walks 2 miles a day about 4 times a week.  Regularly checks his blood pressure at home, systolic BP 120s to 130s, diastolic BP 70-80's. He had previously reported fatigue and noted resting heart rates in the high 40s while he was taking atenolol 37.5 every day, and his PCP had gradually reduced the dose to 12.5 mg every day. Dr. Workman had increased his atorvastatin from 40 to 80 at bedtime after the stress test. He reported significant fatigue, dry mouth on the higher dose so he came back down to 40 mg with resolution of symptoms. He quit smoking in 2019.     Past Medical History:  1. CAD, PCI of  distal LCx 10/23/2019; recurrent CP 11/15/2019 - angiogram showed patent LCx stent, 80% small OM1, distal PDA disease, medical therapy.  2. Hypertension  3. Diabetes type II    Medications:   Atenolol 12.5 mg qd  Amlodipine 5 mg every day  Atorvastatin 40 every day  Metformin  every day  ASA 81 mg daily    Intolerances:  hydrochlorothiazide - hyponatremia  Atorvastatin 80 - severe dry mouth and fatigue    Allergies:  none    Physical examination  Vitals: 122/71 HR 54  BMI= 24.6 (68.5 kg)    Constitutional:  In general, the patient is a pleasant male in no acute distress.    Cardiovascular: Carotids +2/2 bilaterally without bruits.  No jugular venous distension. Regular rate and rhythm. Normal S1, S2. No murmur, rub, click, or gallop.   Extremities: Pulses are normal bilaterally throughout. No peripheral edema.  Respiratory: Clear to asculation.  No ronchi, wheezes, rales.  No dullness to percussion.     Labs: I have personally and independently reviewed the following:  10/28/2021: chol 141, HDL 40, LDL 67, , K 3.9, cr 0.88, A1C 6.2    EK2021: sinus 52 bpm, normal intervals    Stress MRI  2021: LVEF 59%, RVEF 54$, no valve disease, no fibrosis, no ischemia    Assessment :  Coronary artery disease s/p PCI/LCx stent. Preserved LV function. He has recurrent chest tightness with exertion and is very nervous about the possiblity of this being ischemic in etiology.     Plan:  1. Continue aspirin 81 Mg daily and Lipitor 40 Mg daily.  2. Repeat stress cardiac MRI to assess for ischemia.   3. Stop Atenolol 12.5 mg nightly. Continue home BP checks- can consider uptitrating Norvasc from 5 to 10 mg daily if BP increases after stopping Atenolol.   4. Follow up: pending stress cMRI results.    I spent a total of 30 minutes face to face with  Micheal Husain during today's office visit. I have spend an additional 15 minutes today on chart review and documentation.    The patient is to return as above . The patient understood the treatment plan as outlined above.  There were no barriers to learning.    Chica Dorman MD,   Cardiovascular Disease Fellow  Pager [phone]      Chica Dorman MD

## 2022-11-15 ENCOUNTER — OFFICE VISIT (OUTPATIENT)
Dept: CARDIOLOGY | Facility: CLINIC | Age: 47
End: 2022-11-15
Payer: COMMERCIAL

## 2022-11-15 VITALS
OXYGEN SATURATION: 100 % | SYSTOLIC BLOOD PRESSURE: 152 MMHG | HEART RATE: 65 BPM | WEIGHT: 152.9 LBS | DIASTOLIC BLOOD PRESSURE: 82 MMHG | BODY MASS INDEX: 24.43 KG/M2

## 2022-11-15 DIAGNOSIS — I10 ESSENTIAL HYPERTENSION WITH GOAL BLOOD PRESSURE LESS THAN 140/90: ICD-10-CM

## 2022-11-15 DIAGNOSIS — R07.89 OTHER CHEST PAIN: Primary | ICD-10-CM

## 2022-11-15 PROCEDURE — 99214 OFFICE O/P EST MOD 30 MIN: CPT | Mod: GC | Performed by: INTERNAL MEDICINE

## 2022-11-15 RX ORDER — AMLODIPINE BESYLATE 10 MG/1
10 TABLET ORAL DAILY
Qty: 60 TABLET | Refills: 1 | Status: SHIPPED | OUTPATIENT
Start: 2022-11-15 | End: 2022-12-30

## 2022-11-15 NOTE — NURSING NOTE
Micheal Husain's goals for this visit include:   Chief Complaint   Patient presents with     Follow Up       He requests these members of his care team be copied on today's visit information: yes     PCP: No Ref-Primary, Physician    Referring Provider:  Referred Self, MD  No address on file    BP (!) 152/82 (BP Location: Left arm, Patient Position: Chair, Cuff Size: Adult Regular)   Pulse 65   Wt 69.4 kg (152 lb 14.4 oz)   SpO2 100%   BMI 24.43 kg/m      Do you need any medication refills at today's visit? No       ESTEFANY Obrien   Cardiology Team  [facility]

## 2022-11-15 NOTE — PATIENT INSTRUCTIONS
The following is a summary of your office visit today:    Stop atenolol  Increase amlodipine to 10 once a day  Keep checking blood pressure at home  Stress cardiac MRI for chest pain  Follow up with Dr. Monet 6 months      Nurse contact information:  Cardiology Care Coordinators:  Marianne Riggs, RN and Claire Marrero, RN   Phone  [phone]    Fax [phone]       HOW TO CHECK YOUR BLOOD PRESSURE AT HOME:     Avoid eating, smoking, and exercising for at least 30 minutes before taking a reading.    Be sure you have taken your BP medication at least 2-3 hours before you check it.     Sit quietly for 10 minutes before a reading.     Sit in a chair with your feet flat on the floor. Rest your  arm on a table so that the arm cuff is at the same level as your heart.    Remain still during the reading.    Record your blood pressure and pulse in a log and bring to your next appointment.     If you have had any blood work, imaging or other testing completed we will be in touch within 1-2 weeks regarding the results. If you have any questions, concerns or need to schedule a follow up, please contact us at [phone]. If you are needing refills please contact your pharmacy. For urgent after hour care please call the [facility] at [phone] or the [facility] at [phone] and ask to speak to the cardiologist on call.    It was a pleasure meeting with you today. Please let us know if there is anything else we can do for you so that we can be sure you are leaving completely satisfied with your care experience.     Your Cardiology Team at [facility]  RN Care Coordinators: Zaire  Support Staff: Martha

## 2022-11-18 DIAGNOSIS — E78.2 MIXED HYPERLIPIDEMIA: ICD-10-CM

## 2022-11-18 NOTE — TELEPHONE ENCOUNTER
"Requested Prescriptions   Pending Prescriptions Disp Refills     atorvastatin (LIPITOR) 80 MG tablet 90 tablet 3     Sig: Take 1 tablet (80 mg) by mouth daily       Statins Protocol Failed - 11/18/2022  1:53 PM        Failed - LDL on file in past 12 months     Recent Labs   Lab Test 10/28/21  0728   LDL 67             Passed - No abnormal creatine kinase in past 12 months     No lab results found.             Passed - Recent (12 mo) or future (30 days) visit within the authorizing provider's specialty     Patient has had an office visit with the authorizing provider or a provider within the authorizing providers department within the previous 12 mos or has a future within next 30 days. See \"Patient Info\" tab in inbasket, or \"Choose Columns\" in Meds & Orders section of the refill encounter.              Passed - Medication is active on med list        Passed - Patient is age 18 or older             Routing refill request to provider for review/approval because:  Labs out of range: cholesterol    Left message that refill was pending    CAREN Vogel      "

## 2022-11-18 NOTE — TELEPHONE ENCOUNTER
[facility] Call Center    Phone Message    May a detailed message be left on voicemail: yes     Reason for Call: Medication Refill Request    Has the patient contacted the pharmacy for the refill? Yes / Pharmacy requesting new Rx with no responce  Name of medication being requested:   atorvastatin (LIPITOR) 80 MG tablet 90 tablet 3 7/13/2021  --   Sig - Route: Take 1 tablet (80 mg) by mouth daily        Provider who prescribed the medication: Dr Workman    Pharmacy: [facility] [id number] [address]    Date medication is needed: Patient is out       Action Taken: Other: Cardiology    Travel Screening: Not Applicable

## 2022-11-21 RX ORDER — ATORVASTATIN CALCIUM 80 MG/1
40 TABLET, FILM COATED ORAL DAILY
Qty: 90 TABLET | Refills: 0 | Status: SHIPPED | OUTPATIENT
Start: 2022-11-21 | End: 2022-12-30

## 2022-11-21 NOTE — TELEPHONE ENCOUNTER
Ariana Betancourt MD Balcome, Debbie A, RN  Caller: Unspecified (3 days ago, 12:53 PM)  He said he's only taking 40 every day. Ok to refill. I'm having him see gen cards in 6 months       Refill sent for 6 months.     Marianne Riggs, RN  Cardiology Care Coordinator  [facility]  Phone: [phone]

## 2022-11-22 ENCOUNTER — TELEPHONE (OUTPATIENT)
Dept: FAMILY MEDICINE | Facility: CLINIC | Age: 47
End: 2022-11-22
Payer: COMMERCIAL

## 2022-11-22 PROCEDURE — 99207 PR NO CHARGE NURSE ONLY: CPT | Performed by: FAMILY MEDICINE

## 2022-12-25 NOTE — TELEPHONE ENCOUNTER
aspirin 81 MG EC tablet      Last Written Prescription Date:  10/1/2019  Last Fill Quantity: 90,   # refills: 3  Last Office Visit : 12/2/2019  Future Office visit:  12/19/2020  90 Tabs, 3 Refills sent to pharm 9/12/2020      Maggy Waters RN  Central Triage Red Flags/Med Refills    
Discharged

## 2022-12-28 NOTE — TELEPHONE ENCOUNTER
Occupational Therapy    Visit Type: treatment  Precautions:  Medical precautions:  fall risk; standard precautions.  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  Lines:     Basic: telemetry      Lines in chart and on patient reviewed, precautions maintained throughout session.  Safety Measures: bed alarm and bed rails  SUBJECTIVE  Patient agreed to participate in therapy this date.  \"Am I done, can I get back up in the bed\"  Patient / Family Goal: return home    Pain     At onset of session (out of 10): 0  RN informed on pain level     OBJECTIVE        Standing Balance  (SOUTH = base of support)  Firm Surface: Double Leg      - Static, Eyes Open       - Trial 1 details: stand by assist, with single UE support and with double UE support       Transfers  Assistive devices: 2-wheeled walker  - Sit to stand: stand by assist  - Stand to sit: stand by assist      Functional Ambulation  - Assistance: stand by assist and contact guard/touching/steadying assist  - Assistive device: 2-wheeled walker  - Distance (ft):10; 10  - Surface: even  Activities of Daily Living (ADLs)  Lower Body Dressing:   - Assist: moderate assist (underwear)  - Position: chair and sitting at sink  - Assist needed for: thread left lower extremity into underwear and thread right lower extremity into underwear  Toileting:   - Toilet transfer:        - Assist: contact guard/touching/steadying assist and stand by assist       - Device: 2-wheeled walker       - Equipment: grab bar use  - Assist: stand by assist and supervision  - Assist needed for: increased time to complete  - Equipment: grab bar use (GM support)  Bathing:   - Assist: stand by assist and with verbal cues (UB only)  - Position: sitting at sink  Bathing equipment: shower chair.  Pt with rectal bleeding with toileting- RN aware. Pt also with hemmorhoids requiring self-assist to push back in in standing, forward flexion      Interventions    Training provided: activity tolerance, ADL  Pending Prescriptions:                       Disp   Refills    hydrochlorothiazide (HYDRODIURIL) 25 MG ta*90 tab*3        Sig: TAKE 1 TABLET BY MOUTH EVERY DAY    Routing refill request to provider for review/approval because:  Labs out of range:  Sodium    Yumiko Davies, BSN, RN, PHN           training, balance retraining, safety training and transfer training  Skilled input: verbal instruction/cues  Verbal Consent: Writer verbally educated and received verbal consent for hand placement, positioning of patient, and techniques to be performed today from patient for therapist position for techniques as described above and how they are pertinent to the patient's plan of care.         ASSESSMENT  Impairments: balance, activity tolerance, strength, cognitive and desire/motivation  Functional Limitations: bed mobility, functional mobility, grooming, toileting, dressing, showering, functional transfers, bathing, IADLs and participating in meaningful/purposeful activities       Discharge Recommendations:  Recommendations for Discharge: OT IL: Patient requires 24 HOUR assistance to perform mobility and/or ADLs safely, Patient is appropriate for Occupational Therapy 1-3 times per week  PT/OT Mobility Equipment for Discharge: RW  PT/OT ADL Equipment for Discharge: shower chair without arms (d/w dtr present), RW      • Skilled therapy is required to address these limitations in attempt to maximize the patient's independence.    Education:   - Present and ready to learn: patient  Education provided during session:  - Results of above outlined education: Needs reinforcement  Pain at End of Session:    Pain: 0/10    Patient at End of Session:   Location: in chair  Safety measures: alarm system in place/re-engaged, call light within reach, equipment intact and lines intact  Handoff to: nurse    PLAN  Suggestions for next session as indicated: sinkside bathing  Standing endurance  Distal LBD with AE kit (pt with arthritic hips limiting ROM at times)  Interventions: activity tolerance training, ADL retraining, balance, therapeutic activity, upper extremity strengthening/ROM, use of adaptive equipment, safety training, therapeutic exercise, transfer training, energy conservation, functional transfer training, compensatory  technique education and compensatory techniques  Agreement to plan and goals: patient agrees with goals and treatment plan      GOALS  Review Date: 12/28/2022  Long Term Goals: (to be met by time of discharge from hospital)  Grooming: Patient will complete grooming tasks in standing supervision.  Lower body dressing: Patient will complete lower body dressing in standing and in sitting supervision.  Toileting: Patient will complete toileting supervision.  Stand (even surface): Patient will stand on even surface for x5 minutes, stand by assist.   Status: progressing/ongoing  Toilet transfer: Patient will complete toilet transfer with 2-wheeled walker, stand by assist.  Status: progressing/ongoing        Documented in the chart in the following areas: Assessment. Plan.      Therapy procedure time and total treatment time can be found documented on the Time Entry flowsheet

## 2022-12-30 ENCOUNTER — OFFICE VISIT (OUTPATIENT)
Dept: FAMILY MEDICINE | Facility: CLINIC | Age: 47
End: 2022-12-30
Payer: COMMERCIAL

## 2022-12-30 VITALS
DIASTOLIC BLOOD PRESSURE: 66 MMHG | WEIGHT: 153.4 LBS | HEIGHT: 67 IN | TEMPERATURE: 97.7 F | OXYGEN SATURATION: 99 % | SYSTOLIC BLOOD PRESSURE: 108 MMHG | BODY MASS INDEX: 24.08 KG/M2 | RESPIRATION RATE: 16 BRPM | HEART RATE: 62 BPM

## 2022-12-30 DIAGNOSIS — Z00.00 ROUTINE GENERAL MEDICAL EXAMINATION AT A HEALTH CARE FACILITY: Primary | ICD-10-CM

## 2022-12-30 DIAGNOSIS — E78.5 HYPERLIPIDEMIA LDL GOAL <70: ICD-10-CM

## 2022-12-30 DIAGNOSIS — I25.10 CORONARY ARTERY DISEASE INVOLVING NATIVE CORONARY ARTERY OF NATIVE HEART WITHOUT ANGINA PECTORIS: ICD-10-CM

## 2022-12-30 DIAGNOSIS — R73.03 PREDIABETES: ICD-10-CM

## 2022-12-30 DIAGNOSIS — G47.00 INSOMNIA, UNSPECIFIED TYPE: ICD-10-CM

## 2022-12-30 DIAGNOSIS — Z23 NEED FOR VACCINATION: ICD-10-CM

## 2022-12-30 DIAGNOSIS — Z12.11 SCREEN FOR COLON CANCER: ICD-10-CM

## 2022-12-30 DIAGNOSIS — I10 ESSENTIAL HYPERTENSION WITH GOAL BLOOD PRESSURE LESS THAN 140/90: ICD-10-CM

## 2022-12-30 DIAGNOSIS — K21.9 GASTROESOPHAGEAL REFLUX DISEASE WITHOUT ESOPHAGITIS: ICD-10-CM

## 2022-12-30 LAB
ALT SERPL W P-5'-P-CCNC: 50 U/L (ref 0–70)
ANION GAP SERPL CALCULATED.3IONS-SCNC: 5 MMOL/L (ref 3–14)
BUN SERPL-MCNC: 13 MG/DL (ref 7–30)
CALCIUM SERPL-MCNC: 9.6 MG/DL (ref 8.5–10.1)
CHLORIDE BLD-SCNC: 104 MMOL/L (ref 94–109)
CHOLEST SERPL-MCNC: 139 MG/DL
CK SERPL-CCNC: 279 U/L (ref 30–300)
CO2 SERPL-SCNC: 30 MMOL/L (ref 20–32)
CREAT SERPL-MCNC: 0.79 MG/DL (ref 0.66–1.25)
CREAT UR-MCNC: 139 MG/DL
FASTING STATUS PATIENT QL REPORTED: YES
GFR SERPL CREATININE-BSD FRML MDRD: >90 ML/MIN/1.73M2
GLUCOSE BLD-MCNC: 108 MG/DL (ref 70–99)
HBA1C MFR BLD: 6 % (ref 0–5.6)
HDLC SERPL-MCNC: 45 MG/DL
LDLC SERPL CALC-MCNC: 66 MG/DL
MICROALBUMIN UR-MCNC: 10 MG/L
MICROALBUMIN/CREAT UR: 7.19 MG/G CR (ref 0–17)
NONHDLC SERPL-MCNC: 94 MG/DL
POTASSIUM BLD-SCNC: 4.1 MMOL/L (ref 3.4–5.3)
SODIUM SERPL-SCNC: 139 MMOL/L (ref 133–144)
TRIGL SERPL-MCNC: 142 MG/DL

## 2022-12-30 PROCEDURE — 82550 ASSAY OF CK (CPK): CPT | Performed by: FAMILY MEDICINE

## 2022-12-30 PROCEDURE — 80061 LIPID PANEL: CPT | Performed by: FAMILY MEDICINE

## 2022-12-30 PROCEDURE — 99396 PREV VISIT EST AGE 40-64: CPT | Mod: 25 | Performed by: FAMILY MEDICINE

## 2022-12-30 PROCEDURE — 36415 COLL VENOUS BLD VENIPUNCTURE: CPT | Performed by: FAMILY MEDICINE

## 2022-12-30 PROCEDURE — 90746 HEPB VACCINE 3 DOSE ADULT IM: CPT | Performed by: FAMILY MEDICINE

## 2022-12-30 PROCEDURE — 80048 BASIC METABOLIC PNL TOTAL CA: CPT | Performed by: FAMILY MEDICINE

## 2022-12-30 PROCEDURE — 99214 OFFICE O/P EST MOD 30 MIN: CPT | Mod: 25 | Performed by: FAMILY MEDICINE

## 2022-12-30 PROCEDURE — 90471 IMMUNIZATION ADMIN: CPT | Performed by: FAMILY MEDICINE

## 2022-12-30 PROCEDURE — 82570 ASSAY OF URINE CREATININE: CPT | Performed by: FAMILY MEDICINE

## 2022-12-30 PROCEDURE — 82043 UR ALBUMIN QUANTITATIVE: CPT | Performed by: FAMILY MEDICINE

## 2022-12-30 PROCEDURE — 90472 IMMUNIZATION ADMIN EACH ADD: CPT | Performed by: FAMILY MEDICINE

## 2022-12-30 PROCEDURE — 90677 PCV20 VACCINE IM: CPT | Performed by: FAMILY MEDICINE

## 2022-12-30 PROCEDURE — 83036 HEMOGLOBIN GLYCOSYLATED A1C: CPT | Performed by: FAMILY MEDICINE

## 2022-12-30 PROCEDURE — 90632 HEPA VACCINE ADULT IM: CPT | Performed by: FAMILY MEDICINE

## 2022-12-30 PROCEDURE — 84460 ALANINE AMINO (ALT) (SGPT): CPT | Performed by: FAMILY MEDICINE

## 2022-12-30 RX ORDER — PANTOPRAZOLE SODIUM 40 MG/1
40 TABLET, DELAYED RELEASE ORAL
Qty: 90 TABLET | Refills: 3 | Status: SHIPPED | OUTPATIENT
Start: 2022-12-30 | End: 2023-04-28

## 2022-12-30 RX ORDER — ATORVASTATIN CALCIUM 80 MG/1
40 TABLET, FILM COATED ORAL DAILY
Qty: 90 TABLET | Refills: 0 | Status: CANCELLED | OUTPATIENT
Start: 2022-12-30

## 2022-12-30 RX ORDER — METFORMIN HCL 500 MG
500 TABLET, EXTENDED RELEASE 24 HR ORAL
Qty: 90 TABLET | Refills: 3 | Status: SHIPPED | OUTPATIENT
Start: 2022-12-30 | End: 2023-12-28

## 2022-12-30 RX ORDER — ATORVASTATIN CALCIUM 80 MG/1
80 TABLET, FILM COATED ORAL DAILY
Qty: 90 TABLET | Refills: 0 | Status: SHIPPED | OUTPATIENT
Start: 2022-12-30 | End: 2023-04-25

## 2022-12-30 RX ORDER — DOXEPIN HYDROCHLORIDE 10 MG/1
10 CAPSULE ORAL AT BEDTIME
Qty: 90 CAPSULE | Refills: 3 | Status: SHIPPED | OUTPATIENT
Start: 2022-12-30 | End: 2024-01-30

## 2022-12-30 RX ORDER — AMLODIPINE BESYLATE 10 MG/1
10 TABLET ORAL DAILY
Qty: 90 TABLET | Refills: 3 | Status: SHIPPED | OUTPATIENT
Start: 2022-12-30 | End: 2024-01-30

## 2022-12-30 RX ORDER — DOXEPIN HYDROCHLORIDE 10 MG/ML
SOLUTION ORAL AT BEDTIME
Status: CANCELLED | OUTPATIENT
Start: 2022-12-30

## 2022-12-30 ASSESSMENT — ENCOUNTER SYMPTOMS
HEMATOCHEZIA: 0
SHORTNESS OF BREATH: 0
CHILLS: 0
WEAKNESS: 0
PARESTHESIAS: 0
ARTHRALGIAS: 0
NAUSEA: 0
COUGH: 0
FEVER: 0
PALPITATIONS: 0
DIARRHEA: 0
SORE THROAT: 0
HEARTBURN: 0
HEMATURIA: 0
DIZZINESS: 0
EYE PAIN: 0
ABDOMINAL PAIN: 0
MYALGIAS: 0
CONSTIPATION: 0
DYSURIA: 0
HEADACHES: 0
FREQUENCY: 0
JOINT SWELLING: 0
NERVOUS/ANXIOUS: 0

## 2022-12-30 ASSESSMENT — PAIN SCALES - GENERAL: PAINLEVEL: NO PAIN (0)

## 2022-12-30 NOTE — PATIENT INSTRUCTIONS
Preventive Health Recommendations  Male Ages 40 to 49    Yearly exam:             See your health care provider every year in order to  o   Review health changes.   o   Discuss preventive care.    o   Review your medicines if your doctor has prescribed any.  You should be tested each year for STDs (sexually transmitted diseases) if you re at risk.   Have a cholesterol test every 5 years.   Have a colonoscopy (test for colon cancer) if someone in your family has had colon cancer or polyps before age 50.   After age 45, have a diabetes test (fasting glucose). If you are at risk for diabetes, you should have this test every 3 years.    Talk with your health care provider about whether or not a prostate cancer screening test (PSA) is right for you.    Shots: Get a flu shot each year. Get a tetanus shot every 10 years.     Nutrition:  Eat at least 5 servings of fruits and vegetables daily.   Eat whole-grain bread, whole-wheat pasta and brown rice instead of white grains and rice.   Get adequate Calcium and Vitamin D.     Lifestyle  Exercise for at least 150 minutes a week (30 minutes a day, 5 days a week). This will help you control your weight and prevent disease.   Limit alcohol to one drink per day.   No smoking.   Wear sunscreen to prevent skin cancer.   See your dentist every six months for an exam and cleaning.      You can receive the second Hepatitis B vaccine as early as 4-8 weeks from now (approximately 2/10/23). Then you can receive the final dose in the fall with your flu shot.

## 2022-12-30 NOTE — PROGRESS NOTES
"SUBJECTIVE:   CC: Micheal is an 47 year old who presents for preventative health visit.     Healthy Habits:     Getting at least 3 servings of Calcium per day:  Yes    Bi-annual eye exam:  Yes    Dental care twice a year:  Yes    Sleep apnea or symptoms of sleep apnea:  None    Diet:  Regular (no restrictions)    Frequency of exercise:  2-3 days/week    Duration of exercise:  Less than 15 minutes    Taking medications regularly:  Yes    Medication side effects:  Not applicable    PHQ-2 Total Score: 0    Additional concerns today:  No      The patient reports concerns with sleeping issues and is interested in trying a new sleep medication. He was previously prescribed zolpidem, which he reports did not work well for him. He notes that he is able to fall asleep relatively well, but frequently has trouble staying asleep and usually wakes up after 2-3 hours.    The patient reports that he was switched to 40 mg atorvastatin instead of 80 mg because he thought the medication was possibly making him feel \"drained\". However, he did not notice a difference when switching to 40 mg so he is interested in returning to the 80 mg dose, since he has known CAD.    The patient complains of occasional feelings of acid reflux and would like to begin taking pantoprazole again.        Today's PHQ-2 Score:   PHQ-2 ( 1999 [employer]) 12/30/2022   Q1: Little interest or pleasure in doing things 0   Q2: Feeling down, depressed or hopeless 0   PHQ-2 Score 0   PHQ-2 Total Score (12-17 Years)- Positive if 3 or more points; Administer PHQ-A if positive -   Q1: Little interest or pleasure in doing things Not at all   Q2: Feeling down, depressed or hopeless Not at all   PHQ-2 Score 0       Have you ever done Advance Care Planning? (For example, a Health Directive, POLST, or a discussion with a medical provider or your loved ones about your wishes): No, advance care planning information given to patient to review.  Patient declined advance care " "planning discussion at this time.    Social History     Tobacco Use     Smoking status: Former     Packs/day: 0.20     Years: 8.00     Pack years: 1.60     Types: Cigarettes     Smokeless tobacco: Former     Quit date: 10/1/2019     Tobacco comments:     smoked less than 10 years, 3-5 cigarettes/day   Substance Use Topics     Alcohol use: Never         Alcohol Use 12/30/2022   Prescreen: >3 drinks/day or >7 drinks/week? Not Applicable       Last PSA:   PSA   Date Value Ref Range Status   09/21/2020 0.46 0 - 4 ug/L Final     Comment:     Assay Method:  Chemiluminescence using Siemens Vista analyzer         Reviewed and updated as needed this visit by clinical staff    Allergies  Meds              Reviewed and updated as needed this visit by Provider                     Review of Systems   Constitutional: Negative for chills and fever.   HENT: Negative for congestion, ear pain, hearing loss and sore throat.    Eyes: Negative for pain and visual disturbance.   Respiratory: Negative for cough and shortness of breath.    Cardiovascular: Negative for chest pain, palpitations and peripheral edema.   Gastrointestinal: Negative for abdominal pain, constipation, diarrhea, heartburn, hematochezia and nausea.   Genitourinary: Negative for dysuria, frequency, genital sores, hematuria, impotence, penile discharge and urgency.   Musculoskeletal: Negative for arthralgias, joint swelling and myalgias.   Skin: Negative for rash.   Neurological: Negative for dizziness, weakness, headaches and paresthesias.   Psychiatric/Behavioral: Negative for mood changes. The patient is not nervous/anxious.          OBJECTIVE:   /66 (BP Location: Left arm, Patient Position: Chair, Cuff Size: Adult Regular)   Pulse 62   Temp 97.7  F (36.5  C) (Tympanic)   Resp 16   Ht 1.702 m (5' 7\")   Wt 69.6 kg (153 lb 6.4 oz)   SpO2 99%   BMI 24.03 kg/m      Physical Exam  GENERAL: healthy, alert and no distress  EYES: Eyes grossly normal to " inspection, PERRL and conjunctivae and sclerae normal  HENT: ear canals and TM's normal, nose and mouth without ulcers or lesions  NECK: no adenopathy, no asymmetry, masses, or scars and thyroid normal to palpation  RESP: lungs clear to auscultation - no rales, rhonchi or wheezes  CV: regular rate and rhythm, normal S1 S2, no S3 or S4, no murmur, click or rub, no peripheral edema and peripheral pulses strong  ABDOMEN: soft, nontender, no hepatosplenomegaly, no masses and bowel sounds normal   (male): normal male genitalia without lesions or urethral discharge, no hernia  MS: no gross musculoskeletal defects noted, no edema  SKIN: no suspicious lesions or rashes  NEURO: Normal strength and tone, mentation intact and speech normal. Reflexes normal and symmetric in the upper and lower extremities.   PSYCH: mentation appears normal, affect normal/bright         ASSESSMENT/PLAN:   (Z00.00) Routine general medical examination at a health care facility  (primary encounter diagnosis)  Comment: Negative screening exam; up-to-date on preventive services.   Plan: Pneumococcal 20 Valent Conjugate (Prevnar 20),         HEPATITIS B VACCINE ADULT 3 DOSE IM         (ENGERIX-B/RECOMBIVAX HB), HEPATITIS A VACCINE         (ADULT)        Return in about 1 year (around 12/30/2023) for full physical, blood pressure check, lab tests.      (I25.10) Coronary artery disease involving native coronary artery of native heart without angina pectoris  Comment: clinically stable, on a high-intensity statin. He requests to increase the dose back to 80 mg.  Plan: Lipid panel reflex to direct LDL Non-fasting,         aspirin (ASPIRIN LOW DOSE) 81 MG EC tablet,         ALT, CK total, atorvastatin (LIPITOR) 80 MG         tablet        Cardiac imaging scheduled for 1/26/23. Follow up with Cardiology scheduled for 5/3/23.    (E78.5) Hyperlipidemia LDL goal <70  Comment: Fasting. Usually treated by Cardiology. He needs his labs updated.  Plan: Lipid  panel reflex to direct LDL Non-fasting,         ALT, CK total, atorvastatin (LIPITOR) 80 MG         tablet            (I10) Essential hypertension with goal blood pressure less than 140/90  Comment: well controlled   Plan: Albumin Random Urine Quantitative with Creat         Ratio, BASIC METABOLIC PANEL, amLODIPine         (NORVASC) 10 MG tablet        Return in about 1 year (around 12/30/2023) for full physical, blood pressure check, lab tests.      (R73.03) Prediabetes  Comment: He has been on metformin since 2017.  Plan: BASIC METABOLIC PANEL, metFORMIN (GLUCOPHAGE         XR) 500 MG 24 hr tablet, Hemoglobin A1c            (G47.00) Insomnia, unspecified type  Comment: maintenance insomnia, failed zolpidem  Plan: doxepin (SINEQUAN) 10 MG capsule        follow up as needed    (K21.9) Gastroesophageal reflux disease without esophagitis  Comment:   Plan: pantoprazole (PROTONIX) 40 MG EC tablet            (Z12.11) Screen for colon cancer  Comment:   Plan: Colonoscopy Screening  Referral            (Z23) Need for vaccination  Comment:   Plan: Pneumococcal 20 Valent Conjugate (Prevnar 20),         HEPATITIS B VACCINE ADULT 3 DOSE IM         (ENGERIX-B/RECOMBIVAX HB), HEPATITIS A VACCINE         (ADULT)        Hep B #2 4-8 weeks from now.      COUNSELING:   Reviewed preventive health counseling, as reflected in patient instructions  Special attention given to:        Regular exercise: The patient reports that in the summer his exercise consists of regular walking. In the winter he walks on the treadmill, but less consistently (3-4 days a week).       Immunizations    Vaccinated for: Hepatitis A, Hepatitis B and Pneumococcal             Aspirin prophylaxis        Colorectal cancer screening        He reports that he has quit smoking. His smoking use included cigarettes. He has a 1.60 pack-year smoking history. He quit smokeless tobacco use about 3 years ago.        Lamin Parson MD  [facility]  ELVER VIZCARRA    This document serves as a record of the services and decisions personally performed and made by Dr. Parson. It was created on his behalf by Martinez Perla, a trained medical scribe. The creation of this document is based the provider's statements to the medical scribe.  Martinez Perla,  8:25 AM

## 2022-12-30 NOTE — NURSING NOTE
Prior to immunization administration, verified patients identity using patient s name and date of birth. Please see Immunization Activity for additional information.     Screening Questionnaire for Adult Immunization    Are you sick today?   No   Do you have allergies to medications, food, a vaccine component or latex?   No   Have you ever had a serious reaction after receiving a vaccination?   No   Do you have a long-term health problem with heart, lung, kidney, or metabolic disease (e.g., diabetes), asthma, a blood disorder, no spleen, complement component deficiency, a cochlear implant, or a spinal fluid leak?  Are you on long-term aspirin therapy?   No   Do you have cancer, leukemia, HIV/AIDS, or any other immune system problem?   No   Do you have a parent, brother, or sister with an immune system problem?   No   In the past 3 months, have you taken medications that affect  your immune system, such as prednisone, other steroids, or anticancer drugs; drugs for the treatment of rheumatoid arthritis, Crohn s disease, or psoriasis; or have you had radiation treatments?   No   Have you had a seizure, or a brain or other nervous system problem?   No   During the past year, have you received a transfusion of blood or blood    products, or been given immune (gamma) globulin or antiviral drug?   No   For women: Are you pregnant or is there a chance you could become       pregnant during the next month?   No   Have you received any vaccinations in the past 4 weeks?   No     Immunization questionnaire answers were all negative.        Per orders of Dr. Parson, injection of Hep A, Hep B, and PCV20 given by Veronica Watts RN. Patient instructed to remain in clinic for 15 minutes afterwards, and to report any adverse reaction to me immediately.       Screening performed by Veronica Watts RN on 12/30/2022 at 8:09 AM.

## 2023-01-26 ENCOUNTER — HOSPITAL ENCOUNTER (OUTPATIENT)
Dept: MRI IMAGING | Facility: CLINIC | Age: 48
Discharge: HOME | End: 2023-01-26
Attending: INTERNAL MEDICINE | Admitting: INTERNAL MEDICINE
Payer: COMMERCIAL

## 2023-01-26 VITALS — OXYGEN SATURATION: 99 % | HEART RATE: 56 BPM | SYSTOLIC BLOOD PRESSURE: 124 MMHG | DIASTOLIC BLOOD PRESSURE: 85 MMHG

## 2023-01-26 DIAGNOSIS — R07.89 OTHER CHEST PAIN: ICD-10-CM

## 2023-01-26 DIAGNOSIS — I25.10 CORONARY ARTERY DISEASE INVOLVING NATIVE CORONARY ARTERY OF NATIVE HEART WITHOUT ANGINA PECTORIS: Primary | ICD-10-CM

## 2023-01-26 PROCEDURE — 75563 CARD MRI W/STRESS IMG & DYE: CPT

## 2023-01-26 PROCEDURE — A9585 GADOBUTROL INJECTION: HCPCS | Performed by: INTERNAL MEDICINE

## 2023-01-26 PROCEDURE — 250N000011 HC RX IP 250 OP 636: Performed by: INTERNAL MEDICINE

## 2023-01-26 PROCEDURE — 93010 ELECTROCARDIOGRAM REPORT: CPT | Mod: 76 | Performed by: INTERNAL MEDICINE

## 2023-01-26 PROCEDURE — 93005 ELECTROCARDIOGRAM TRACING: CPT

## 2023-01-26 PROCEDURE — 93018 CV STRESS TEST I&R ONLY: CPT | Performed by: INTERNAL MEDICINE

## 2023-01-26 PROCEDURE — 75563 CARD MRI W/STRESS IMG & DYE: CPT | Mod: 26 | Performed by: INTERNAL MEDICINE

## 2023-01-26 PROCEDURE — 93016 CV STRESS TEST SUPVJ ONLY: CPT | Performed by: INTERNAL MEDICINE

## 2023-01-26 PROCEDURE — 255N000002 HC RX 255 OP 636: Performed by: INTERNAL MEDICINE

## 2023-01-26 RX ORDER — DIAZEPAM 5 MG
5 TABLET ORAL EVERY 30 MIN PRN
Status: DISCONTINUED | OUTPATIENT
Start: 2023-01-26 | End: 2023-01-27 | Stop reason: HOSPADM

## 2023-01-26 RX ORDER — ONDANSETRON 2 MG/ML
4 INJECTION INTRAMUSCULAR; INTRAVENOUS
Status: DISCONTINUED | OUTPATIENT
Start: 2023-01-26 | End: 2023-01-27 | Stop reason: HOSPADM

## 2023-01-26 RX ORDER — ACYCLOVIR 200 MG/1
0-1 CAPSULE ORAL
Status: DISCONTINUED | OUTPATIENT
Start: 2023-01-26 | End: 2023-01-27 | Stop reason: HOSPADM

## 2023-01-26 RX ORDER — GADOBUTROL 604.72 MG/ML
10 INJECTION INTRAVENOUS ONCE
Status: COMPLETED | OUTPATIENT
Start: 2023-01-26 | End: 2023-01-26

## 2023-01-26 RX ORDER — REGADENOSON 0.08 MG/ML
0.4 INJECTION, SOLUTION INTRAVENOUS ONCE
Status: COMPLETED | OUTPATIENT
Start: 2023-01-26 | End: 2023-01-26

## 2023-01-26 RX ORDER — METHYLPREDNISOLONE SODIUM SUCCINATE 125 MG/2ML
125 INJECTION, POWDER, LYOPHILIZED, FOR SOLUTION INTRAMUSCULAR; INTRAVENOUS
Status: DISCONTINUED | OUTPATIENT
Start: 2023-01-26 | End: 2023-01-27 | Stop reason: HOSPADM

## 2023-01-26 RX ORDER — AMINOPHYLLINE 25 MG/ML
100 INJECTION, SOLUTION INTRAVENOUS ONCE
Status: COMPLETED | OUTPATIENT
Start: 2023-01-26 | End: 2023-01-26

## 2023-01-26 RX ORDER — ALBUTEROL SULFATE 90 UG/1
2 AEROSOL, METERED RESPIRATORY (INHALATION) EVERY 5 MIN PRN
Status: DISCONTINUED | OUTPATIENT
Start: 2023-01-26 | End: 2023-01-27 | Stop reason: HOSPADM

## 2023-01-26 RX ORDER — DIPHENHYDRAMINE HCL 25 MG
25 CAPSULE ORAL
Status: DISCONTINUED | OUTPATIENT
Start: 2023-01-26 | End: 2023-01-27 | Stop reason: HOSPADM

## 2023-01-26 RX ORDER — DIPHENHYDRAMINE HYDROCHLORIDE 50 MG/ML
25-50 INJECTION INTRAMUSCULAR; INTRAVENOUS
Status: DISCONTINUED | OUTPATIENT
Start: 2023-01-26 | End: 2023-01-27 | Stop reason: HOSPADM

## 2023-01-26 RX ORDER — CAFFEINE CITRATE 20 MG/ML
60 SOLUTION INTRAVENOUS
Status: DISCONTINUED | OUTPATIENT
Start: 2023-01-26 | End: 2023-01-27 | Stop reason: HOSPADM

## 2023-01-26 RX ADMIN — AMINOPHYLLINE 100 MG: 25 INJECTION, SOLUTION INTRAVENOUS at 11:05

## 2023-01-26 RX ADMIN — GADOBUTROL 10 ML: 604.72 INJECTION INTRAVENOUS at 11:00

## 2023-01-26 RX ADMIN — GADOBUTROL 10 ML: 604.72 INJECTION INTRAVENOUS at 10:59

## 2023-01-26 RX ADMIN — REGADENOSON 0.4 MG: 0.08 INJECTION, SOLUTION INTRAVENOUS at 11:00

## 2023-01-26 NOTE — PROGRESS NOTES
Patient presents for cardiac stress MRI and denies caffeine consumption in the past 12 hours.  Patient is educated on procedure for cardiac stress MRI including the medications that will be used and their possible side effects.  Lungs are clear bilaterally.  Patient tolerated the Lexiscan injection reporting SOB which patient states has resolved by three minutes post injection.  Aminophylline is given per protocol and patient is monitored x 10 mn, then is left under the care of MRI staff.

## 2023-01-27 LAB
ATRIAL RATE - MUSE: 54 BPM
ATRIAL RATE - MUSE: 56 BPM
DIASTOLIC BLOOD PRESSURE - MUSE: NORMAL MMHG
DIASTOLIC BLOOD PRESSURE - MUSE: NORMAL MMHG
INTERPRETATION ECG - MUSE: NORMAL
INTERPRETATION ECG - MUSE: NORMAL
P AXIS - MUSE: 62 DEGREES
P AXIS - MUSE: 74 DEGREES
PR INTERVAL - MUSE: 184 MS
PR INTERVAL - MUSE: 190 MS
QRS DURATION - MUSE: 94 MS
QRS DURATION - MUSE: 94 MS
QT - MUSE: 386 MS
QT - MUSE: 400 MS
QTC - MUSE: 366 MS
QTC - MUSE: 386 MS
R AXIS - MUSE: 48 DEGREES
R AXIS - MUSE: 74 DEGREES
SYSTOLIC BLOOD PRESSURE - MUSE: NORMAL MMHG
SYSTOLIC BLOOD PRESSURE - MUSE: NORMAL MMHG
T AXIS - MUSE: 41 DEGREES
T AXIS - MUSE: 76 DEGREES
VENTRICULAR RATE- MUSE: 54 BPM
VENTRICULAR RATE- MUSE: 56 BPM

## 2023-02-01 ENCOUNTER — OFFICE VISIT (OUTPATIENT)
Dept: DERMATOLOGY | Facility: CLINIC | Age: 48
End: 2023-02-01

## 2023-02-01 DIAGNOSIS — L82.1 DERMATOSIS PAPULOSA NIGRA: Primary | ICD-10-CM

## 2023-02-01 PROCEDURE — 96999 UNLISTED SPEC DERM SVC/PX: CPT | Performed by: PHYSICIAN ASSISTANT

## 2023-02-01 ASSESSMENT — PAIN SCALES - GENERAL: PAINLEVEL: NO PAIN (0)

## 2023-02-01 NOTE — NURSING NOTE
Micheal Husain's chief complaint for this visit includes:  Chief Complaint   Patient presents with     Derm Problem     Hyfercator for lesions on face.      PCP: No Ref-Primary, Physician    Referring Provider:  No referring provider defined for this encounter.    There were no vitals taken for this visit.  No Pain (0)        Allergies   Allergen Reactions     Hydrochlorothiazide Other (See Comments) and Fatigue     hyponatremia     Metoprolol Other (See Comments)     ED         Do you need any medication refills at today's visit?  No .    Kimberlyn Klein LPN

## 2023-02-01 NOTE — PROGRESS NOTES
[facility] Dermatology Note  Encounter Date: Feb 1, 2023  Office Visit     Dermatology Problem List:  1. Dermatosis papulosa nigra - bilateral cheeks, temples, anterior neck  - s/p cosmetic destruction with Hyrecator  ____________________________________________    Assessment & Plan:    # Dermatosis papulosa nigra - right lateral cheek x  6, left lateral cheek x 6, anterior neck x 2. Previously treated by Hyfrecator in 2019, patient would like further treatment.   - See procedure note below.  - Patient agrees to pay $150 for destruction of 14 lesions.    Procedures Performed:   Electrodesiccation: After verbal consent and discussion of risks and benefits were discussed with the patient including the possibility of scar and change in pigmentation, 14 lesions were treated with a hyfrecator on setting 3. Post treatment instructions were verbally provided.       Follow-up: prn for new or changing lesions    Staff and Scribe:     Scribe Disclosure:   I, Wally Reese, am serving as a scribe to document services personally performed by this physician, Akila Baires PA-C, based on data collection and the provider's statements to me.   Provider Disclosure:   The documentation recorded by the scribe accurately reflects the services I personally performed and the decisions made by me.    All risks, benefits and alternatives were discussed with patient.  Patient is in agreement and understands the assessment and plan.  All questions were answered.    Akila Baires PA-C, MPAS  [facility] [place]: Phone: [phone], Fax: [phone]  [facility] [place]: Phone: [phone],  Fax: [phone]  [facility]: Phone: [phone], Fax: [phone]    ____________________________________________    CC: Derm Problem (Hyfercator for lesions on face. )    HPI:  Mr. Micheal Husain is a(n) 47 year old male who  presents today as a return patient for cosmetic destruction.    Last seen 7/2/19 by Dr. Geronimo for cosmetic treatment of dermatosis papulosa nigra.     Today, he would like numerous lesions on the cheeks and neck cosmetically removed.     Patient is otherwise feeling well, without additional skin concerns.    Labs Reviewed:  N/A    Physical Exam:  Vitals: There were no vitals taken for this visit.  SKIN: Focused examination of face was performed.  - There are scattered small  waxy stuck on tan to brown papules on the bilateral cheeks and bilateral anterior neck, consistent with DPN.  - No other lesions of concern on areas examined.     Medications:  Current Outpatient Medications   Medication     amLODIPine (NORVASC) 10 MG tablet     aspirin (ASPIRIN LOW DOSE) 81 MG EC tablet     atorvastatin (LIPITOR) 80 MG tablet     doxepin (SINEQUAN) 10 MG capsule     metFORMIN (GLUCOPHAGE XR) 500 MG 24 hr tablet     nitroGLYcerin (NITROSTAT) 0.4 MG sublingual tablet     pantoprazole (PROTONIX) 40 MG EC tablet     No current facility-administered medications for this visit.      Past Medical History:   Patient Active Problem List   Diagnosis     Family history of ischemic heart disease     Hyperlipidemia LDL goal <70     Prediabetes     Essential hypertension with goal blood pressure less than 140/90     Coronary artery disease involving native coronary artery of native heart without angina pectoris     Hypertensive kidney disease, stage 1     Status post coronary artery stent placement     Insomnia, unspecified type     Gastroesophageal reflux disease without esophagitis     Past Medical History:   Diagnosis Date     Hyperlipidemia      Hypertension      Pre-diabetes

## 2023-02-01 NOTE — PATIENT INSTRUCTIONS
Wound Care:  Electrodesiccation and Curettage     I will experience scar, altered skin color, bleeding, swelling, pain, crusting and redness. I may experience altered sensation. Risks are excessive bleeding, infection, muscle weakness, thick (hypertrophic or keloidal) scar, and recurrence,. A second procedure may be recommended to obtain the best cosmetic or functional result.    What is electrodesiccation and curettage ?  Scraping off tissue (curettage)  Destroy tissue using electric current or cautery (electrodessication)    How do I perform wound care?  Keep dressing in place for two days. You may shower with the dressing in place(do not get wet)  After 2 days, wash hands and remove dressing. Clean wound with cotton-swab soaked in hydrogen peroxide to remove drainage and crust  Put on a thick layer of Vaseline on the wound using a cotton-swab   Cover the wound with a Band-AidTM to protect from dust and tight clothing  If wound is draining before two days, change your dressing as described above sooner  During wound care, do not allow the area to dry out or form a scab    What do I need?  Hydrogen peroxide   Cotton-swabs   Vaseline or petroleum jelly   Band-AidsTM or dressing supplies as needed     When should I call the doctor?  [facility]: [phone]  [facility]: [phone]  For urgent needs outside of business hours call the [facility] at [phone] and ask for the dermatology resident on call

## 2023-02-01 NOTE — LETTER
2/1/2023         RE: Micheal Husain  [address]  [place] [address]        Dear Colleague,    Thank you for referring your patient, Micheal Husain, to the [facility]. Please see a copy of my visit note below.    [facility] Dermatology Note  Encounter Date: Feb 1, 2023  Office Visit     Dermatology Problem List:  1. Dermatosis papulosa nigra - bilateral cheeks, temples, anterior neck  - s/p cosmetic destruction with Hyrecator  ____________________________________________    Assessment & Plan:    # Dermatosis papulosa nigra - right lateral cheek x  6, left lateral cheek x 6, anterior neck x 2. Previously treated by Hyfrecator in 2019, patient would like further treatment.   - See procedure note below.  - Patient agrees to pay $150 for destruction of 14 lesions.    Procedures Performed:   Electrodesiccation: After verbal consent and discussion of risks and benefits were discussed with the patient including the possibility of scar and change in pigmentation, 14 lesions were treated with a hyfrecator on setting 3. Post treatment instructions were verbally provided.       Follow-up: prn for new or changing lesions    Staff and Scribe:     Scribe Disclosure:   I, Wally Reese, am serving as a scribe to document services personally performed by this physician, Akila Baires PA-C, based on data collection and the provider's statements to me.   Provider Disclosure:   The documentation recorded by the scribe accurately reflects the services I personally performed and the decisions made by me.    All risks, benefits and alternatives were discussed with patient.  Patient is in agreement and understands the assessment and plan.  All questions were answered.    Akila Baires PA-C, MPAS  [facility] [place]: Phone: [phone], Fax: [phone]  [facility]: Phone: [phone],   Fax: [phone]  [facility]: Phone: [phone], Fax: [phone]    ____________________________________________    CC: Derm Problem (Hyfercator for lesions on face. )    HPI:  Mr. Micheal Husain is a(n) 47 year old male who presents today as a return patient for cosmetic destruction.    Last seen 7/2/19 by Dr. Geronimo for cosmetic treatment of dermatosis papulosa nigra.     Today, he would like numerous lesions on the cheeks and neck cosmetically removed.     Patient is otherwise feeling well, without additional skin concerns.    Labs Reviewed:  N/A    Physical Exam:  Vitals: There were no vitals taken for this visit.  SKIN: Focused examination of face was performed.  - There are scattered small  waxy stuck on tan to brown papules on the bilateral cheeks and bilateral anterior neck, consistent with DPN.  - No other lesions of concern on areas examined.     Medications:  Current Outpatient Medications   Medication     amLODIPine (NORVASC) 10 MG tablet     aspirin (ASPIRIN LOW DOSE) 81 MG EC tablet     atorvastatin (LIPITOR) 80 MG tablet     doxepin (SINEQUAN) 10 MG capsule     metFORMIN (GLUCOPHAGE XR) 500 MG 24 hr tablet     nitroGLYcerin (NITROSTAT) 0.4 MG sublingual tablet     pantoprazole (PROTONIX) 40 MG EC tablet     No current facility-administered medications for this visit.      Past Medical History:   Patient Active Problem List   Diagnosis     Family history of ischemic heart disease     Hyperlipidemia LDL goal <70     Prediabetes     Essential hypertension with goal blood pressure less than 140/90     Coronary artery disease involving native coronary artery of native heart without angina pectoris     Hypertensive kidney disease, stage 1     Status post coronary artery stent placement     Insomnia, unspecified type     Gastroesophageal reflux disease without esophagitis     Past Medical History:   Diagnosis Date     Hyperlipidemia      Hypertension      Pre-diabetes              Again, thank you for allowing me to participate in the care of your patient.        Sincerely,        Akila Baires PA-C

## 2023-02-14 NOTE — TELEPHONE ENCOUNTER
.Reason for call:  Other   Patient called regarding (reason for call): prescription  Additional comments: omeprazole and plavix are going to have a reaction. Pharmacy calling to inform provider and make sure it's okay.     Phone number to reach patient:  Home number on file [phone] (home)    Best Time:  anytime    Can we leave a detailed message on this number?  YES    Travel screening: Negative   Patient

## 2023-04-28 ENCOUNTER — OFFICE VISIT (OUTPATIENT)
Dept: OPHTHALMOLOGY | Facility: CLINIC | Age: 48
End: 2023-04-28
Payer: COMMERCIAL

## 2023-04-28 DIAGNOSIS — H52.4 PRESBYOPIA: Primary | ICD-10-CM

## 2023-04-28 PROCEDURE — 92015 DETERMINE REFRACTIVE STATE: CPT | Performed by: OPTOMETRIST

## 2023-04-28 PROCEDURE — 92014 COMPRE OPH EXAM EST PT 1/>: CPT | Performed by: OPTOMETRIST

## 2023-04-28 ASSESSMENT — REFRACTION_MANIFEST
OD_ADD: +1.75
OD_AXIS: 155
OD_SPHERE: PLANO
OD_CYLINDER: +0.50
OS_CYLINDER: +0.25
OS_ADD: +1.75
OS_SPHERE: PLANO
OS_AXIS: 180

## 2023-04-28 ASSESSMENT — EXTERNAL EXAM - RIGHT EYE: OD_EXAM: NORMAL

## 2023-04-28 ASSESSMENT — CUP TO DISC RATIO
OD_RATIO: 0.7
OS_RATIO: 0.6

## 2023-04-28 ASSESSMENT — CONF VISUAL FIELD
METHOD: COUNTING FINGERS
OD_INFERIOR_TEMPORAL_RESTRICTION: 0
OD_SUPERIOR_NASAL_RESTRICTION: 0
OS_NORMAL: 1
OD_NORMAL: 1
OD_INFERIOR_NASAL_RESTRICTION: 0
OS_INFERIOR_NASAL_RESTRICTION: 0
OS_SUPERIOR_NASAL_RESTRICTION: 0
OS_INFERIOR_TEMPORAL_RESTRICTION: 0
OS_SUPERIOR_TEMPORAL_RESTRICTION: 0
OD_SUPERIOR_TEMPORAL_RESTRICTION: 0

## 2023-04-28 ASSESSMENT — VISUAL ACUITY
OD_SC+: +2
OD_SC: 20/20
OS_SC: 20/15
METHOD: SNELLEN - LINEAR

## 2023-04-28 ASSESSMENT — EXTERNAL EXAM - LEFT EYE: OS_EXAM: NORMAL

## 2023-04-28 ASSESSMENT — TONOMETRY
IOP_METHOD: ICARE
OS_IOP_MMHG: 12
OD_IOP_MMHG: 13

## 2023-04-28 ASSESSMENT — SLIT LAMP EXAM - LIDS
COMMENTS: NORMAL
COMMENTS: NORMAL

## 2023-04-28 NOTE — NURSING NOTE
Chief Complaints and History of Present Illnesses   Patient presents with     COMPREHENSIVE EYE EXAM     Chief Complaint(s) and History of Present Illness(es)     COMPREHENSIVE EYE EXAM            Laterality: both eyes    Course: stable    Associated symptoms: Negative for eye pain, flashes and floaters    Treatments tried: no treatments          Comments    Micheal Husain is a 47 year old male who presents for a comprehensive exam. Last eye exam was over 2 years ago. Since exam, vision has gradually worsened for near - distance is doing fine. Denies using lubricating drops. No flashes or floaters. No eye pain.     Amari Vanegas COT 12:52 PM April 28, 2023

## 2023-04-28 NOTE — PROGRESS NOTES
Assessment/Plan  (H52.4) Presbyopia  (primary encounter diagnosis)  Comment: Excellent uncorrected distance vision, uses glasses for near work only.   Plan: REFRACTION [[id number]]        Discussed findings with patient. New spectacle prescription dispensed to patient. Patient is welcome to return to clinic with prolonged adaptation difficulties. Glasses for computer work only recommended - patient likes +1.50 add in clinic for his habitual computer distance. If this doesn't work, would return for recheck after requesting he measure his exact working distance.       Complete documentation of historical and exam elements from today's encounter can  be found in the full encounter summary report (not reduplicated in this progress  note). I personally obtained the chief complaint(s) and history of present illness. I  confirmed and edited as necessary the review of systems, past medical/surgical  history, family history, social history, and examination findings as documented by  others; and I examined the patient myself. I personally reviewed the relevant tests,  images, and reports as documented above. I formulated and edited as necessary the  assessment and plan and discussed the findings and management plan with the  patient and family.    Alejandro Trejo, OD

## 2023-05-02 ENCOUNTER — VIRTUAL VISIT (OUTPATIENT)
Dept: FAMILY MEDICINE | Facility: CLINIC | Age: 48
End: 2023-05-02
Payer: COMMERCIAL

## 2023-05-02 DIAGNOSIS — M79.18 GLUTEAL PAIN: Primary | ICD-10-CM

## 2023-05-02 DIAGNOSIS — K62.89 RECTAL PAIN: ICD-10-CM

## 2023-05-02 PROCEDURE — 99214 OFFICE O/P EST MOD 30 MIN: CPT | Mod: VID | Performed by: FAMILY MEDICINE

## 2023-05-02 NOTE — Clinical Note
Please call the patient to get him scheduled to see me (Dr. Parson) in the office within 1 week. It is ok to take a same-day slot.

## 2023-05-02 NOTE — PATIENT INSTRUCTIONS
At [facility], we strive to deliver an exceptional experience to you, every time we see you. If you receive a survey, please complete it as we do value your feedback.  If you have MyChart, you can expect to receive results automatically within 24 hours of their completion.  Your provider will send a note interpreting your results as well.   If you do not have MyChart, you should receive your results in about a week by mail.    Your care team:                            Family Medicine Internal Medicine   MD Dani Phelan MD Shantel Branch-Fleming, MD Srinivasa Vaka, MD Katya Belousova, JOHANNY Hernandez CNP, MD ([facility]) [facility]   Flavio Turpin, MD Alyx Fernandez MD Amelia Massimini APRN CNP   Lizeth Finnegan APRN MD Jamil Santoyo MD          Clinic hours: Monday - Thursday 7 am-6 pm; Fridays 7 am-5 pm.   Urgent care: Monday - Friday 10 am- 8 pm; Saturday and Sunday 9 am-5 pm.    Clinic: [phone]       [facility]: Monday - Thursday 8 am - 7 pm; Friday 8 am - 6 pm  [facility]: [phone]       Please call [facility]: [phone] to schedule your lumbar spine MRI.

## 2023-05-02 NOTE — PROGRESS NOTES
Micheal is a 47 year old who is being evaluated via a billable video visit.      How would you like to obtain your AVS? MyChart  If the video visit is dropped, the invitation should be resent by: Text to cell phone: [phone]  Will anyone else be joining your video visit? No          Assessment & Plan     (M79.18) Gluteal pain  (primary encounter diagnosis)  Comment: Left greater than right, similar to how he described a suspected lumbar radicular pain syndrome 2 years ago (2/23/21). No MRI was done then, and now he presents with similar symptoms that are positional, suggesting something like spinal stenosis.  Plan: MR Lumbar Spine w/o Contrast            (K62.89) Rectal pain  Comment: This is more difficult to sort out during a video visit. It could be an extension of the above problem, but I wonder if it is a separate problem such as hemorrhoids, anal fissure, rectal inflammation, etc.  Plan: I advised the patient that this needs a physical exam to sort out further. He is also encouraged to go ahead and schedule his colonoscopy which has a mild chance of helping out with this problem. I will see him in the office in a week.      31 minutes spent by me on the date of the encounter doing chart review, patient visit and documentation         Lamin Parson MD  [facility]   Micheal is a 47 year old, presenting for the following health issues:  Musculoskeletal Problem (Pain in gluteus anila)        5/2/2023    11:32 AM   Additional Questions   Roomed by Jessica     Musculoskeletal Problem    History of Present Illness       Reason for visit:  Pain in glutes  Symptom onset:  More than a month  Symptoms include:  Pain while sitting and standing up and feels like it's deep in the muscle  Symptom intensity:  Moderate  Symptom progression:  Staying the same  Had these symptoms before:  Yes  Has tried/received treatment for these symptoms:  Yes  What makes it worse:  Sitting  down    He eats 0-1 servings of fruits and vegetables daily.He consumes 0 sweetened beverage(s) daily.He exercises with enough effort to increase his heart rate 20 to 29 minutes per day.  He exercises with enough effort to increase his heart rate 5 days per week.   He is taking medications regularly.     The patient reports that this pain began when he started working from home about 2 years ago and began sitting for 8-10 hours per day. The patient describes that he has a burning sensation in the muscles of his buttocks (especially left-sided), as well as pain felt inside his rectum for the last 6 months.      Review of Systems         Objective           Vitals:  No vitals were obtained today due to virtual visit.    Physical Exam   GENERAL: Healthy, alert and no distress  EYES: Eyes grossly normal to inspection.  No discharge or erythema, or obvious scleral/conjunctival abnormalities.  RESP: No audible wheeze, cough, or visible cyanosis.  No visible retractions or increased work of breathing.    SKIN: Visible skin clear. No significant rash, abnormal pigmentation or lesions.  NEURO: Cranial nerves grossly intact.  Mentation and speech appropriate for age.  PSYCH: Mentation appears normal, affect normal/bright, judgement and insight intact, normal speech and appearance well-groomed.                Video-Visit Details    Type of service:  Video Visit     Total Video Time: 12 minutes    Originating Location (pt. Location): Home    Distant Location (provider location):  On-site     Platform used for Video Visit: [facility]    This document serves as a record of the services and decisions personally performed and made by Dr. Parson. It was created on his behalf by Martinez Perla, a trained medical scribe. The creation of this document is based the provider's statements to the medical scribe.  Martinez Perla,  6:14 PM

## 2023-05-03 ENCOUNTER — HOSPITAL ENCOUNTER (OUTPATIENT)
Facility: AMBULATORY SURGERY CENTER | Age: 48
End: 2023-05-03
Attending: INTERNAL MEDICINE
Payer: COMMERCIAL

## 2023-05-03 ENCOUNTER — TELEPHONE (OUTPATIENT)
Dept: GASTROENTEROLOGY | Facility: CLINIC | Age: 48
End: 2023-05-03

## 2023-05-03 NOTE — TELEPHONE ENCOUNTER
Screening Questions  BLUE  KIND OF PREP RED  LOCATION [review exclusion criteria] GREEN  SEDATION TYPE        Y Are you active on mychart?       JAYNE ORDONEZ Ordering/Referring Provider?        HP What type of coverage do you have?      N Have you had a positive covid test in the last 14 days?     24.03  1. BMI  [BMI 40+ - review exclusion criteria& smart-phrase document]    Y  2. Are you able to give consent for your medical care? [IF NO,RN REVIEW]          N  3. Are you taking any prescription pain medications on a routine schedule   (ex narcotics: oxycodone, roxicodone, oxycontin,  and percocet)? [RN Review]          3a. EXTENDED PREP What kind of prescription?     N 4. Do you have any chemical dependencies such as alcohol, street drugs, or methadone?        **If yes 3- 5 , please schedule with MAC sedation.**          IF YES TO ANY 6 - 10 - HOSPITAL SETTING ONLY.     N 6.   Do you need assistance transferring?     N 7.   Have you had a heart or lung transplant?    N 8.   Are you currently on dialysis?   N 9.   Do you use daily home oxygen?   N 10. Do you take nitroglycerin?   10a.  If yes, how often?     11. [FEMALES]  NA Are you currently pregnant?    11a.  If yes, how many weeks? [ Greater than 12 weeks, OR NEEDED]    N 12. Do you have Pulmonary Hypertension? *NEED PAC APPT AT [facility] w/ [facility]*     N 13. [review exclusion criteria]  Do you have any implantable devices in your body (pacemaker, defib, LVAD)?    N 14. In the past 6 months, have you had any heart related issues including cardiomyopathy or heart attack?     14a. N If yes, did it require cardiac stenting if so when?     N 15. Have you had a stroke or Transient ischemic attack (TIA - aka  mini stroke ) within 6 months?      N 16. Do you have mod to severe Obstructive Sleep Apnea?  [Hospital only]    N 17. Do you have SEVERE AND UNCONTROLLED asthma? *NEED PAC APPT AT [facility] w/MAC*     18. Are you currently taking any blood thinners?     18a. No.  "Continue to 19.   18b. Yes/no Blood Thinner: No [CONTINUE TO #19]    N 19. Do you take the medication Phentermine?    19a. If yes, \"Hold for 7 days before procedure.  Please consult your prescribing provider if you have questions about holding this medication.\"     N  20. Do you have chronic kidney disease?      N  21. Do you have a diagnosis of diabetes?     N  22. On a regular basis do you go 3-5 days between bowel movements?      23. Preferred LOCAL Pharmacy for Pre Prescription    [ LIST ONLY ONE PHARMACY]     [facility] [id number] [place] [facility] [id number] [place] [address] N        - CLOSING REMINDERS -    Informed patient they will need an adult    Cannot take any type of public or medical transportation alone    Conscious Sedation- Needs  for 6 hours after the procedure       MAC/General-Needs  for 24 hours after procedure    Pre-Procedure Covid test to be completed [[facility] PCR Testing Required]    Confirmed Nurse will call to complete assessment       - SCHEDULING DETAILS -  N Hospital Setting Required? If yes, what is the exclusion?:    [facility]  Surgeon    07/03/2023  Date of Procedure  Lower Endoscopy [Colonoscopy]  [facility]ALAX GATORADE WITHOUT MAGNEISUM CITRATE Which Colonoscopy Prep was Sent?     CS Sedation Type     N PAC / Pre-op Required               "

## 2023-05-08 ENCOUNTER — TELEPHONE (OUTPATIENT)
Dept: FAMILY MEDICINE | Facility: CLINIC | Age: 48
End: 2023-05-08
Payer: COMMERCIAL

## 2023-05-08 NOTE — TELEPHONE ENCOUNTER
Called and spoke to the patient and scheduled a follow up appointment for 5/18/2023.  Marla Camarillo MA  [facility]   Primary Care

## 2023-05-08 NOTE — TELEPHONE ENCOUNTER
Reason for Call:  Appointment Request    Patient requesting this type of appt:  Hospital/ED Follow-Up     Requested provider: Lamin Pasron MD    Reason patient unable to be scheduled: Not within requested timeframe    When does patient want to be seen/preferred time: 1-2 weeks    Comments: Patient scheduled for MRI on 5/13/23 and would like to follow up with Dr Parson afterward so he has the results to review. Please call patient.    Could we send this information to you in [place] or would you prefer to receive a phone call?:   Patient would prefer a phone call   Okay to leave a detailed message?: Yes at Home number on file [phone] (home)    Call taken on 5/8/2023 at 10:22 AM by Betzy Quigley

## 2023-05-13 ENCOUNTER — HOSPITAL ENCOUNTER (OUTPATIENT)
Dept: MRI IMAGING | Facility: CLINIC | Age: 48
Discharge: HOME | End: 2023-05-13
Attending: FAMILY MEDICINE | Admitting: FAMILY MEDICINE
Payer: COMMERCIAL

## 2023-05-13 DIAGNOSIS — M79.18 GLUTEAL PAIN: ICD-10-CM

## 2023-05-13 PROCEDURE — 72148 MRI LUMBAR SPINE W/O DYE: CPT

## 2023-05-18 ENCOUNTER — OFFICE VISIT (OUTPATIENT)
Dept: FAMILY MEDICINE | Facility: CLINIC | Age: 48
End: 2023-05-18
Payer: COMMERCIAL

## 2023-05-18 VITALS
TEMPERATURE: 97.9 F | HEART RATE: 59 BPM | BODY MASS INDEX: 24.08 KG/M2 | HEIGHT: 67 IN | SYSTOLIC BLOOD PRESSURE: 119 MMHG | OXYGEN SATURATION: 98 % | RESPIRATION RATE: 16 BRPM | DIASTOLIC BLOOD PRESSURE: 73 MMHG | WEIGHT: 153.4 LBS

## 2023-05-18 DIAGNOSIS — M79.18 GLUTEAL PAIN: Primary | ICD-10-CM

## 2023-05-18 DIAGNOSIS — Z28.21 VACCINATION NOT CARRIED OUT BECAUSE OF PATIENT REFUSAL: ICD-10-CM

## 2023-05-18 DIAGNOSIS — M54.32 BILATERAL SCIATICA: ICD-10-CM

## 2023-05-18 DIAGNOSIS — M54.31 BILATERAL SCIATICA: ICD-10-CM

## 2023-05-18 PROCEDURE — 99214 OFFICE O/P EST MOD 30 MIN: CPT | Performed by: FAMILY MEDICINE

## 2023-05-18 RX ORDER — GABAPENTIN 300 MG/1
CAPSULE ORAL
Qty: 90 CAPSULE | Refills: 1 | Status: SHIPPED | OUTPATIENT
Start: 2023-05-18 | End: 2024-01-30

## 2023-05-18 ASSESSMENT — PAIN SCALES - GENERAL: PAINLEVEL: MODERATE PAIN (5)

## 2023-05-18 NOTE — PATIENT INSTRUCTIONS
At [facility], we strive to deliver an exceptional experience to you, every time we see you. If you receive a survey, please complete it as we do value your feedback.  If you have MyChart, you can expect to receive results automatically within 24 hours of their completion.  Your provider will send a note interpreting your results as well.   If you do not have MyChart, you should receive your results in about a week by mail.    Your care team:                            Family Medicine Internal Medicine   MD Dani Phelan MD Shantel Branch-Fleming, MD Srinivasa Vaka, MD Katya Belousova, PAHEATHER ReedHillJOHANNY Ruiz CNP, MD Pediatrics   Flavio Turpin, MD Alyx Fernandez MD Amelia Massimini APRN CNP   Lizeth Finnegan, APRN MD Jamil Santoyo MD          Clinic hours: Monday - Thursday 7 am-6 pm; Fridays 7 am-5 pm.   Urgent care: Monday - Friday 10 am- 8 pm; Saturday and Sunday 9 am-5 pm.    Clinic: [phone]       [facility]: Monday - Thursday 8 am - 7 pm; Friday 8 am - 6 pm  [facility]: [phone]        [email or web address]      Call to schedule an appointment for an immunization update (Hepatitis-B #2)

## 2023-05-18 NOTE — PROGRESS NOTES
Assessment & Plan     (M79.18) Gluteal pain  (primary encounter diagnosis)  Comment: Ischial tuberosity pain? MRI is normal, so I don't think this is a problem with lumbar DJD or radiculopathy. It is possible that he has sciatica. Improved since last visit, so treatment seems optional right now.  Plan: gabapentin (NEURONTIN) 300 MG capsule, Physical        Therapy Referral        Handout(s) provided.     (M54.31,  M54.32) Bilateral sciatica  Comment: left greater than right.   Plan: gabapentin (NEURONTIN) 300 MG capsule, Physical        Therapy Referral        I recommend trying the medication for at least 3 weeks to see if we can eliminate his mild symptoms completely.    (Z28.21) Vaccination not carried out because of patient refusal  Comment: The patient would like to defer this vaccine today since he is travelling tonight  Plan: Hep-B #2 in the near future           Lamin Parson MD  [facility]    Agnieszka Burton is a 47 year old, presenting for the following health issues:  MRI Transcription (Review\Results)        5/18/2023     2:51 PM   Additional Questions   Roomed by Kisha   Accompanied by Self     History of Present Illness       Reason for visit:  Back pain    He eats 2-3 servings of fruits and vegetables daily.He consumes 0 sweetened beverage(s) daily.He exercises with enough effort to increase his heart rate 20 to 29 minutes per day.  He exercises with enough effort to increase his heart rate 4 days per week.   He is taking medications regularly.       Concern - Following up on MRI results for back pain   Onset: back pain  Description: lower back pain  Intensity: mild  Progression of Symptoms:  same  Accompanying Signs & Symptoms: back pain   Previous history of similar problem: yes  Precipitating factors:        Worsened by: sitting for long period of time  Alleviating factors:        Improved by: walking/standing  Therapies tried and outcome: None      The  "patient reports recently that he hasn't felt much pain. The most pain is experienced after the patient is sitting for an extended period of time, so he has been trying to do his work standing up. He denies coccydynia pain. He describes burning sensations in the gluteal muscles, as well as pain down the posterior sides of his legs (left greater than right) stopping above the knee.     Review of Systems         Objective    /73 (BP Location: Left arm, Patient Position: Sitting, Cuff Size: Adult Regular)   Pulse 59   Temp 97.9  F (36.6  C) (Oral)   Resp 16   Ht 1.702 m (5' 7.01\")   Wt 69.6 kg (153 lb 6.4 oz)   SpO2 98%   BMI 24.02 kg/m    Body mass index is 24.02 kg/m .  Physical Exam   GENERAL: healthy, alert and no distress  EYES: Eyes grossly normal to inspection, PERRL, EOMI, sclerae white and conjunctivae normal  MS: no gross musculoskeletal defects noted, no edema. No focal tenderness of the pelvic bones, including the ASIS, the ischial tuberosities, and the coccyx  SKIN: no suspicious lesions or rashes to visible skin  NEURO: Normal strength and tone, sensory exam grossly normal, mentation intact, oriented times 3 and cranial nerves 2-12 intact  PSYCH: mentation appears normal, affect normal/bright     EXAM: MR LUMBAR SPINE W/O CONTRAST  LOCATION: [facility]  DATE/TIME: 5/13/2023 5:37 PM CDT     INDICATION: recurrence (initially 2 years ago) of gluteal pain left greater than right, suspecting spinal stenosis or radiculopathy  COMPARISON:  Lumbar radiographs 02/23/2021.  TECHNIQUE: Routine Lumbar Spine MRI without IV contrast.     FINDINGS:   Transitional anatomy with lumbarization of S1. L5-S1 is defined on image 43 of series 9. Alignment is normal. Vertebral body heights are normal.  Normal marrow signal. Normal distal spinal cord and cauda equina with conus medullaris at L1-L2.      Prevertebral and dorsal paraspinal soft tissues are unremarkable. Visualized " intra-abdominal structures are unremarkable.     T12-L1: Unremarkable appearance of the disc. No significant facet arthropathy. No significant canal or foraminal stenosis.      L1-L2: Unremarkable appearance of the disc. No significant facet arthropathy. No significant canal or foraminal stenosis.     L2-L3: Unremarkable appearance of the disc. No significant facet arthropathy. No significant canal or foraminal stenosis.      L3-L4: Unremarkable appearance of the disc. No significant facet arthropathy. No significant canal or foraminal stenosis.     L4-L5: Mild bulge. No significant disc height loss. Mild bilateral facet arthropathy. No significant canal stenosis. Mild bilateral foraminal stenosis.     L5-S1: Mild bulge. Mild disc height loss. Mild bilateral facet arthropathy. No significant canal stenosis. Mild bilateral foraminal stenosis.                                                                      IMPRESSION:  1.  Early lower lumbar degenerative changes without significant canal or foraminal stenosis.              This document serves as a record of the services and decisions personally performed and made by Dr. Parson. It was created on his behalf by Martinez Perla, a trained medical scribe. The creation of this document is based the provider's statements to the medical scribe.  Martinez Perla,  4:11 PM

## 2023-05-23 ENCOUNTER — TELEPHONE (OUTPATIENT)
Dept: FAMILY MEDICINE | Facility: CLINIC | Age: 48
End: 2023-05-23
Payer: COMMERCIAL

## 2023-05-23 NOTE — TELEPHONE ENCOUNTER
Patient Quality Outreach    Patient is due for the following:   Colon Cancer Screening      Topic Date Due     Hepatitis B Vaccine (2 of 3 - 19+ 3-dose series) 01/27/2023       Next Steps:   Patient has upcoming appointment, these items will be addressed at that time. Colonoscopy scheduled for 7/3/23.    Type of outreach:    Chart review performed, no outreach needed.      Questions for provider review:    None           Jose Cruz Beavers MA

## 2023-06-20 ENCOUNTER — TELEPHONE (OUTPATIENT)
Dept: GASTROENTEROLOGY | Facility: CLINIC | Age: 48
End: 2023-06-20

## 2023-06-20 NOTE — TELEPHONE ENCOUNTER
Attempted to contact patient regarding upcoming Colonoscopy  procedure on 07.03.2023 for pre assessment questions. No answer.     Left message to return call to [phone] #4    Discuss Covid policy and designated  policy.    Pre op exam? N/A    Arrival time: 1120. Procedure time: 1205    Facility location: [facility]; [address], [place] [address]    Sedation type: Conscious sedation     NSAIDs? No NSAID medications per patient's medication list.  RN will verify with pre-assessment call.    Anticoagulants: No    Electronic implanted devices? No    Diabetic? Pre-diabetic-takes metformin    HOLD (if applicable)  Oral diabetic medications: Yes - Metformin (glucophage). Patient to hold oral diabetic medications day of procedure.  Diabetic injectables: No  Insulin: No    Indication for procedure: screening colonoscopy    Bowel prep recommendation: Miralax prep without magnesium citrate     Prep instructions sent via [employer].     Margo Herring RN  Endoscopy Procedure Pre Assessment RN

## 2023-06-23 NOTE — TELEPHONE ENCOUNTER
Second attempt for pre-assessment prior to upcoming colonoscopy     No answer.  Left message to return call [phone] #4    Pre op exam? N/A     Sent [employer] message.    Margo Herring RN  Endoscopy Procedure Pre Assessment RN

## 2023-06-26 ENCOUNTER — TELEPHONE (OUTPATIENT)
Dept: GASTROENTEROLOGY | Facility: CLINIC | Age: 48
End: 2023-06-26
Payer: COMMERCIAL

## 2023-06-26 NOTE — TELEPHONE ENCOUNTER
Third attempt for pre-assessment prior to upcoming colonoscopy     No answer.  Left message to return call [phone] #4    Pre op exam? N/A    Margo Herring RN  Endoscopy Procedure Pre Assessment RN

## 2023-06-26 NOTE — TELEPHONE ENCOUNTER
Caller: Micheal Husain  Reason for Reschedule/Cancellation (please be detailed, any staff messages or encounters to note?): feeling ill, just does not want to do next week      Prior to reschedule please review:    Ordering Provider: ava    Sedation per order: moderate    Does patient have any ASC Exclusions, please identify?: n      Notes on Cancelled Procedure:    Procedure: Lower Endoscopy [Colonoscopy]     Date: 7/3    Location: [facility]; [address], [place] [address]    Surgeon: Miguel Angel      Rescheduled: Yes    Procedure: Lower Endoscopy [Colonoscopy]     Date: 9/6    Location: [facility]; [address], [place] [address]    Surgeon: Miguel Angel    Sedation Level Scheduled  moderate,  Reason for Sedation Level on block    Prep/Instructions updated and sent: y     Send In - basket message to Panc - Maxim Pool if EUS  procedure is canceled or rescheduled: [ N/A, YES or NO] n/a

## 2023-06-26 NOTE — TELEPHONE ENCOUNTER
Procedure rescheduled to 9/6/23    Margo Pack, RN  Endoscopy Procedure Pre Assessment RN  [phone] option 4

## 2023-08-21 NOTE — TELEPHONE ENCOUNTER
Attempted to contact patient in order to complete pre assessment questions.     No answer. Left message to return call to [phone] option 4      Procedure details:    Patient scheduled for Colonoscopy  on 09.06.2023.     Arrival time: 1015. Procedure time 1100    Pre op exam needed? N/A    Facility location: [facility] [place]; [address], [place] [address]    Sedation type: Conscious sedation     Indication for procedure: Screen for colon cancer       Chart review:     Electronic implanted devices? No    Diabetic? Prediabetic    Diabetic medication HOLDING recommendations: (if applicable)  Oral diabetic medications: Yes:  Metformin (glucophage): HOLD day of procedure.  Diabetic injectables: N/A  Insulin: N/A      Medication review:    Anticoagulants? No    NSAIDS? No NSAID medications per patient's medication list.  RN will verify with pre-assessment call.    Other medication HOLDING recommendations:  N/A      Prep for procedure:     Bowel prep recommendation: Miralax without magnesium citrate  Due to: standard bowel prep.    Prep instructions sent via [employer].     Margo Herring RN  Endoscopy Procedure Pre Assessment RN

## 2023-08-28 ENCOUNTER — TELEPHONE (OUTPATIENT)
Dept: GASTROENTEROLOGY | Facility: CLINIC | Age: 48
End: 2023-08-28
Payer: COMMERCIAL

## 2023-08-28 NOTE — TELEPHONE ENCOUNTER
Caller: Micheal  Reason for Reschedule/Cancellation (please be detailed, any staff messages or encounters to note?): no longer       Prior to reschedule please review:  Ordering Provider: Blank  Sedation per order: CS  Does patient have any ASC Exclusions, please identify?: no      Notes on Cancelled Procedure:  Procedure: Lower Endoscopy [Colonoscopy]   Date: 9/6  Location: [facility]; [address], [place] [address]  Surgeon: Swati      Rescheduled: No

## 2023-08-28 NOTE — TELEPHONE ENCOUNTER
Second call attempt to complete pre assessment.     No answer.  Left message to return call to [phone] #4 within 24 hours or risk procedure being cancelled.     Additional information needed?  N/A      Mariely Hughes RN  Endoscopy Procedure Pre Assessment RN

## 2023-09-05 NOTE — TELEPHONE ENCOUNTER
DIAGNOSIS: Rt shoulder    APPOINTMENT DATE: 09/21/2023   NOTES STATUS DETAILS   OFFICE NOTE from referring provider N/A    OFFICE NOTE from other specialist N/A    DISCHARGE SUMMARY from hospital N/A    DISCHARGE REPORT from the ER N/A    OPERATIVE REPORT N/A    EMG report N/A    MEDICATION LIST N/A    MRI N/A    DEXA (osteoporosis/bone health) N/A    CT SCAN N/A    XRAYS (IMAGES & REPORTS) N/A

## 2023-09-21 ENCOUNTER — PRE VISIT (OUTPATIENT)
Dept: ORTHOPEDICS | Facility: CLINIC | Age: 48
End: 2023-09-21

## 2023-09-21 ENCOUNTER — ANCILLARY PROCEDURE (OUTPATIENT)
Dept: GENERAL RADIOLOGY | Facility: CLINIC | Age: 48
End: 2023-09-21
Attending: FAMILY MEDICINE
Payer: COMMERCIAL

## 2023-09-21 ENCOUNTER — OFFICE VISIT (OUTPATIENT)
Dept: ORTHOPEDICS | Facility: CLINIC | Age: 48
End: 2023-09-21
Payer: COMMERCIAL

## 2023-09-21 DIAGNOSIS — M25.511 RIGHT SHOULDER PAIN: ICD-10-CM

## 2023-09-21 DIAGNOSIS — G89.29 CHRONIC RIGHT SHOULDER PAIN: Primary | ICD-10-CM

## 2023-09-21 DIAGNOSIS — M25.511 CHRONIC RIGHT SHOULDER PAIN: Primary | ICD-10-CM

## 2023-09-21 DIAGNOSIS — M76.899 HAMSTRING TENDONITIS AT ORIGIN: ICD-10-CM

## 2023-09-21 PROCEDURE — 99204 OFFICE O/P NEW MOD 45 MIN: CPT | Performed by: FAMILY MEDICINE

## 2023-09-21 PROCEDURE — 73030 X-RAY EXAM OF SHOULDER: CPT | Mod: RT | Performed by: RADIOLOGY

## 2023-09-21 RX ORDER — MELOXICAM 15 MG/1
15 TABLET ORAL DAILY
Qty: 10 TABLET | Refills: 0 | Status: SHIPPED | OUTPATIENT
Start: 2023-09-21 | End: 2024-01-30

## 2023-09-21 ASSESSMENT — PAIN SCALES - GENERAL: PAINLEVEL: MODERATE PAIN (4)

## 2023-09-21 NOTE — LETTER
9/21/2023         RE: Micheal Husain  [address]  [facility] [address]        Dear Colleague,    Thank you for referring your patient, Micheal Husain, to the [facility] [place]. Please see a copy of my visit note below.    CHIEF COMPLAINT:  Pain and New Patient of the Right Shoulder       HISTORY OF PRESENT ILLNESS  Mr. Husain is a pleasant 48 year old male who presents to clinic today with right shoulder pain.  Micheal has had pain in his right shoulder for the past few weeks.  He was lifting a heavy object with his arms supinated, holding the object out at length a few weeks ago when this originally happened.  He felt no acute injury at the time, although immediately after dropping the object he felt a delayed onset of pain that worsened.  This stopped him in his tracks for about 20 minutes or so.  Since then he has had pain in his shoulder region that has been unrelenting.  This has slightly improved, although is still present.  He points to the posterior aspect of the shoulder, specifically at the periscapular region.  Of note, he did have an injury to his shoulder about 20 years ago while playing cricket.  This had resolved with conservative care.        Additional history: as documented    MEDICAL HISTORY  Patient Active Problem List   Diagnosis     Family history of ischemic heart disease     Hyperlipidemia LDL goal <70     Prediabetes     Essential hypertension with goal blood pressure less than 140/90     Coronary artery disease involving native coronary artery of native heart without angina pectoris     Hypertensive kidney disease, stage 1     Status post coronary artery stent placement     Insomnia, unspecified type     Gastroesophageal reflux disease without esophagitis       Current Outpatient Medications   Medication Sig Dispense Refill     amLODIPine (NORVASC) 10 MG tablet Take 1 tablet (10 mg) by mouth daily for blood pressure 90 tablet 3     aspirin  (ASPIRIN LOW DOSE) 81 MG EC tablet Take 1 tablet (81 mg) by mouth daily 90 tablet 3     atorvastatin (LIPITOR) 80 MG tablet Take 1 tablet (80 mg) by mouth daily 90 tablet 1     doxepin (SINEQUAN) 10 MG capsule Take 1 capsule (10 mg) by mouth At Bedtime 90 capsule 3     gabapentin (NEURONTIN) 300 MG capsule 1 cap every night for 1-3 days, then 1 cap twice daily for 1-3 days, then 1 cap three times daily for nerve pain (sciatica). 90 capsule 1     metFORMIN (GLUCOPHAGE XR) 500 MG 24 hr tablet Take 1 tablet (500 mg) by mouth daily (with dinner) for diabetes prevention. 90 tablet 3       Allergies   Allergen Reactions     Hydrochlorothiazide Other (See Comments) and Fatigue     hyponatremia     Metoprolol Other (See Comments)     ED       Family History   Problem Relation Age of Onset     Kidney failure Mother              Cataracts Mother      Cerebrovascular Disease Father      Heart Disease Father              Cataracts Father      Breast Cancer Sister      Coronary Stenting Brother 47     Coronary Stenting Brother 47     Diabetes No family hx of      Colon Cancer No family hx of      Prostate Cancer No family hx of        Additional medical/Social/Surgical histories reviewed in EPIC and updated as appropriate.        PHYSICAL EXAM  General  - normal appearance, in no obvious distress  Musculoskeletal - right shoulder  - inspection: normal bone and joint alignment, no obvious deformity, no scapular winging, no AC step-off  - palpation: mildly tender anterior shoulder  - ROM: Mildly painful endrange flexion  - strength: 5/5  strength, 5/5 in all shoulder planes  - special tests:  (-) Neer  (Weakly +) Hawkin's  (Weakly +) Lizzette's  Neuro  - no sensory or motor deficit, grossly normal coordination, normal muscle tone               ASSESSMENT & PLAN  Mr. Husain is a 48 year old male who presents to clinic today with right shoulder pain.    I ordered and independently reviewed an xray of his right shoulder, this  reveals no obvious acute or chronic issues.    His symptoms are consistent with shoulder impingement.  We discussed pathophysiology and some treatment strategies including offloading, physical therapy based activities, and the role of anti-inflammatories.    I am referring him to physical therapy.  I am also prescribing him 10 days of diclofenac, he should take this daily on a scheduled basis.    If his symptoms are not improving whatsoever over the coming 4 to 6 weeks I would likely recommend advanced imaging.    It was a pleasure seeing Micheal today.    Eliud Corral DO, [facility]  Primary Care Sports Medicine      This note was constructed using Dragon dictation software, please excuse any minor errors in spelling, grammar, or syntax.      Again, thank you for allowing me to participate in the care of your patient.        Sincerely,        Eliud Corral DO

## 2023-09-21 NOTE — NURSING NOTE
Chief Complaint   Patient presents with    Right Shoulder - Pain, New Patient       There were no vitals filed for this visit.    There is no height or weight on file to calculate BMI.      DALE Lema NREMT

## 2023-09-21 NOTE — PROGRESS NOTES
CHIEF COMPLAINT:  Pain and New Patient of the Right Shoulder       HISTORY OF PRESENT ILLNESS  Mr. Husain is a pleasant 48 year old male who presents to clinic today with right shoulder pain.  Micheal has had pain in his right shoulder for the past few weeks.  He was lifting a heavy object with his arms supinated, holding the object out at length a few weeks ago when this originally happened.  He felt no acute injury at the time, although immediately after dropping the object he felt a delayed onset of pain that worsened.  This stopped him in his tracks for about 20 minutes or so.  Since then he has had pain in his shoulder region that has been unrelenting.  This has slightly improved, although is still present.  He points to the posterior aspect of the shoulder, specifically at the periscapular region.  Of note, he did have an injury to his shoulder about 20 years ago while playing cricket.  This had resolved with conservative care.        Additional history: as documented    MEDICAL HISTORY  Patient Active Problem List   Diagnosis    Family history of ischemic heart disease    Hyperlipidemia LDL goal <70    Prediabetes    Essential hypertension with goal blood pressure less than 140/90    Coronary artery disease involving native coronary artery of native heart without angina pectoris    Hypertensive kidney disease, stage 1    Status post coronary artery stent placement    Insomnia, unspecified type    Gastroesophageal reflux disease without esophagitis       Current Outpatient Medications   Medication Sig Dispense Refill    amLODIPine (NORVASC) 10 MG tablet Take 1 tablet (10 mg) by mouth daily for blood pressure 90 tablet 3    aspirin (ASPIRIN LOW DOSE) 81 MG EC tablet Take 1 tablet (81 mg) by mouth daily 90 tablet 3    atorvastatin (LIPITOR) 80 MG tablet Take 1 tablet (80 mg) by mouth daily 90 tablet 1    doxepin (SINEQUAN) 10 MG capsule Take 1 capsule (10 mg) by mouth At Bedtime 90 capsule 3    gabapentin  (NEURONTIN) 300 MG capsule 1 cap every night for 1-3 days, then 1 cap twice daily for 1-3 days, then 1 cap three times daily for nerve pain (sciatica). 90 capsule 1    metFORMIN (GLUCOPHAGE XR) 500 MG 24 hr tablet Take 1 tablet (500 mg) by mouth daily (with dinner) for diabetes prevention. 90 tablet 3       Allergies   Allergen Reactions    Hydrochlorothiazide Other (See Comments) and Fatigue     hyponatremia    Metoprolol Other (See Comments)     ED       Family History   Problem Relation Age of Onset    Kidney failure Mother             Cataracts Mother     Cerebrovascular Disease Father     Heart Disease Father             Cataracts Father     Breast Cancer Sister     Coronary Stenting Brother 47    Coronary Stenting Brother 47    Diabetes No family hx of     Colon Cancer No family hx of     Prostate Cancer No family hx of        Additional medical/Social/Surgical histories reviewed in EPIC and updated as appropriate.        PHYSICAL EXAM  General  - normal appearance, in no obvious distress  Musculoskeletal - right shoulder  - inspection: normal bone and joint alignment, no obvious deformity, no scapular winging, no AC step-off  - palpation: mildly tender anterior shoulder  - ROM: Mildly painful endrange flexion  - strength: 5/5  strength, 5/5 in all shoulder planes  - special tests:  (-) Neer  (Weakly +) Hawkin's  (Weakly +) Lizzette's  Neuro  - no sensory or motor deficit, grossly normal coordination, normal muscle tone               ASSESSMENT & PLAN  Mr. Husain is a 48 year old male who presents to clinic today with right shoulder pain.    I ordered and independently reviewed an xray of his right shoulder, this reveals no obvious acute or chronic issues.    His symptoms are consistent with shoulder impingement.  We discussed pathophysiology and some treatment strategies including offloading, physical therapy based activities, and the role of anti-inflammatories.    I am referring him to physical  therapy.  I am also prescribing him 10 days of diclofenac, he should take this daily on a scheduled basis.    If his symptoms are not improving whatsoever over the coming 4 to 6 weeks I would likely recommend advanced imaging.    It was a pleasure seeing lilia today.    Eliud Corral DO, [facility]  Primary Care Sports Medicine      This note was constructed using Dragon dictation software, please excuse any minor errors in spelling, grammar, or syntax.

## 2023-10-03 ENCOUNTER — THERAPY VISIT (OUTPATIENT)
Dept: PHYSICAL THERAPY | Facility: CLINIC | Age: 48
End: 2023-10-03
Payer: COMMERCIAL

## 2023-10-03 DIAGNOSIS — M25.511 ACUTE PAIN OF RIGHT SHOULDER: Primary | ICD-10-CM

## 2023-10-03 DIAGNOSIS — G89.29 CHRONIC RIGHT SHOULDER PAIN: ICD-10-CM

## 2023-10-03 DIAGNOSIS — M76.899 HAMSTRING TENDONITIS AT ORIGIN: ICD-10-CM

## 2023-10-03 DIAGNOSIS — M25.511 CHRONIC RIGHT SHOULDER PAIN: ICD-10-CM

## 2023-10-03 PROCEDURE — 97140 MANUAL THERAPY 1/> REGIONS: CPT | Mod: GP | Performed by: PHYSICAL THERAPIST

## 2023-10-03 PROCEDURE — 97161 PT EVAL LOW COMPLEX 20 MIN: CPT | Mod: GP | Performed by: PHYSICAL THERAPIST

## 2023-10-03 PROCEDURE — 97110 THERAPEUTIC EXERCISES: CPT | Mod: GP | Performed by: PHYSICAL THERAPIST

## 2023-10-03 NOTE — PROGRESS NOTES
PHYSICAL THERAPY EVALUATION  Type of Visit: Evaluation    See electronic medical record for Abuse and Falls Screening details.    Subjective       Presenting condition or subjective complaint: right shoulder pain, about 3 weeks ago was lifting a chair from upstairs to down stairs and felt a lot of strain. By the time he got the chair down the stairs he was in severe pain in his right UT area.  Date of onset: 09/01/23    Relevant medical history: Heart problems; High blood pressure   Dates & types of surgery:      Prior diagnostic imaging/testing results: X-ray Normal joint spaces and alignment. No fracture. Borderline mild widening of the AC joint.   Prior therapy history for the same diagnosis, illness or injury: No      Prior Level of Function  Transfers:   Ambulation:   ADL:   IADL:     Living Environment  Social support: With family members   Type of home: House   Stairs to enter the home:         Ramp:     Stairs inside the home:         Help at home:    Equipment owned:       Employment: Yes computer / IT  Hobbies/Interests: watching move, listening to music / traveling.    Patient goals for therapy: be able to lift things without pain    Pain assessment: Pain present     Objective   SHOULDER EVALUATION  PAIN: Pain Level at Rest: 3/10  Pain Level with Use: 6/10  Pain Location: Upper trapezius / shoulder blade  Pain Quality: Aching  Pain Frequency: intermittent  Pain is Exacerbated By: lying on right side and lifting  Pain is Relieved By: NSAIDs  Pain Progression: Improved  INTEGUMENTARY (edema, incisions):   POSTURE:   GAIT:   Weightbearing Status:   Assistive Device(s):   Gait Deviations:   BALANCE/PROPRIOCEPTION:   WEIGHTBEARING ALIGNMENT:   ROM:   (Degrees) Left AROM Left PROM Right AROM  Right PROM   Shoulder Flexion 156  156 with UT and lat pain    Shoulder Extension 78  78 with scapular pain    Shoulder Abduction 180  180    Shoulder Adduction       Shoulder Internal Rotation T5  T5 with significant pain     Shoulder External Rotation 80  70    Shoulder Horizontal Abduction       Shoulder Horizontal Adduction       Shoulder Flexion ER       Shoulder Flexion IR       Elbow Extension       Elbow Flexion       Pain:   End feel:     STRENGTH:   Pain: - none + mild ++ moderate +++ severe  Strength Scale: 0-5/5 Left Right   Shoulder Flexion 5 5   Shoulder Extension 5 5, + (mild)   Shoulder Abduction 5 5, + (mild)   Shoulder Adduction 5 5, + (mild)   Shoulder Internal Rotation 5 5, + (mild)   Shoulder External Rotation 5 5, + (mild)   Shoulder Horizontal Abduction 5 4   Shoulder Horizontal Adduction     Elbow Flexion     Elbow Extension     Mid Trap 5 5, ++ (mod)   Lower Trap     Rhomboid     Serratus Anterior       FLEXIBILITY:   SPECIAL TESTS:   PALPATION:  + pain over right thoracic paraspinals and + pain over medial border of scapula on right.   JOINT MOBILITY: WNL  CERVICAL SCREEN:  no pain with repeated cervical movements.     Assessment & Plan   CLINICAL IMPRESSIONS  Medical Diagnosis: Chronic right shoulder pain  Hamstring tendonitis at origin    Treatment Diagnosis: acute right shoulder pain / rhomboid strain vs radicular   Impression/Assessment: Patient is a 48 year old male with acute right shoulder pain complaints.  The following significant findings have been identified: Pain, Decreased strength, and Decreased activity tolerance. These impairments interfere with their ability to perform self care tasks, work tasks, and recreational activities as compared to previous level of function.     Clinical Decision Making (Complexity):  Clinical Presentation: Stable/Uncomplicated  Clinical Presentation Rationale: based on medical and personal factors listed in PT evaluation  Clinical Decision Making (Complexity): Low complexity    PLAN OF CARE  Treatment Interventions:  Modalities: Ultrasound  Interventions: Manual Therapy, Neuromuscular Re-education, Therapeutic Activity, Therapeutic Exercise, Self-Care/Home  Management    Long Term Goals     PT Goal 1  Goal Identifier: lifting  Goal Description: lifting 5lbs overalhead painfree  Rationale: to maximize safety and independence with performance of ADLs and functional tasks  Goal Progress: not currently lifting.  Target Date: 11/28/23      Frequency of Treatment: 1 x/ week  Duration of Treatment: 8 weeks    Recommended Referrals to Other Professionals:   Education Assessment:   Learner/Method: Patient;Pictures/Video;Demonstration;No Barriers to Learning    Risks and benefits of evaluation/treatment have been explained.   Patient/Family/caregiver agrees with Plan of Care.     Evaluation Time:     PT Eval, Low Complexity Minutes (80920): 16   Present: Not applicable     Signing Clinician: Karson Borthers PT

## 2023-10-07 ENCOUNTER — HEALTH MAINTENANCE LETTER (OUTPATIENT)
Age: 48
End: 2023-10-07

## 2023-10-10 ENCOUNTER — THERAPY VISIT (OUTPATIENT)
Dept: PHYSICAL THERAPY | Facility: CLINIC | Age: 48
End: 2023-10-10
Payer: COMMERCIAL

## 2023-10-10 DIAGNOSIS — M25.511 ACUTE PAIN OF RIGHT SHOULDER: Primary | ICD-10-CM

## 2023-10-10 PROCEDURE — 97140 MANUAL THERAPY 1/> REGIONS: CPT | Mod: GP | Performed by: PHYSICAL THERAPY ASSISTANT

## 2023-10-10 PROCEDURE — 97110 THERAPEUTIC EXERCISES: CPT | Mod: GP | Performed by: PHYSICAL THERAPY ASSISTANT

## 2023-10-18 ENCOUNTER — THERAPY VISIT (OUTPATIENT)
Dept: PHYSICAL THERAPY | Facility: CLINIC | Age: 48
End: 2023-10-18
Payer: COMMERCIAL

## 2023-10-18 DIAGNOSIS — M25.511 ACUTE PAIN OF RIGHT SHOULDER: Primary | ICD-10-CM

## 2023-10-18 PROCEDURE — 97110 THERAPEUTIC EXERCISES: CPT | Mod: GP | Performed by: PHYSICAL THERAPIST

## 2023-10-18 PROCEDURE — 97140 MANUAL THERAPY 1/> REGIONS: CPT | Mod: GP | Performed by: PHYSICAL THERAPIST

## 2023-10-24 ENCOUNTER — THERAPY VISIT (OUTPATIENT)
Dept: PHYSICAL THERAPY | Facility: CLINIC | Age: 48
End: 2023-10-24
Payer: COMMERCIAL

## 2023-10-24 DIAGNOSIS — M25.511 ACUTE PAIN OF RIGHT SHOULDER: Primary | ICD-10-CM

## 2023-10-24 PROCEDURE — 97110 THERAPEUTIC EXERCISES: CPT | Mod: GP | Performed by: PHYSICAL THERAPIST

## 2023-10-24 PROCEDURE — 97140 MANUAL THERAPY 1/> REGIONS: CPT | Mod: GP | Performed by: PHYSICAL THERAPIST

## 2023-10-31 DIAGNOSIS — E78.5 HYPERLIPIDEMIA LDL GOAL <70: ICD-10-CM

## 2023-10-31 DIAGNOSIS — I25.10 CORONARY ARTERY DISEASE INVOLVING NATIVE CORONARY ARTERY OF NATIVE HEART WITHOUT ANGINA PECTORIS: ICD-10-CM

## 2023-10-31 RX ORDER — ATORVASTATIN CALCIUM 80 MG/1
80 TABLET, FILM COATED ORAL DAILY
Qty: 90 TABLET | Refills: 0 | Status: SHIPPED | OUTPATIENT
Start: 2023-10-31 | End: 2024-01-30

## 2023-11-02 NOTE — NURSING NOTE
Goal Outcome Evaluation:    Neuro: A&Ox4. Hoarse/whispers. Anxious at times, calls appropriately.   Cardiac: SR/ST. VSS. SBP>160 this evening, PRN labetalol given x1.   Respiratory: Sating > 93% on RA.  GI/: Adequate urine output. BM X4; loose stools.  Diet/appetite: NPO. TF at 40 ml/hr; tolerating well.   Activity:  Assist of 2 w/ lift, up to chair. Pt up to wheel chair with son around halls.   Pain: ENT procedure at bedside, pt c/o pain in neck after. Prn oxy given w/ adequate relief.  Skin: No new deficits noted. Wound vac to left stump. Midline incision CDI. C/o of itching at rash areas. Topical creams applied.   LDA's: left piv infusing heparin at 1450 units/hr. Nd w/ TF. Wound vac.     Plan: one time dose of potassium ordered and given. Continue with POC. Notify primary team with changes.     Micheal Husain's goals for this visit include:   Chief Complaint   Patient presents with     Derm Problem     Micheal is visiting to discuss areas on the face     He requests these members of his care team be copied on today's visit information:     PCP: No Ref-Primary, Physician    Referring Provider:  No referring provider defined for this encounter.    There were no vitals taken for this visit.    Do you need any medication refills at today's visit? No

## 2023-12-05 ENCOUNTER — TELEPHONE (OUTPATIENT)
Dept: DERMATOLOGY | Facility: CLINIC | Age: 48
End: 2023-12-05
Payer: COMMERCIAL

## 2023-12-05 NOTE — TELEPHONE ENCOUNTER
[facility] Call Center    Phone Message    May a detailed message be left on voicemail: yes     Reason for Call: Appointment Intake    Referring Provider Name: ESLIE  Diagnosis and/or Symptoms: follow-up Appt for treatment of skin lesion on face. Pt wants the same treatment done. It looks like a possible cosmetic return (unsure). Please review and call pt back to schedule. Thanks     Action Taken: Message routed to:  Other: [facility]    Travel Screening: Not Applicable

## 2023-12-05 NOTE — TELEPHONE ENCOUNTER
12/5 Patient scheduled with Dr. Pond.     Мар[facility]   Dermatology, Surgery, Urology  [facility]

## 2023-12-08 ENCOUNTER — OFFICE VISIT (OUTPATIENT)
Dept: DERMATOLOGY | Facility: CLINIC | Age: 48
End: 2023-12-08
Payer: COMMERCIAL

## 2023-12-08 DIAGNOSIS — D48.9 NEOPLASM OF UNCERTAIN BEHAVIOR: ICD-10-CM

## 2023-12-08 DIAGNOSIS — L82.1 SK (SEBORRHEIC KERATOSIS): Primary | ICD-10-CM

## 2023-12-08 PROCEDURE — 88305 TISSUE EXAM BY PATHOLOGIST: CPT | Performed by: PATHOLOGY

## 2023-12-08 PROCEDURE — 11103 TANGNTL BX SKIN EA SEP/ADDL: CPT | Performed by: DERMATOLOGY

## 2023-12-08 PROCEDURE — 96999 UNLISTED SPEC DERM SVC/PX: CPT | Performed by: DERMATOLOGY

## 2023-12-08 PROCEDURE — 11102 TANGNTL BX SKIN SINGLE LES: CPT | Performed by: DERMATOLOGY

## 2023-12-08 NOTE — LETTER
12/8/2023         RE: Micheal Husain  [address] [facility] [address]        Dear Colleague,    Thank you for referring your patient, Micheal Husain, to the [facility]. Please see a copy of my visit note below.    [facility] Dermatology Note  Encounter Date: Dec 8, 2023  Office Visit     Dermatology Problem List:    0. NUB, 2 mm filiform wart on left intraorbital region. Mostly likely wart.  0. NUB, 2 mm filiform wart on the left superior eyelid. Most likely SK.  1. Dermatosis papulosa nigra - bilateral cheeks, temples, anterior neck  - s/p cosmetic destruction with Hyfrecator 5/29/19, 7/2/19, 2/1/23, 12/8/23    ____________________________________________    Assessment & Plan:    # NUB, 2 mm filiform lesion  on left intraorbital region. Mostly likely wart.  - Snip clip performed today. See procedure note below.    # NUB, 2 mm filiform lesion on the left superior eyelid. Most likely SK.  - Snip clip performed today. See procedure note below.    # Dermatosis papulosa nigra - cheeks and temples.cosmetically bothersome, pt wants treated  - See electrodesiccation note below.    Procedures Performed:   After verbal consent, discussion of risks and beneftis including but not limited to bleeding, infection, scar and recurrence, lesions were prepped with alcohol, <0.5mL of 1% lidocaine with epinephrine was injected to obtain adequate anesthesia of the lesions, the lesions were snip clipped with scissors and submitted to pathology. The patient tolerated the procedure and no complications were noted.      This portion is considered cosmetic. Electrodesiccation: After verbal consent and discussion of risks and benefits were discussed with the patient including the possibility of scar and change in pigmentation, along with patient declining numbing on the cheeks and temples, 14 SK lesions were treated with a Hyfrecator on setting 3. Post treatment instructions were  verbally provided.      Follow-up: pending path results    Staff and Scribe:     Scribe Disclosure:   I, Veronica Higginbotham, am serving as a scribe to document services personally performed by Gaviota Pond MD based on data collection and the provider's statements to me.     Provider Disclosure:   The documentation recorded by the scribe accurately reflects the services I personally performed and the decisions made by me.    Gaviota Pond MD    Department of Dermatology  [facility]: Phone: [phone], Fax:[phone]  [facility]: Phone: [phone], Fax: [phone]   ____________________________________________    CC: Derm Problem (Pt has a skin tag under L eye and dark bumps surrounding both eyes that he would like treated )    HPI:  Mr. Micheal Husain is a(n) 48 year old male who presents today as a return patient for a spot check.    He would like a skin tag under his left eye along with dark bumps surrounding both eyes to be treated as they were previously.    Patient is otherwise feeling well, without additional skin concerns.    Labs Reviewed:  N/A    Physical Exam:  Vitals: There were no vitals taken for this visit.  SKIN: Focused examination of the face was performed.  - 2 mm filiform wart on left intraorbital region.  - 2 mm filiform wart on the left superior eyelid.  - There are waxy stuck on tan to brown papules on the bilateral cheeks and temples.   - No other lesions of concern on areas examined.     Medications:  Current Outpatient Medications   Medication     amLODIPine (NORVASC) 10 MG tablet     aspirin (ASPIRIN LOW DOSE) 81 MG EC tablet     atorvastatin (LIPITOR) 80 MG tablet     metFORMIN (GLUCOPHAGE XR) 500 MG 24 hr tablet     doxepin (SINEQUAN) 10 MG capsule     gabapentin (NEURONTIN) 300 MG capsule     meloxicam (MOBIC) 15 MG tablet     No current facility-administered  medications for this visit.      Past Medical History:   Patient Active Problem List   Diagnosis     Family history of ischemic heart disease     Hyperlipidemia LDL goal <70     Prediabetes     Essential hypertension with goal blood pressure less than 140/90     Coronary artery disease involving native coronary artery of native heart without angina pectoris     Hypertensive kidney disease, stage 1     Status post coronary artery stent placement     Insomnia, unspecified type     Gastroesophageal reflux disease without esophagitis     Acute pain of right shoulder     Past Medical History:   Diagnosis Date     Hyperlipidemia      Hypertension      Pre-diabetes         CC No referring provider defined for this encounter. on close of this encounter.       Again, thank you for allowing me to participate in the care of your patient.        Sincerely,        Gaviota Pond MD

## 2023-12-08 NOTE — PATIENT INSTRUCTIONS
Wound Care After a Destruction of Skin Lesions         What are the risks of a skin procedure?  I will experience scar, bleeding, swelling, pain, crusting and redness. I may experience incomplete removal or recurrence. Risks of this procedure are excessive bleeding, bruising, infection, nerve damage, numbness, thick (hypertrophic or keloidal) scar and non-diagnostic biopsy.    How should I care for my wound for the first 24 hours?  Keep the wound dry and covered with aquaphor or vaseline petroleum jelly only for 24 hours      How should I care for the wound after 24 hours?  After 24 hours   You may bathe or shower with gentle soap and water(vanicream or cetaphil gentle facial cleansers)  If you had a scalp destruction, you can shampoo as usual and can use shower water to clean the biopsy site daily  Clean the wound twice a day with gentle soap and water and apply vaseline or aquaphor until closed.   When closed then transition to cetaphil facial cream or vanicream cream  Do not scrub, be gentle  When applying ointments and creams use a cotton swab or a clean finger   Avoid strenuous activity for first 1-2 days  Avoid lakes, rivers, pools, and oceans until the stitches are removed or the site is healed    How do I clean my wound?  Wash hands thoroughly with soap or use hand  before all wound care  Clean the wound with gentle soap and water  Apply white petroleum/Vaseline  to wound after it is clean  Replace the Bandaid /bandage to keep the wound covered for the first few days or as instructed by your doctor  If you had a scalp biopsy, warm shower water to the area on a daily basis should suffice    What should I use to clean my wound?   Cotton-tipped applicators (Qtips )  White petroleum jelly (Vaseline ). Use a clean new container and use Q-tips to apply.  Bandaids   as needed  Gentle soap     How should I care for my wound long term?  Do not get your wound dirty  Keep up with wound care for one week or  until the area is healed.  A small scab will form and fall off by itself when the area is completely healed. The area will be red and will become pink in color as it heals. Sun protection is very important for how your scar will turn out. Sunscreen with an SPF 30 or greater is recommended once the area is healed.  You should have some soreness but it should be mild and slowly go away over several days. Talk to your doctor about using tylenol for pain,    When should I call my doctor?  If you have increased:   Pain or swelling  Pus or drainage (clear or slightly yellow drainage is ok)  Temperature over 100F  Spreading redness or warmth around wound    When will I hear about my results?  The biopsy results can take 2-3 weeks to come back. The clinic will call you with the results, send you a [employer] message, or have you schedule a follow-up clinic or phone time to discuss the results. Contact our clinics if you do not hear from us in 3 weeks.     Who should I call with questions?  [facility]: [phone]   [facility]: [phone]  For urgent needs outside of business hours call the [facility] at [phone] and ask for the dermatology resident on call

## 2023-12-08 NOTE — PROGRESS NOTES
[facility] Dermatology Note  Encounter Date: Dec 8, 2023  Office Visit     Dermatology Problem List:    0. NUB, 2 mm filiform wart on left intraorbital region. Mostly likely wart.  0. NUB, 2 mm filiform wart on the left superior eyelid. Most likely SK.  1. Dermatosis papulosa nigra - bilateral cheeks, temples, anterior neck  - s/p cosmetic destruction with Hyfrecator 5/29/19, 7/2/19, 2/1/23, 12/8/23    ____________________________________________    Assessment & Plan:    # NUB, 2 mm filiform lesion  on left intraorbital region. Mostly likely wart.  - Snip clip performed today. See procedure note below.    # NUB, 2 mm filiform lesion on the left superior eyelid. Most likely SK.  - Snip clip performed today. See procedure note below.    # Dermatosis papulosa nigra - cheeks and temples.cosmetically bothersome, pt wants treated  - See electrodesiccation note below.    Procedures Performed:   After verbal consent, discussion of risks and beneftis including but not limited to bleeding, infection, scar and recurrence, lesions were prepped with alcohol, <0.5mL of 1% lidocaine with epinephrine was injected to obtain adequate anesthesia of the lesions, the lesions were snip clipped with scissors and submitted to pathology. The patient tolerated the procedure and no complications were noted.      This portion is considered cosmetic. Electrodesiccation: After verbal consent and discussion of risks and benefits were discussed with the patient including the possibility of scar and change in pigmentation, along with patient declining numbing on the cheeks and temples, 14 SK lesions were treated with a Hyfrecator on setting 3. Post treatment instructions were verbally provided.      Follow-up: pending path results    Staff and Scribe:     Scribe Disclosure:   Veronica CABRERA, am serving as a scribe to document services personally performed by Gaviota Pond MD based on data collection and the provider's statements  to me.     Provider Disclosure:   The documentation recorded by the scribe accurately reflects the services I personally performed and the decisions made by me.    Gaviota Pond MD    Department of Dermatology  [facility] [place]note 2]: Phone: [phone], Fax:[phone]  [facility]: Phone: [phone], Fax: [phone]   ____________________________________________    CC: Derm Problem (Pt has a skin tag under L eye and dark bumps surrounding both eyes that he would like treated )    HPI:  Mr. Micheal Husain is a(n) 48 year old male who presents today as a return patient for a spot check.    He would like a skin tag under his left eye along with dark bumps surrounding both eyes to be treated as they were previously.    Patient is otherwise feeling well, without additional skin concerns.    Labs Reviewed:  N/A    Physical Exam:  Vitals: There were no vitals taken for this visit.  SKIN: Focused examination of the face was performed.  - 2 mm filiform wart on left intraorbital region.  - 2 mm filiform wart on the left superior eyelid.  - There are waxy stuck on tan to brown papules on the bilateral cheeks and temples.   - No other lesions of concern on areas examined.     Medications:  Current Outpatient Medications   Medication    amLODIPine (NORVASC) 10 MG tablet    aspirin (ASPIRIN LOW DOSE) 81 MG EC tablet    atorvastatin (LIPITOR) 80 MG tablet    metFORMIN (GLUCOPHAGE XR) 500 MG 24 hr tablet    doxepin (SINEQUAN) 10 MG capsule    gabapentin (NEURONTIN) 300 MG capsule    meloxicam (MOBIC) 15 MG tablet     No current facility-administered medications for this visit.      Past Medical History:   Patient Active Problem List   Diagnosis    Family history of ischemic heart disease    Hyperlipidemia LDL goal <70    Prediabetes    Essential hypertension with goal blood pressure less than 140/90    Coronary  artery disease involving native coronary artery of native heart without angina pectoris    Hypertensive kidney disease, stage 1    Status post coronary artery stent placement    Insomnia, unspecified type    Gastroesophageal reflux disease without esophagitis    Acute pain of right shoulder     Past Medical History:   Diagnosis Date    Hyperlipidemia     Hypertension     Pre-diabetes         CC No referring provider defined for this encounter. on close of this encounter.

## 2023-12-08 NOTE — NURSING NOTE
The following medication was given:     MEDICATION:  Lidocaine with epinephrine 1% 1:[id number]  ROUTE: SQ  SITE: see procedure note  DOSE: 2 mls  LOT #: [id number]  : [employer]  EXPIRATION DATE: 2/28/25  NDC#: 72721-935-55  Was there drug waste? Yes, 1 ml  Multi-dose vial: Yes    Pastora Roy RN  December 8, 2023

## 2023-12-08 NOTE — NURSING NOTE
Micheal Husain's chief complaint for this visit includes:  Chief Complaint   Patient presents with    Derm Problem     Pt has a skin tag under L eye and dark bumps surrounding both eyes that he would like treated      PCP: No Ref-Primary, Physician    Referring Provider:  No referring provider defined for this encounter.    There were no vitals taken for this visit.  Data Unavailable        Allergies   Allergen Reactions    Hydrochlorothiazide Other (See Comments) and Fatigue     hyponatremia    Metoprolol Other (See Comments)     ED         Do you need any medication refills at today's visit? No

## 2023-12-12 LAB
PATH REPORT.COMMENTS IMP SPEC: NORMAL
PATH REPORT.COMMENTS IMP SPEC: NORMAL
PATH REPORT.FINAL DX SPEC: NORMAL
PATH REPORT.GROSS SPEC: NORMAL
PATH REPORT.MICROSCOPIC SPEC OTHER STN: NORMAL
PATH REPORT.RELEVANT HX SPEC: NORMAL

## 2023-12-14 ENCOUNTER — THERAPY VISIT (OUTPATIENT)
Dept: PHYSICAL THERAPY | Facility: CLINIC | Age: 48
End: 2023-12-14
Payer: COMMERCIAL

## 2023-12-14 DIAGNOSIS — M25.511 ACUTE PAIN OF RIGHT SHOULDER: Primary | ICD-10-CM

## 2023-12-14 PROCEDURE — 97110 THERAPEUTIC EXERCISES: CPT | Mod: GP | Performed by: PHYSICAL THERAPIST

## 2023-12-22 DIAGNOSIS — R73.03 PREDIABETES: ICD-10-CM

## 2023-12-28 RX ORDER — METFORMIN HCL 500 MG
TABLET, EXTENDED RELEASE 24 HR ORAL
Qty: 90 TABLET | Refills: 0 | Status: SHIPPED | OUTPATIENT
Start: 2023-12-28 | End: 2024-01-30

## 2023-12-31 NOTE — RESULT ENCOUNTER NOTE
Dear Micheal Husain,    We are writing to inform you of your test results that show a harmless keratosis.     Thank you for taking the time to be seen in our dermatology clinic. If you have further questions or concerns, please contact the clinic(see phone number listed below).      Sincerely,    Gaviota Pond MD    Department of Dermatology  [facility]: Phone: [phone], Fax:[phone]  [facility]: Phone: [phone], Fax: [phone]

## 2024-01-05 ENCOUNTER — TELEPHONE (OUTPATIENT)
Dept: DERMATOLOGY | Facility: CLINIC | Age: 49
End: 2024-01-05
Payer: COMMERCIAL

## 2024-01-05 NOTE — TELEPHONE ENCOUNTER
Called patient and relayed the following information:      Dear Micheal Husain,     We are writing to inform you of your test results that show a harmless keratosis.     Thank you for taking the time to be seen in our dermatology clinic. If you have further questions or concerns, please contact the clinic(see phone number listed below).        Sincerely,     Gaviota Pond MD

## 2024-01-08 DIAGNOSIS — I25.10 CORONARY ARTERY DISEASE INVOLVING NATIVE CORONARY ARTERY OF NATIVE HEART WITHOUT ANGINA PECTORIS: ICD-10-CM

## 2024-01-08 RX ORDER — ASPIRIN 81 MG/1
81 TABLET, COATED ORAL DAILY
Qty: 90 TABLET | Refills: 0 | Status: SHIPPED | OUTPATIENT
Start: 2024-01-08 | End: 2024-01-30

## 2024-01-30 ENCOUNTER — OFFICE VISIT (OUTPATIENT)
Dept: FAMILY MEDICINE | Facility: CLINIC | Age: 49
End: 2024-01-30
Payer: COMMERCIAL

## 2024-01-30 VITALS
TEMPERATURE: 97.5 F | DIASTOLIC BLOOD PRESSURE: 72 MMHG | HEART RATE: 77 BPM | HEIGHT: 67 IN | RESPIRATION RATE: 18 BRPM | BODY MASS INDEX: 24.48 KG/M2 | OXYGEN SATURATION: 98 % | SYSTOLIC BLOOD PRESSURE: 115 MMHG | WEIGHT: 156 LBS

## 2024-01-30 DIAGNOSIS — G47.00 INSOMNIA, UNSPECIFIED TYPE: ICD-10-CM

## 2024-01-30 DIAGNOSIS — I25.10 CORONARY ARTERY DISEASE INVOLVING NATIVE CORONARY ARTERY OF NATIVE HEART WITHOUT ANGINA PECTORIS: ICD-10-CM

## 2024-01-30 DIAGNOSIS — Z28.21 VACCINATION NOT CARRIED OUT BECAUSE OF PATIENT REFUSAL: ICD-10-CM

## 2024-01-30 DIAGNOSIS — E78.5 HYPERLIPIDEMIA LDL GOAL <70: ICD-10-CM

## 2024-01-30 DIAGNOSIS — I10 ESSENTIAL HYPERTENSION WITH GOAL BLOOD PRESSURE LESS THAN 140/90: ICD-10-CM

## 2024-01-30 DIAGNOSIS — R73.03 PREDIABETES: ICD-10-CM

## 2024-01-30 DIAGNOSIS — Z00.00 ROUTINE GENERAL MEDICAL EXAMINATION AT A HEALTH CARE FACILITY: Primary | ICD-10-CM

## 2024-01-30 DIAGNOSIS — R06.83 SNORING: ICD-10-CM

## 2024-01-30 DIAGNOSIS — Z23 NEED FOR VACCINATION: ICD-10-CM

## 2024-01-30 DIAGNOSIS — Z12.11 SCREEN FOR COLON CANCER: ICD-10-CM

## 2024-01-30 PROCEDURE — 99396 PREV VISIT EST AGE 40-64: CPT | Mod: 25 | Performed by: FAMILY MEDICINE

## 2024-01-30 PROCEDURE — 90471 IMMUNIZATION ADMIN: CPT | Performed by: FAMILY MEDICINE

## 2024-01-30 PROCEDURE — 99214 OFFICE O/P EST MOD 30 MIN: CPT | Mod: 25 | Performed by: FAMILY MEDICINE

## 2024-01-30 PROCEDURE — 90746 HEPB VACCINE 3 DOSE ADULT IM: CPT | Performed by: FAMILY MEDICINE

## 2024-01-30 RX ORDER — ATORVASTATIN CALCIUM 80 MG/1
80 TABLET, FILM COATED ORAL DAILY
Qty: 90 TABLET | Refills: 0 | Status: CANCELLED | OUTPATIENT
Start: 2024-01-30

## 2024-01-30 RX ORDER — METFORMIN HCL 500 MG
500 TABLET, EXTENDED RELEASE 24 HR ORAL
Qty: 90 TABLET | Refills: 3 | Status: SHIPPED | OUTPATIENT
Start: 2024-01-30

## 2024-01-30 RX ORDER — ASPIRIN 81 MG/1
81 TABLET ORAL DAILY
Qty: 90 TABLET | Refills: 3 | Status: SHIPPED | OUTPATIENT
Start: 2024-01-30

## 2024-01-30 RX ORDER — METFORMIN HCL 500 MG
500 TABLET, EXTENDED RELEASE 24 HR ORAL
Qty: 90 TABLET | Refills: 0 | Status: SHIPPED | OUTPATIENT
Start: 2024-01-30 | End: 2024-01-30

## 2024-01-30 RX ORDER — ATORVASTATIN CALCIUM 80 MG/1
80 TABLET, FILM COATED ORAL DAILY
Qty: 90 TABLET | Refills: 3 | Status: SHIPPED | OUTPATIENT
Start: 2024-01-30

## 2024-01-30 RX ORDER — ATORVASTATIN CALCIUM 80 MG/1
80 TABLET, FILM COATED ORAL DAILY
Qty: 90 TABLET | Refills: 0 | Status: SHIPPED | OUTPATIENT
Start: 2024-01-30 | End: 2024-01-30

## 2024-01-30 RX ORDER — ASPIRIN 81 MG/1
81 TABLET ORAL DAILY
Qty: 90 TABLET | Refills: 0 | Status: SHIPPED | OUTPATIENT
Start: 2024-01-30 | End: 2024-01-30

## 2024-01-30 RX ORDER — AMLODIPINE BESYLATE 10 MG/1
10 TABLET ORAL DAILY
Qty: 90 TABLET | Refills: 3 | Status: SHIPPED | OUTPATIENT
Start: 2024-01-30

## 2024-01-30 ASSESSMENT — ENCOUNTER SYMPTOMS
WEAKNESS: 0
FREQUENCY: 0
NERVOUS/ANXIOUS: 0
MYALGIAS: 0
SORE THROAT: 0
SHORTNESS OF BREATH: 0
CHILLS: 0
JOINT SWELLING: 0
ABDOMINAL PAIN: 0
FEVER: 0
PARESTHESIAS: 0
HEARTBURN: 0
DIZZINESS: 0
PALPITATIONS: 0
HEADACHES: 0
ARTHRALGIAS: 0
HEMATOCHEZIA: 0
EYE PAIN: 0
HEMATURIA: 0
CONSTIPATION: 0
DIARRHEA: 0
NAUSEA: 0
COUGH: 0
DYSURIA: 0

## 2024-01-30 ASSESSMENT — PAIN SCALES - GENERAL: PAINLEVEL: NO PAIN (0)

## 2024-01-30 NOTE — PROGRESS NOTES
Preventive Care Visit  [facility]  Lamin Parson MD, Family Medicine  Jan 30, 2024      SUBJECTIVE:   Micheal is a 48 year old, presenting for the following:  Physical        1/30/2024     4:04 PM   Additional Questions   Roomed by sangita ruiz   Accompanied by none         1/30/2024     4:04 PM   Patient Reported Additional Medications   Patient reports taking the following new medications none     Healthy Habits:     Getting at least 3 servings of Calcium per day:  Yes    Bi-annual eye exam:  Yes    Dental care twice a year:  Yes    Sleep apnea or symptoms of sleep apnea:  None    Diet:  Regular (no restrictions)    Frequency of exercise:  4-5 days/week    Duration of exercise:  15-30 minutes    Taking medications regularly:  Yes    Barriers to taking medications:  None    Medication side effects:  Not applicable    Additional concerns today:  No      Today's PHQ-2 Score:       1/30/2024     4:03 PM   PHQ-2 ( 1999 [employer])   Q1: Little interest or pleasure in doing things 0   Q2: Feeling down, depressed or hopeless 0   PHQ-2 Score 0   Q1: Little interest or pleasure in doing things Not at all   Q2: Feeling down, depressed or hopeless Not at all   PHQ-2 Score 0       Hyperlipidemia Follow-Up    Are you regularly taking any medication or supplement to lower your cholesterol?   Yes- atorvastatin  Are you having muscle aches or other side effects that you think could be caused by your cholesterol lowering medication?  No    Social History     Tobacco Use    Smoking status: Former     Packs/day: 0.20     Years: 8.00     Additional pack years: 0.00     Total pack years: 1.60     Types: Cigarettes    Smokeless tobacco: Former     Quit date: 10/1/2019    Tobacco comments:     smoked less than 10 years, 3-5 cigarettes/day   Substance Use Topics    Alcohol use: Never             1/30/2024     4:03 PM   Alcohol Use   Prescreen: >3 drinks/day or >7 drinks/week? Not Applicable       Last PSA:  "  PSA   Date Value Ref Range Status   09/21/2020 0.46 0 - 4 ug/L Final     Comment:     Assay Method:  Chemiluminescence using Siemens Vista analyzer         Review of Systems   Constitutional:  Negative for chills and fever.   HENT:  Negative for congestion, ear pain, hearing loss and sore throat.    Eyes:  Negative for pain and visual disturbance.   Respiratory:  Negative for cough and shortness of breath.    Cardiovascular:  Negative for chest pain and palpitations.   Gastrointestinal:  Negative for abdominal pain, constipation, diarrhea and nausea.   Genitourinary:  Negative for dysuria, frequency, genital sores, hematuria, penile discharge and urgency.   Musculoskeletal:  Negative for arthralgias, joint swelling and myalgias.   Skin:  Negative for rash.   Neurological:  Negative for dizziness, weakness and headaches.   Psychiatric/Behavioral:  The patient is not nervous/anxious.      Patient complains of tiredness during the day. He reports lack of motivation to do anything such as exercising etc. He thinks he snores during the night when sleeping.     OBJECTIVE:   /72   Pulse 77   Temp 97.5  F (36.4  C) (Tympanic)   Resp 18   Ht 1.702 m (5' 7.01\")   Wt 70.8 kg (156 lb)   SpO2 98%   BMI 24.43 kg/m     Estimated body mass index is 24.43 kg/m  as calculated from the following:    Height as of this encounter: 1.702 m (5' 7.01\").    Weight as of this encounter: 70.8 kg (156 lb).  Physical Exam  GENERAL: alert and no distress  EYES: Eyes grossly normal to inspection, PERRL and conjunctivae and sclerae normal  HENT: ear canals and TM's normal, nose and mouth without ulcers or lesions  NECK: no adenopathy, no asymmetry, masses, or scars  RESP: lungs clear to auscultation - no rales, rhonchi or wheezes  CV: regular rate and rhythm, normal S1 S2, no S3 or S4, no murmur, click or rub, no peripheral edema  ABDOMEN: soft, nontender, no hepatosplenomegaly, no masses and bowel sounds normal   (male): normal " male genitalia without lesions or urethral discharge, no hernia  MS: no gross musculoskeletal defects noted, no edema  SKIN: no suspicious lesions or rashes  NEURO: Normal strength and tone, mentation intact and speech normal. Reflexes normal and symmetric in the upper and lower extremities.   PSYCH: mentation appears normal, affect normal/bright  LYMPH: no cervical, supraclavicular, axillary, or inguinal adenopathy         ASSESSMENT/PLAN:   (Z00.00) Routine general medical examination at a health care facility  (primary encounter diagnosis)  Comment: Negative screening exam; up-to-date on preventive services.   Plan: HEPATITIS B, ADULT 20+ (ENGERIX-B/RECOMBIVAX         HB)        Return in about 1 year (around 1/30/2025) for full physical, cholesterol, blood pressure check.      (I25.10) Coronary artery disease involving native coronary artery of native heart without angina pectoris  (E78.5) Hyperlipidemia LDL goal <70  Comment: clinically stable. He may have statin myopathy perceived as tiredness.  Plan: Lipid panel reflex to direct LDL Non-fasting,         ALT, aspirin (ASPIRIN LOW DOSE) 81 MG EC         tablet, atorvastatin (LIPITOR) 80 MG tablet,              Hold atorvastatin for 5 weeks, then resume. follow up with Cardiology on 3/14/24. Return in about 1 year (around 1/30/2025) for full physical, cholesterol, blood pressure check.      (R73.03) Prediabetes  Comment:   Plan: BASIC METABOLIC PANEL, Hemoglobin A1c,         metFORMIN (GLUCOPHAGE XR) 500 MG 24 hr tablet,             (I10) Essential hypertension with goal blood pressure less than 140/90  Comment: well controlled.   Plan: BASIC METABOLIC PANEL, Albumin Random Urine         Quantitative with Creat Ratio, amLODIPine         (NORVASC) 10 MG tablet, CBC with platelets and         differential         Return in about 1 year (around 1/30/2025) for full physical, cholesterol, blood pressure check.      (Z12.11) Screen for colon cancer  Comment: He  canceled his last colonoscopy because he got nervous.   Plan: Colonoscopy Screening  Referral            (R06.83) Snoring  (G47.00) Insomnia, unspecified type  Comment: He complains of tiredness but not necessarily muscle fatigue or sleepiness. His symptom description is rather vague, but he does note sleeping 4-5 hours per night often as well as snoring.   Plan: Adult Sleep Eval & Management          Referral            (Z23) Need for vaccination  Comment:   Plan: HEPATITIS B, ADULT 20+ (ENGERIX-B/RECOMBIVAX         HB)            (Z28.21) Vaccination not carried out because of patient refusal  Comment: COVID-19   Plan:           Counseling  Reviewed preventive health counseling, as reflected in patient instructions  Special attention given to:        Regular exercise       Immunizations  Vaccinated for: Hepatitis B and Declined: Covid-19 due to ?             Colorectal cancer screening        He reports that he has quit smoking. His smoking use included cigarettes. He has a 1.6 pack-year smoking history. He quit smokeless tobacco use about 4 years ago.            Signed Electronically by: Lamin Parson MD    This document serves as a record of the services and decisions personally performed and made by Dr. Parson. It was created on his behalf by Lobo Spivey, a trained medical scribe. The creation of this document is based the provider's statements to the medical scribe.  Lobo Spivey,  5:35 PM        Answers submitted by the patient for this visit:  Annual Preventive Visit (Submitted on 1/30/2024)  Chief Complaint: Annual Exam:  Blood in stool: No  heartburn: No  peripheral edema: No  mood changes: No  Skin sensation changes: No  impotence: No

## 2024-02-01 ENCOUNTER — LAB (OUTPATIENT)
Dept: LAB | Facility: CLINIC | Age: 49
End: 2024-02-01
Payer: COMMERCIAL

## 2024-02-01 DIAGNOSIS — I10 ESSENTIAL HYPERTENSION WITH GOAL BLOOD PRESSURE LESS THAN 140/90: ICD-10-CM

## 2024-02-01 DIAGNOSIS — E78.5 HYPERLIPIDEMIA LDL GOAL <70: ICD-10-CM

## 2024-02-01 DIAGNOSIS — I25.10 CORONARY ARTERY DISEASE INVOLVING NATIVE CORONARY ARTERY OF NATIVE HEART WITHOUT ANGINA PECTORIS: ICD-10-CM

## 2024-02-01 DIAGNOSIS — R73.03 PREDIABETES: ICD-10-CM

## 2024-02-01 DIAGNOSIS — G47.00 INSOMNIA, UNSPECIFIED TYPE: ICD-10-CM

## 2024-02-01 LAB
ALBUMIN SERPL BCG-MCNC: 5 G/DL (ref 3.5–5.2)
ALP SERPL-CCNC: 69 U/L (ref 40–150)
ALT SERPL W P-5'-P-CCNC: 34 U/L (ref 0–70)
ANION GAP SERPL CALCULATED.3IONS-SCNC: 10 MMOL/L (ref 7–15)
AST SERPL W P-5'-P-CCNC: 31 U/L (ref 0–45)
BASOPHILS # BLD AUTO: 0 10E3/UL (ref 0–0.2)
BASOPHILS NFR BLD AUTO: 1 %
BILIRUB DIRECT SERPL-MCNC: <0.2 MG/DL (ref 0–0.3)
BILIRUB SERPL-MCNC: 0.3 MG/DL
BUN SERPL-MCNC: 12.1 MG/DL (ref 6–20)
CALCIUM SERPL-MCNC: 9.4 MG/DL (ref 8.6–10)
CHLORIDE SERPL-SCNC: 104 MMOL/L (ref 98–107)
CHOLEST SERPL-MCNC: 122 MG/DL
CK SERPL-CCNC: 200 U/L (ref 39–308)
CREAT SERPL-MCNC: 0.9 MG/DL (ref 0.67–1.17)
CREAT UR-MCNC: 133 MG/DL
DEPRECATED HCO3 PLAS-SCNC: 27 MMOL/L (ref 22–29)
EGFRCR SERPLBLD CKD-EPI 2021: >90 ML/MIN/1.73M2
EOSINOPHIL # BLD AUTO: 0.1 10E3/UL (ref 0–0.7)
EOSINOPHIL NFR BLD AUTO: 2 %
ERYTHROCYTE [DISTWIDTH] IN BLOOD BY AUTOMATED COUNT: 13.8 % (ref 10–15)
FASTING STATUS PATIENT QL REPORTED: YES
FERRITIN SERPL-MCNC: 139 NG/ML (ref 31–409)
GLUCOSE SERPL-MCNC: 104 MG/DL (ref 70–99)
HBA1C MFR BLD: 5.9 % (ref 0–5.6)
HCT VFR BLD AUTO: 41.8 % (ref 40–53)
HDLC SERPL-MCNC: 39 MG/DL
HGB BLD-MCNC: 13.4 G/DL (ref 13.3–17.7)
IMM GRANULOCYTES # BLD: 0 10E3/UL
IMM GRANULOCYTES NFR BLD: 1 %
IRON BINDING CAPACITY (ROCHE): 374 UG/DL (ref 240–430)
IRON SATN MFR SERPL: 24 % (ref 15–46)
IRON SERPL-MCNC: 89 UG/DL (ref 61–157)
LDLC SERPL CALC-MCNC: 65 MG/DL
LYMPHOCYTES # BLD AUTO: 2.8 10E3/UL (ref 0.8–5.3)
LYMPHOCYTES NFR BLD AUTO: 36 %
MCH RBC QN AUTO: 27.1 PG (ref 26.5–33)
MCHC RBC AUTO-ENTMCNC: 32.1 G/DL (ref 31.5–36.5)
MCV RBC AUTO: 84 FL (ref 78–100)
MICROALBUMIN UR-MCNC: <12 MG/L
MICROALBUMIN/CREAT UR: NORMAL MG/G{CREAT}
MONOCYTES # BLD AUTO: 0.7 10E3/UL (ref 0–1.3)
MONOCYTES NFR BLD AUTO: 10 %
NEUTROPHILS # BLD AUTO: 3.9 10E3/UL (ref 1.6–8.3)
NEUTROPHILS NFR BLD AUTO: 50 %
NONHDLC SERPL-MCNC: 83 MG/DL
NRBC # BLD AUTO: 0 10E3/UL
NRBC BLD AUTO-RTO: 0 /100
PLATELET # BLD AUTO: 286 10E3/UL (ref 150–450)
POTASSIUM SERPL-SCNC: 4.4 MMOL/L (ref 3.4–5.3)
PROT SERPL-MCNC: 7.6 G/DL (ref 6.4–8.3)
RBC # BLD AUTO: 4.95 10E6/UL (ref 4.4–5.9)
SODIUM SERPL-SCNC: 141 MMOL/L (ref 135–145)
TRIGL SERPL-MCNC: 88 MG/DL
TSH SERPL DL<=0.005 MIU/L-ACNC: 1.93 UIU/ML (ref 0.3–4.2)
WBC # BLD AUTO: 7.6 10E3/UL (ref 4–11)

## 2024-02-01 PROCEDURE — 82570 ASSAY OF URINE CREATININE: CPT

## 2024-02-01 PROCEDURE — 84443 ASSAY THYROID STIM HORMONE: CPT

## 2024-02-01 PROCEDURE — 82550 ASSAY OF CK (CPK): CPT

## 2024-02-01 PROCEDURE — 36415 COLL VENOUS BLD VENIPUNCTURE: CPT

## 2024-02-01 PROCEDURE — 80061 LIPID PANEL: CPT

## 2024-02-01 PROCEDURE — 82043 UR ALBUMIN QUANTITATIVE: CPT

## 2024-02-01 PROCEDURE — 80053 COMPREHEN METABOLIC PANEL: CPT

## 2024-02-01 PROCEDURE — 83550 IRON BINDING TEST: CPT

## 2024-02-01 PROCEDURE — 83036 HEMOGLOBIN GLYCOSYLATED A1C: CPT

## 2024-02-01 PROCEDURE — 82248 BILIRUBIN DIRECT: CPT

## 2024-02-01 PROCEDURE — 85025 COMPLETE CBC W/AUTO DIFF WBC: CPT

## 2024-02-01 PROCEDURE — 83540 ASSAY OF IRON: CPT

## 2024-02-01 PROCEDURE — 82728 ASSAY OF FERRITIN: CPT

## 2024-02-20 ENCOUNTER — TELEPHONE (OUTPATIENT)
Dept: GASTROENTEROLOGY | Facility: CLINIC | Age: 49
End: 2024-02-20
Payer: COMMERCIAL

## 2024-02-20 NOTE — TELEPHONE ENCOUNTER
"Endoscopy Scheduling Screen    Have you had a positive Covid test in the last 14 days?  No    Are you active on MyChart?   Yes    What insurance is in the chart?  Other:  HP    Ordering/Referring Provider: Blank   (If ordering provider performs procedure, schedule with ordering provider unless otherwise instructed. )    BMI: Estimated body mass index is 24.43 kg/m  as calculated from the following:    Height as of 1/30/24: 1.702 m (5' 7.01\").    Weight as of 1/30/24: 70.8 kg (156 lb).     Sedation Ordered  moderate sedation.   If patient BMI > 50 do not schedule in ASC.    If patient BMI > 45 do not schedule at [facility].    Are you taking methadone or Suboxone?  No    Have you had difficulties, pain, or discomfort during past endoscopy procedures?  No    Are you taking any prescription medications for pain 3 or more times per week?   NO - No RN review required.    Do you have a history of malignant hyperthermia or adverse reaction to anesthesia?  No    (Females) Are you currently pregnant?        Have you been diagnosed or told you have pulmonary hypertension?   No    Do you have an LVAD?  No    Have you been told you have moderate to severe sleep apnea?  No    Have you been told you have COPD, asthma, or any other lung disease?  No    Do you have any heart conditions?  Yes-stents placed October 2019     In the past year, have you had any hospitalizations for heart related issues including cardiomyopathy, heart attack, or stent placement?  No    Do you have any implantable devices in your body (pacemaker, ICD)?  No    Do you take nitroglycerine?  No    Have you ever had an organ transplant?   No    Have you ever had or are you awaiting a heart or lung transplant?   No    Have you had a stroke or transient ischemic attack (TIA aka \"mini stroke\" in the last 6 months?   No    Have you been diagnosed with or been told you have cirrhosis of the liver?   No    Are you currently on dialysis?   No    Do you need assistance " "transferring?   No    BMI: Estimated body mass index is 24.43 kg/m  as calculated from the following:    Height as of 1/30/24: 1.702 m (5' 7.01\").    Weight as of 1/30/24: 70.8 kg (156 lb).     Is patients BMI > 40 and scheduling location [facility]?  No    Do you take an injectable medication for weight loss or diabetes (excluding insulin)?  No    Do you take the medication Naltrexone?  No    Do you take blood thinners?  No       Prep   Are you currently on dialysis or do you have chronic kidney disease?  No    Do you have a diagnosis of diabetes?  Yes (Golytely Prep)    Do you have a diagnosis of cystic fibrosis (CF)?  No    On a regular basis do you go 3 -5 days between bowel movements?  No    BMI > 40?  No    Preferred Pharmacy:    [facility] [address] [place] [facility] [address] [facility]  [address] [facility] [address]  Phone: [phone] Fax: [phone]      Final Scheduling Details   Colonoscopy prep sent?  N/A    Procedure scheduled  Colonoscopy    Surgeon:  Margie     Date of procedure:  3/8/24     Pre-OP / PAC:   No - Not required for this site.    Location  [facility] - [facility] - Patient preference.    Sedation   Moderate Sedation - Per order.      Patient Reminders:   You will receive a call from a Nurse to review instructions and health history.  This assessment must be completed prior to your procedure.  Failure to complete the Nurse assessment may result in the procedure being cancelled.      On the day of your procedure, please designate an adult(s) who can drive you home stay with you for the next 24 hours. The medicines used in the exam will make you sleepy. You will not be able to drive.      You cannot take public transportation, ride share services, or non-medical taxi service without a responsible caregiver.  Medical transport services are allowed with the requirement that a responsible caregiver will receive you at your destination.  We require that drivers and caregivers are confirmed prior " to your procedure.

## 2024-02-24 ENCOUNTER — TELEPHONE (OUTPATIENT)
Dept: GASTROENTEROLOGY | Facility: CLINIC | Age: 49
End: 2024-02-24

## 2024-02-24 DIAGNOSIS — Z12.11 SCREEN FOR COLON CANCER: Primary | ICD-10-CM

## 2024-02-24 RX ORDER — BISACODYL 5 MG/1
TABLET, DELAYED RELEASE ORAL
Qty: 4 TABLET | Refills: 0 | Status: SHIPPED | OUTPATIENT
Start: 2024-02-24 | End: 2024-09-04

## 2024-02-24 NOTE — TELEPHONE ENCOUNTER
Pre visit planning completed.      Procedure details:    Patient scheduled for Colonoscopy  on 3/8/24.     Arrival time: 0945. Procedure time 1030    Pre op exam needed? N/A    Facility location: [facility]; [address], [place] [address]    Sedation type: Conscious sedation     Indication for procedure: Screen for colon cancer       Chart review:     Electronic implanted devices? No    Recent diagnosis of diverticulitis within the last 6 weeks? No    Diabetic? Yes. See medication holding recommendations.     Diabetic medication HOLDING recommendations: (if applicable)  Oral diabetic medications: Yes:  Metformin (glucophage): HOLD day of procedure.  Diabetic injectables: No  Insulin: No      Medication review:    Anticoagulants? No    NSAIDS? No NSAID medications per patient's medication list.  RN will verify with pre-assessment call.    Other medication HOLDING recommendations:  N/A      Prep for procedure:     Bowel prep recommendation: Standard Golytely    Due to:  diabetes.     Prep instructions sent via [employer] Bowel prep script sent to [facility] [id number] [place] [facility] - [place], [place] - [address] [facility] [address]        Corinne Kliber, RN  Endoscopy Procedure Pre Assessment RN  [phone] option 4

## 2024-02-26 ENCOUNTER — MYC MEDICAL ADVICE (OUTPATIENT)
Dept: GASTROENTEROLOGY | Facility: CLINIC | Age: 49
End: 2024-02-26
Payer: COMMERCIAL

## 2024-02-26 NOTE — TELEPHONE ENCOUNTER
Caller:     Reason for Reschedule/Cancellation   (please be detailed, any staff messages or encounters to note?): WORK      Prior to reschedule please review:  Ordering Provider: LAKHWINDER ORDONEZ   Sedation Determined: CS  Does patient have any ASC Exclusions, please identify?:       Notes on Cancelled Procedure:  Procedure: Lower Endoscopy [Colonoscopy]   Date: 3/8  Location: [facility]; [address] [place] [address], [address], [place] [address]  Surgeon: LEELA      Rescheduled: Yes,   Procedure: Lower Endoscopy [Colonoscopy]    Date: 5/10   Location: [facility]; [address] [place] [address], [address], [place] [address]   Surgeon: HEAVEN   Sedation Level Scheduled  CS,  Reason for Sedation Level ORDER   Instructions updated and sent: Y    Does patient need PAC or Pre -Op Rescheduled? : N       Did you cancel or rescheduled an EUS procedure? No.

## 2024-02-26 NOTE — TELEPHONE ENCOUNTER
Attempted to contact patient in order to complete pre assessment questions.     No answer. Left message to return call to [phone] option 4    Missed call communication sent via [employer].    Ayesha Flores RN

## 2024-03-12 ENCOUNTER — TELEPHONE (OUTPATIENT)
Dept: GASTROENTEROLOGY | Facility: CLINIC | Age: 49
End: 2024-03-12
Payer: COMMERCIAL

## 2024-03-12 NOTE — TELEPHONE ENCOUNTER
Caller: Michela Husain      Reason for Reschedule/Cancellation   (please be detailed, any staff messages or encounters to note?): Patient request to reschedule due to conflict with work and wanted an earlier date      Prior to reschedule please review:  Ordering Provider: Lamin Parson MD  Sedation Determined: MODERATE  Does patient have any ASC Exclusions, please identify?: NO      Notes on Cancelled Procedure:  Procedure: Lower Endoscopy [Colonoscopy]   Date: 5/10  Location: [facility]; [address] [place] [address], [address], [place] [address]  Surgeon: HEAVEN      Rescheduled: Yes,   Procedure: Lower Endoscopy [Colonoscopy]    Date: 3/28   Location: [facility]; [address] [place] [address], [address], [place] [address]   Surgeon: CHI   Sedation Level Scheduled  MODERATE ,  Reason for Sedation Level PER ORDER   Instructions updated and sent: JOSIANEHART     Does patient need PAC or Pre -Op Rescheduled? : NO       Did you cancel or rescheduled an EUS procedure? No.

## 2024-03-12 NOTE — PROGRESS NOTES
DISCHARGE  Reason for Discharge: Patient chooses to discontinue therapy.    Equipment Issued:     Discharge Plan: Patient to continue home program.    Referring Provider:  Eliud Corral

## 2024-03-14 ENCOUNTER — OFFICE VISIT (OUTPATIENT)
Dept: CARDIOLOGY | Facility: CLINIC | Age: 49
End: 2024-03-14
Payer: COMMERCIAL

## 2024-03-14 VITALS
HEART RATE: 82 BPM | SYSTOLIC BLOOD PRESSURE: 125 MMHG | DIASTOLIC BLOOD PRESSURE: 80 MMHG | HEIGHT: 67 IN | WEIGHT: 149 LBS | OXYGEN SATURATION: 98 % | BODY MASS INDEX: 23.39 KG/M2

## 2024-03-14 DIAGNOSIS — E78.2 MIXED HYPERLIPIDEMIA: ICD-10-CM

## 2024-03-14 DIAGNOSIS — I25.10 CORONARY ARTERY DISEASE INVOLVING NATIVE CORONARY ARTERY OF NATIVE HEART WITHOUT ANGINA PECTORIS: Primary | ICD-10-CM

## 2024-03-14 DIAGNOSIS — I10 ESSENTIAL HYPERTENSION WITH GOAL BLOOD PRESSURE LESS THAN 140/90: ICD-10-CM

## 2024-03-14 PROCEDURE — 99214 OFFICE O/P EST MOD 30 MIN: CPT | Performed by: INTERNAL MEDICINE

## 2024-03-14 NOTE — PATIENT INSTRUCTIONS
Take your medicines every day, as directed     Changes made today:  Hold atorvastatin for 2 weeks   RN Call in 2 weeks to follow up on symptoms  Based on how you are doing in 2 weeks, you may resume statin or start a different medication       Cardiology Care Coordinators:      Ligia CHAMBERS RN     Cardiology Rooming staff:  Yulissa TRAYLOR CNA    Phone  [phone]      Fax [phone]    To Contact us     During Business Hours:  [phone]     If you are needing refills please contact your pharmacy.     For urgent after hour care please call the [facility] at [phone] or the [facility] at [phone] and ask to speak to the cardiologist on call.            HOW TO CHECK YOUR BLOOD PRESSURE AT HOME:     Avoid eating, smoking, and exercising for at least 30 minutes before taking a reading.     Be sure you have taken your BP medication at least 2-3 hours before you check it.      Sit quietly for 10 minutes before a reading.      Sit in a chair with your feet flat on the floor. Rest your  arm on a table so that the arm cuff is at the same level as your heart.     Remain still during the reading.  Record your blood pressure and pulse in a log and bring to your next appointment.       Use [employer] allows you to communicate directly with your heart team through secure messaging.  Press About Us can be accessed any time on your phone, computer, or tablet.  If you need assistance, we'd be happy to help!             Keep your Heart Appointments:     Follow up in 1 year with fasting labs prior and EKG same day

## 2024-03-14 NOTE — PROGRESS NOTES
Chief complaint: Follow up of chronic cardiovascular conditions.     HPI:   Micheal Husain is a 48 year old male with history of CAD s/p PCI/LCx stent in 2019 who presents for follow up of his chronic cardiovascular conditions.    Cardiac history:  He was previously seen by Dr. Yayo Workman (last in July 2021), and by Dr. Ariana Betancourt 3/1/22 and 11/15/22. At the time of follow up with Dr Workman, the patient had been doing some light jogging and reported chest pressure after 2-5 minutes of exercise, resolved after 5 minutes of rest. Dr. Workman ordered a cardiac stress MRI, with the plan of proceeding to coronary angiogram if positive for ischemia. The stress test was normal and patient was notified of the results.      On his last visit with Dr. Betancourt 11/15/22, he reported persistent chest heaviness with exertion. He denies dyspnea on exertion. He walks 2 miles a day about 4 times a week.  Regularly checks his blood pressure at home, systolic BP 120s to 130s, diastolic BP 70-80's. He had previously reported fatigue and noted resting heart rates in the high 40s while he was taking atenolol 37.5 every day, and his PCP had gradually reduced the dose to 12.5 mg every day. Dr. Workman had increased his atorvastatin from 40 to 80 at bedtime after the stress test. He reported significant fatigue, dry mouth on the higher dose so he came back down to 40 mg with resolution of symptoms. He quit smoking in 2019.     He subsequently underwent stress CMR 1/26/23, which showed normal LV/RV function, no fibrosis on LGE, and no ischemia on stress perfusion imaging.     03/14/24:  Since his last visit with Dr. Betancourt, he has been feeling generally well. He has occasional left-sided shoulder/parasternal discomfort that is point-reproducible.     He has bilateral thigh and buttock pain and he is wondering if it could be related to atorvastatin.    Medications:   Atenolol 12.5 mg qd  Amlodipine 5 mg every day  Atorvastatin 40 every  "day  Metformin  every day  ASA 81 mg daily    Intolerances:  hydrochlorothiazide - hyponatremia  Atorvastatin 80 - severe dry mouth and fatigue    Past Medical History:  1. CAD, PCI of  distal LCx 10/23/2019; recurrent CP 11/15/2019 - angiogram showed patent LCx stent, 80% small OM1, distal PDA disease, medical therapy.  2. Hypertension  3. Diabetes type II      Exam:  /80 (BP Location: Right arm, Patient Position: Chair, Cuff Size: Adult Regular)   Pulse 82   Ht 1.702 m (5' 7\")   Wt 67.6 kg (149 lb)   SpO2 98%   BMI 23.34 kg/m    GENERAL APPEARANCE: healthy, alert and no distress  EYES: no icterus, no xanthelasmas  ENT: normal palate, mucosa moist, no central cyanosis  NECK: JVP not elevated  RESPIRATORY: lungs clear to auscultation - no rales, rhonchi or wheezes, no use of accessory muscles, no retractions, respirations are unlabored, normal respiratory rate  CARDIOVASCULAR: regular rhythm, normal S1 with physiologic split S2, no S3 or S4 and no murmur, click or rub.  GI: soft, non tender, bowel sounds normal,no abdominal bruits  EXTREMITIES: no edema, no bruits  NEURO: alert and oriented to person/place/time, normal speech, gait and affect  VASC: Radial, dorsalis pedis and posterior tibialis pulses 2+ bilaterally.  SKIN: no ecchymoses, no rashes.  PSYCH: cooperative, affect appropriate.     Labs:  Reviewed.       Testing/Procedures:    I personally visualized and interpreted:  Stress CMR 1/26/23: Normal LV/RV size/function/thickness, LVEF=64% RVEF=61%. No fibrosis on LGE. No ischemia on stress perfusion imaging. No change from prior study in 2021.       Assessment and Plan:   CAD s/p PCI+SATISH to LCX 2019: Stably free of angina  -continue ASA 81 mg daily  -lipid management as below    HL, controlled  Myalgias  -hold atorvastatin for 2 weeks  -contact after 2 weeks   -if myalgias improved/resolved, start PCSK9i (bempedoic acid also an option)   -if myalgias unchanged, resume atorvastatin at prior " dose    HTN, controlled  -continue present antihypertensives    Follow up in 1 year with fasting lipids prior.     The patient states understanding and is agreeable with plan.     Follow up in 1 year with fasting lipids, BMP, and EKG.    Fuentes Marin MD  Cardiology    CC

## 2024-03-14 NOTE — NURSING NOTE
"Chief Complaint   Patient presents with    Follow Up     1 year follow up (last seen 2021)       Initial /80 (BP Location: Right arm, Patient Position: Chair, Cuff Size: Adult Regular)   Pulse 82   Ht 1.702 m (5' 7\")   Wt 67.6 kg (149 lb)   SpO2 98%   BMI 23.34 kg/m   Estimated body mass index is 23.34 kg/m  as calculated from the following:    Height as of this encounter: 1.702 m (5' 7\").    Weight as of this encounter: 67.6 kg (149 lb)..  BP completed using cuff size: regular    Yulissa P., VF     "

## 2024-03-15 RX ORDER — BISACODYL 5 MG/1
TABLET, DELAYED RELEASE ORAL
Qty: 4 TABLET | Refills: 0 | Status: SHIPPED | OUTPATIENT
Start: 2024-03-15 | End: 2024-09-04

## 2024-03-15 NOTE — TELEPHONE ENCOUNTER
Attempted to contact patient in order to complete pre assessment questions.     No answer. Left message to return call to [phone] option 4    Callback required communication sent via [employer].      Margo Gonzáles RN  Endoscopy Procedure Pre Assessment RN

## 2024-03-15 NOTE — TELEPHONE ENCOUNTER
Rescheduled Procedure     Pre visit planning completed.      Procedure details:    Patient scheduled for Colonoscopy  on 3/28/24     Arrival time: 0910. Procedure time 0955    Facility location: [facility] [place]; [address], [place] [address]. Check in location: 2nd Floor at Surgery desk.    Sedation type: Conscious sedation     Pre op exam needed? N/A      Chart review:     Diabetic? Yes. Diabetic medication HOLDING recommendations: Oral diabetic medications: Yes:  Metformin (glucophage): HOLD day of procedure. Diabetic injectables: No Insulin: No      Prep for procedure:     Bowel prep recommendation: Ernestina Moss. Verify patient has bowel prep for rescheduled procedure.   Bowel prep prescription sent to [facility] [id number] [facility] - [place], [place] - [id number] [place] [address]  Due to: diabetes.     Prep instructions sent via yonny Gonzáles RN  Endoscopy Procedure Pre Assessment RN  [phone] option 4

## 2024-03-15 NOTE — TELEPHONE ENCOUNTER
Pre assessment completed for upcoming procedure.   (Please see previous telephone encounter notes for complete details)    Patient  returned call.       Procedure details:    Arrival time and facility location reviewed.    Pre op exam needed? N/A    Designated  policy reviewed. Instructed to have someone stay 6  hours post procedure.       Medication review:    Medications reviewed. Please see supporting documentation below. Holding recommendations discussed (if applicable).   Oral diabetic medication(s): Metformin (glucophage): HOLD day of procedure.      Prep for procedure:     Procedure prep instructions reviewed.  Resent prep prescription-patient did not have.       Any additional information needed:  N/A      Patient  verbalized understanding and had no questions or concerns at this time.      Margo Herring RN  Endoscopy Procedure Pre Assessment RN  [phone] option 4

## 2024-03-27 NOTE — TELEPHONE ENCOUNTER
Received incoming call from patient.    Patient wanted to confirm they were on the schedule and to confirm that they understood the prep correctly and read their understanding of the prep. Writer confirmed their understanding was correct.     Patient verbalized understanding and had no further questions.    Liz Call RN  Endoscopy Procedure Pre Assessment RN  [phone] option 4

## 2024-03-28 ENCOUNTER — HOSPITAL ENCOUNTER (OUTPATIENT)
Facility: AMBULATORY SURGERY CENTER | Age: 49
Discharge: HOME | End: 2024-03-28
Attending: INTERNAL MEDICINE | Admitting: INTERNAL MEDICINE
Payer: COMMERCIAL

## 2024-03-28 VITALS
TEMPERATURE: 97.1 F | DIASTOLIC BLOOD PRESSURE: 80 MMHG | SYSTOLIC BLOOD PRESSURE: 118 MMHG | RESPIRATION RATE: 16 BRPM | OXYGEN SATURATION: 97 % | HEART RATE: 55 BPM

## 2024-03-28 LAB — COLONOSCOPY: NORMAL

## 2024-03-28 PROCEDURE — G8907 PT DOC NO EVENTS ON DISCHARG: HCPCS

## 2024-03-28 PROCEDURE — G8918 PT W/O PREOP ORDER IV AB PRO: HCPCS

## 2024-03-28 PROCEDURE — 88305 TISSUE EXAM BY PATHOLOGIST: CPT | Performed by: PATHOLOGY

## 2024-03-28 PROCEDURE — 45380 COLONOSCOPY AND BIOPSY: CPT

## 2024-03-28 RX ORDER — NALOXONE HYDROCHLORIDE 0.4 MG/ML
0.4 INJECTION, SOLUTION INTRAMUSCULAR; INTRAVENOUS; SUBCUTANEOUS
Status: DISCONTINUED | OUTPATIENT
Start: 2024-03-28 | End: 2024-03-29 | Stop reason: HOSPADM

## 2024-03-28 RX ORDER — ONDANSETRON 4 MG/1
4 TABLET, ORALLY DISINTEGRATING ORAL EVERY 6 HOURS PRN
Status: DISCONTINUED | OUTPATIENT
Start: 2024-03-28 | End: 2024-03-29 | Stop reason: HOSPADM

## 2024-03-28 RX ORDER — FENTANYL CITRATE 50 UG/ML
INJECTION, SOLUTION INTRAMUSCULAR; INTRAVENOUS PRN
Status: DISCONTINUED | OUTPATIENT
Start: 2024-03-28 | End: 2024-03-28 | Stop reason: HOSPADM

## 2024-03-28 RX ORDER — LIDOCAINE 40 MG/G
CREAM TOPICAL
Status: DISCONTINUED | OUTPATIENT
Start: 2024-03-28 | End: 2024-03-29 | Stop reason: HOSPADM

## 2024-03-28 RX ORDER — PROCHLORPERAZINE MALEATE 10 MG
10 TABLET ORAL EVERY 6 HOURS PRN
Status: DISCONTINUED | OUTPATIENT
Start: 2024-03-28 | End: 2024-03-29 | Stop reason: HOSPADM

## 2024-03-28 RX ORDER — NALOXONE HYDROCHLORIDE 0.4 MG/ML
0.2 INJECTION, SOLUTION INTRAMUSCULAR; INTRAVENOUS; SUBCUTANEOUS
Status: DISCONTINUED | OUTPATIENT
Start: 2024-03-28 | End: 2024-03-29 | Stop reason: HOSPADM

## 2024-03-28 RX ORDER — FLUMAZENIL 0.1 MG/ML
0.2 INJECTION, SOLUTION INTRAVENOUS
Status: ACTIVE | OUTPATIENT
Start: 2024-03-28 | End: 2024-03-28

## 2024-03-28 RX ORDER — ONDANSETRON 2 MG/ML
4 INJECTION INTRAMUSCULAR; INTRAVENOUS EVERY 6 HOURS PRN
Status: DISCONTINUED | OUTPATIENT
Start: 2024-03-28 | End: 2024-03-29 | Stop reason: HOSPADM

## 2024-03-28 RX ORDER — ONDANSETRON 2 MG/ML
4 INJECTION INTRAMUSCULAR; INTRAVENOUS
Status: DISCONTINUED | OUTPATIENT
Start: 2024-03-28 | End: 2024-03-29 | Stop reason: HOSPADM

## 2024-03-28 NOTE — H&P
[facility] Anesthesia Pre-op History and Physical    Micheal Husain MRN# [id number]   Age: 48 year old YOB: 1975            Date of Exam 3/28/2024         Primary care provider: No Ref-Primary, Physician         Chief Complaint and/or Reason for Procedure:     CRC screening - first colonoscopy         Active problem list:     Patient Active Problem List    Diagnosis Date Noted    Acute pain of right shoulder 10/03/2023     Priority: Medium    Insomnia, unspecified type 12/30/2022     Priority: Medium     maintenance insomnia, failed zolpidem      Gastroesophageal reflux disease without esophagitis 12/30/2022     Priority: Medium    Hypertensive kidney disease, stage 1 10/23/2019     Priority: Medium    Status post coronary artery stent placement 10/23/2019     Priority: Medium    Coronary artery disease involving native coronary artery of native heart without angina pectoris 09/30/2019     Priority: Medium     S/p PTCA-stent 10/23/19      Essential hypertension with goal blood pressure less than 140/90 05/23/2018     Priority: Medium    Family history of ischemic heart disease 02/28/2018     Priority: Medium    Hyperlipidemia LDL goal <70 02/28/2018     Priority: Medium    Prediabetes 07/28/2017     Priority: Medium            Medications (include herbals and vitamins):   Any Plavix use in the last 7 days? No     Current Outpatient Medications   Medication Sig    aspirin (ASPIRIN LOW DOSE) 81 MG EC tablet Take 1 tablet (81 mg) by mouth daily    metFORMIN (GLUCOPHAGE XR) 500 MG 24 hr tablet Take 1 tablet (500 mg) by mouth daily (with dinner) for diabetes.    amLODIPine (NORVASC) 10 MG tablet Take 1 tablet (10 mg) by mouth daily for blood pressure    atorvastatin (LIPITOR) 80 MG tablet Take 1 tablet (80 mg) by mouth daily for cholesterol.    bisacodyl (DULCOLAX) 5 MG EC tablet Take 2 tablets at 3 pm the day before your procedure. If your procedure is before 11 am, take 2 additional tablets at  11 pm. If your procedure is after 11 am, take 2 additional tablets at 6 am. For additional instructions refer to your colonoscopy prep instructions.    bisacodyl (DULCOLAX) 5 MG EC tablet Take 2 tablets at 3 pm the day before your procedure. If your procedure is before 11 am, take 2 additional tablets at 11 pm. If your procedure is after 11 am, take 2 additional tablets at 6 am. For additional instructions refer to your colonoscopy prep instructions. (Patient not taking: Reported on 3/14/2024)    polyethylene glycol (GOLYTELY) 236 g suspension The night before the exam at 6 pm drink an 8-ounce glass every 15 minutes until the jug is half empty. If you arrive before 11 AM: Drink the other half of the Golytely jug at 11 PM night before procedure. If you arrive after 11 AM: Drink the other half of the Golytely jug at 6 AM day of procedure. For additional instructions refer to your colonoscopy prep instructions.    polyethylene glycol (GOLYTELY) 236 g suspension The night before the exam at 6 pm drink an 8-ounce glass every 15 minutes until the jug is half empty. If you arrive before 11 AM: Drink the other half of the Golytely jug at 11 PM night before procedure. If you arrive after 11 AM: Drink the other half of the Golytely jug at 6 AM day of procedure. For additional instructions refer to your colonoscopy prep instructions. (Patient not taking: Reported on 3/14/2024)     Current Facility-Administered Medications   Medication    lidocaine (LMX4) kit    lidocaine 1 % 0.1-1 mL    ondansetron (ZOFRAN) injection 4 mg    sodium chloride (PF) 0.9% PF flush 3 mL    sodium chloride (PF) 0.9% PF flush 3 mL             Allergies:      Allergies   Allergen Reactions    Hydrochlorothiazide Other (See Comments) and Fatigue     hyponatremia    Metoprolol Other (See Comments)     ED     Allergy to Latex? No  Allergy to tape?   No  Intolerances:             Physical Exam:   All vitals have been reviewedPatient Vitals for the past 8  hrs:   BP Resp SpO2   03/28/24 0915 131/79 16 98 %     No intake/output data recorded.  Lungs:   No increased work of breathing, good air exchange, clear to auscultation bilaterally, no crackles or wheezing     Cardiovascular:   normal S1 and S2             Lab / Radiology Results:            Anesthetic risk and/or ASA classification:   2    Elidia Michelle DO

## 2024-04-01 ENCOUNTER — TELEPHONE (OUTPATIENT)
Dept: CARDIOLOGY | Facility: CLINIC | Age: 49
End: 2024-04-01
Payer: COMMERCIAL

## 2024-04-01 ENCOUNTER — MYC MEDICAL ADVICE (OUTPATIENT)
Dept: CARDIOLOGY | Facility: CLINIC | Age: 49
End: 2024-04-01
Payer: COMMERCIAL

## 2024-04-01 LAB
PATH REPORT.COMMENTS IMP SPEC: NORMAL
PATH REPORT.COMMENTS IMP SPEC: NORMAL
PATH REPORT.FINAL DX SPEC: NORMAL
PATH REPORT.GROSS SPEC: NORMAL
PATH REPORT.MICROSCOPIC SPEC OTHER STN: NORMAL
PATH REPORT.RELEVANT HX SPEC: NORMAL
PHOTO IMAGE: NORMAL

## 2024-04-01 NOTE — TELEPHONE ENCOUNTER
RN return call to patient,     Patient saw Dr. Marin on 3/14/24. He was to hold atorvastatin for 2 weeks as he has been having muscle aches/pains. Plan was for 2 week RN check in. Plan is:    - If his muscle aches improve when holding his atorvastatin, Dr. Marin wants him to start PCSK9 inhibitor    - If his muscle aches do not improve then he is to resume atorvastatin at prior dose     Pt phoned, and RN reached VM. Pt requested in detailed VM to return call or respond via yonny Ferraro RN

## 2024-04-01 NOTE — TELEPHONE ENCOUNTER
[facility]    Phone Message    May a detailed message be left on voicemail: yes     Reason for Call: Other: Patient called stating he needs to update a medication, please call pt back for further discussion.     Action Taken: Other: cardiology    Travel Screening: Not Applicable    Thank you!  [facility]

## 2024-04-03 ENCOUNTER — TELEPHONE (OUTPATIENT)
Dept: CARDIOLOGY | Facility: CLINIC | Age: 49
End: 2024-04-03
Payer: COMMERCIAL

## 2024-04-03 NOTE — TELEPHONE ENCOUNTER
30 days supply test claims requested for the drugs below:  Repatha 140mg/ml, Praluent 75mg/ml    Repatha  PA needed, preferred            Praluent  Non-preferred, must try Repatha first

## 2024-04-11 NOTE — TELEPHONE ENCOUNTER
Per Dr Marin    Pt okay to continue statin 80 mg daily if he does not think the cause of the cramping.   IF he decides in the future that he would not like to continue, we would have him stop the statin and repeat lipid panel until LDL greater than 70. At which time he would be a candidate for pcsk9i    Pt agreeable to plan and will continue statin at this time and follow up as planned    Kimberlyn Ferraro RN

## 2024-04-24 NOTE — PROGRESS NOTES
History of Present Illness - Micheal Husain is a very pleasant 48 year old male here to see me for the first time for chronic nasal obstruction.  He was seen by Dr Don previously, but that was for chronic sore throats.    He tells me that for many years he has noted that the RIGHT side of the nose is always obstructed. He thinks that there might be some curvature.  There is no history of any trauma or sports injury, no previous ENT surgery.  There is no seasonal cycle to it, it is congested all year.      Past Medical History -   Patient Active Problem List   Diagnosis    Family history of ischemic heart disease    Hyperlipidemia LDL goal <70    Prediabetes    Essential hypertension with goal blood pressure less than 140/90    Coronary artery disease involving native coronary artery of native heart without angina pectoris    Hypertensive kidney disease, stage 1    Status post coronary artery stent placement    Insomnia, unspecified type    Gastroesophageal reflux disease without esophagitis    Acute pain of right shoulder       Current Medications -   Current Outpatient Medications:     amLODIPine (NORVASC) 10 MG tablet, Take 1 tablet (10 mg) by mouth daily for blood pressure, Disp: 90 tablet, Rfl: 3    aspirin (ASPIRIN LOW DOSE) 81 MG EC tablet, Take 1 tablet (81 mg) by mouth daily, Disp: 90 tablet, Rfl: 3    atorvastatin (LIPITOR) 80 MG tablet, Take 1 tablet (80 mg) by mouth daily for cholesterol., Disp: 90 tablet, Rfl: 3    bisacodyl (DULCOLAX) 5 MG EC tablet, Take 2 tablets at 3 pm the day before your procedure. If your procedure is before 11 am, take 2 additional tablets at 11 pm. If your procedure is after 11 am, take 2 additional tablets at 6 am. For additional instructions refer to your colonoscopy prep instructions., Disp: 4 tablet, Rfl: 0    bisacodyl (DULCOLAX) 5 MG EC tablet, Take 2 tablets at 3 pm the day before your procedure. If your procedure is before 11 am, take 2 additional tablets at 11 pm.  If your procedure is after 11 am, take 2 additional tablets at 6 am. For additional instructions refer to your colonoscopy prep instructions. (Patient not taking: Reported on 3/14/2024), Disp: 4 tablet, Rfl: 0    metFORMIN (GLUCOPHAGE XR) 500 MG 24 hr tablet, Take 1 tablet (500 mg) by mouth daily (with dinner) for diabetes., Disp: 90 tablet, Rfl: 3    polyethylene glycol (GOLYTELY) 236 g suspension, The night before the exam at 6 pm drink an 8-ounce glass every 15 minutes until the jug is half empty. If you arrive before 11 AM: Drink the other half of the Golytely jug at 11 PM night before procedure. If you arrive after 11 AM: Drink the other half of the Golytely jug at 6 AM day of procedure. For additional instructions refer to your colonoscopy prep instructions., Disp: 4000 mL, Rfl: 0    polyethylene glycol (GOLYTELY) 236 g suspension, The night before the exam at 6 pm drink an 8-ounce glass every 15 minutes until the jug is half empty. If you arrive before 11 AM: Drink the other half of the Golytely jug at 11 PM night before procedure. If you arrive after 11 AM: Drink the other half of the Golytely jug at 6 AM day of procedure. For additional instructions refer to your colonoscopy prep instructions. (Patient not taking: Reported on 3/14/2024), Disp: 4000 mL, Rfl: 0    Allergies -   Allergies   Allergen Reactions    Hydrochlorothiazide Other (See Comments) and Fatigue     hyponatremia    Metoprolol Other (See Comments)     ED       Social History -   Social History     Socioeconomic History    Marital status:     Number of children: 2   Occupational History    Occupation: IT   Tobacco Use    Smoking status: Former     Current packs/day: 0.20     Average packs/day: 0.2 packs/day for 8.0 years (1.6 ttl pk-yrs)     Types: Cigarettes    Smokeless tobacco: Former     Quit date: 10/1/2019    Tobacco comments:     smoked less than 10 years, 3-5 cigarettes/day   Vaping Use    Vaping status: Never Used   Substance  "and Sexual Activity    Alcohol use: Never    Drug use: Never    Sexual activity: Yes     Social Determinants of Health     Interpersonal Safety: Low Risk  (2024)    Interpersonal Safety     Do you feel physically and emotionally safe where you currently live?: Yes     Within the past 12 months, have you been hit, slapped, kicked or otherwise physically hurt by someone?: No     Within the past 12 months, have you been humiliated or emotionally abused in other ways by your partner or ex-partner?: No       Family History -   Family History   Problem Relation Age of Onset    Kidney failure Mother             Cataracts Mother     Cerebrovascular Disease Father     Heart Disease Father             Cataracts Father     Breast Cancer Sister     Coronary Stenting Brother 47    Coronary Stenting Brother 47    Diabetes No family hx of     Colon Cancer No family hx of     Prostate Cancer No family hx of        Review of Systems - As per HPI and PMHx, otherwise 10+ system review of the head and neck, and general constitution is negative.    Physical Exam  /68   Pulse 79   Resp 16   Ht 1.702 m (5' 7\")   Wt 67.6 kg (149 lb)   SpO2 98%   BMI 23.34 kg/m      General - The patient is well nourished and well developed, and appears to have good nutritional status.  Alert and oriented to person and place, answers questions and cooperates with examination appropriately.   Head and Face - Normocephalic and atraumatic, with no gross asymmetry noted of the contour of the facial features.  The facial nerve is intact, with strong symmetric movements.  Voice and Breathing - The patient was breathing comfortably without the use of accessory muscles. There was no wheezing, stridor, or stertor.  The patients voice was clear and strong, and had appropriate pitch and quality.  Ears - The tympanic membranes are normal in appearance, bony landmarks are intact.  No retraction, perforation, or masses.  No fluid or purulence was " seen in the external canal or the middle ear. No evidence of infection of the middle ear or external canal, cerumen was normal in appearance.  Eyes - Extraocular movements intact, and the pupils were reactive to light.  Sclera were not icteric or injected, conjunctiva were pink and moist.  Nose - External contour is symmetric, no gross deflection or scars.  Nasal mucosa is pink and moist with no abnormal mucus.  The septum was severely deflected and telescoping off the spine towards the RIGHT side, almost completely obstructing the RIGHT nasal airway        A/P - Micheal Husain is a 48 year old male  (J34.89) Nasal obstruction  (primary encounter diagnosis)  (J34.2) Deviated nasal septum    Based on the physical exam and history, my recommendation is for endoscopic septoplasty with or without turbinate reduction.  I counseled the patient on the risks of surgery, including infection, bleeding, risks of general anesthesia, the risk of failure of the surgery to relieve nasal obstruction, and the possibility of alteration of the appearance of the external nose.  They understood and wished to proceed to scheduling.

## 2024-05-02 ENCOUNTER — OFFICE VISIT (OUTPATIENT)
Dept: OTOLARYNGOLOGY | Facility: CLINIC | Age: 49
End: 2024-05-02
Payer: COMMERCIAL

## 2024-05-02 VITALS
HEIGHT: 67 IN | OXYGEN SATURATION: 98 % | HEART RATE: 79 BPM | WEIGHT: 149 LBS | SYSTOLIC BLOOD PRESSURE: 139 MMHG | RESPIRATION RATE: 16 BRPM | BODY MASS INDEX: 23.39 KG/M2 | DIASTOLIC BLOOD PRESSURE: 68 MMHG

## 2024-05-02 DIAGNOSIS — J34.2 DEVIATED NASAL SEPTUM: ICD-10-CM

## 2024-05-02 DIAGNOSIS — J34.89 NASAL OBSTRUCTION: Primary | ICD-10-CM

## 2024-05-02 PROCEDURE — 99204 OFFICE O/P NEW MOD 45 MIN: CPT | Mod: 25 | Performed by: OTOLARYNGOLOGY

## 2024-05-02 PROCEDURE — 31231 NASAL ENDOSCOPY DX: CPT | Performed by: OTOLARYNGOLOGY

## 2024-05-02 ASSESSMENT — PAIN SCALES - GENERAL: PAINLEVEL: NO PAIN (0)

## 2024-05-02 NOTE — LETTER
5/2/2024         RE: Micheal Husain  [address]  [facility] [address]        Dear Colleague,    Thank you for referring your patient, Micheal Husain, to the [facility] [place]. Please see a copy of my visit note below.    History of Present Illness - Micheal Husain is a very pleasant 48 year old male here to see me for the first time for chronic nasal obstruction.  He was seen by Dr Don previously, but that was for chronic sore throats.    He tells me that for many years he has noted that the RIGHT side of the nose is always obstructed. He thinks that there might be some curvature.  There is no history of any trauma or sports injury, no previous ENT surgery.  There is no seasonal cycle to it, it is congested all year.      Past Medical History -   Patient Active Problem List   Diagnosis     Family history of ischemic heart disease     Hyperlipidemia LDL goal <70     Prediabetes     Essential hypertension with goal blood pressure less than 140/90     Coronary artery disease involving native coronary artery of native heart without angina pectoris     Hypertensive kidney disease, stage 1     Status post coronary artery stent placement     Insomnia, unspecified type     Gastroesophageal reflux disease without esophagitis     Acute pain of right shoulder       Current Medications -   Current Outpatient Medications:      amLODIPine (NORVASC) 10 MG tablet, Take 1 tablet (10 mg) by mouth daily for blood pressure, Disp: 90 tablet, Rfl: 3     aspirin (ASPIRIN LOW DOSE) 81 MG EC tablet, Take 1 tablet (81 mg) by mouth daily, Disp: 90 tablet, Rfl: 3     atorvastatin (LIPITOR) 80 MG tablet, Take 1 tablet (80 mg) by mouth daily for cholesterol., Disp: 90 tablet, Rfl: 3     bisacodyl (DULCOLAX) 5 MG EC tablet, Take 2 tablets at 3 pm the day before your procedure. If your procedure is before 11 am, take 2 additional tablets at 11 pm. If your procedure is after 11 am, take 2 additional tablets at 6  am. For additional instructions refer to your colonoscopy prep instructions., Disp: 4 tablet, Rfl: 0     bisacodyl (DULCOLAX) 5 MG EC tablet, Take 2 tablets at 3 pm the day before your procedure. If your procedure is before 11 am, take 2 additional tablets at 11 pm. If your procedure is after 11 am, take 2 additional tablets at 6 am. For additional instructions refer to your colonoscopy prep instructions. (Patient not taking: Reported on 3/14/2024), Disp: 4 tablet, Rfl: 0     metFORMIN (GLUCOPHAGE XR) 500 MG 24 hr tablet, Take 1 tablet (500 mg) by mouth daily (with dinner) for diabetes., Disp: 90 tablet, Rfl: 3     polyethylene glycol (GOLYTELY) 236 g suspension, The night before the exam at 6 pm drink an 8-ounce glass every 15 minutes until the jug is half empty. If you arrive before 11 AM: Drink the other half of the Golytely jug at 11 PM night before procedure. If you arrive after 11 AM: Drink the other half of the Golytely jug at 6 AM day of procedure. For additional instructions refer to your colonoscopy prep instructions., Disp: 4000 mL, Rfl: 0     polyethylene glycol (GOLYTELY) 236 g suspension, The night before the exam at 6 pm drink an 8-ounce glass every 15 minutes until the jug is half empty. If you arrive before 11 AM: Drink the other half of the Golytely jug at 11 PM night before procedure. If you arrive after 11 AM: Drink the other half of the Golytely jug at 6 AM day of procedure. For additional instructions refer to your colonoscopy prep instructions. (Patient not taking: Reported on 3/14/2024), Disp: 4000 mL, Rfl: 0    Allergies -   Allergies   Allergen Reactions     Hydrochlorothiazide Other (See Comments) and Fatigue     hyponatremia     Metoprolol Other (See Comments)     ED       Social History -   Social History     Socioeconomic History     Marital status:      Number of children: 2   Occupational History     Occupation: IT   Tobacco Use     Smoking status: Former     Current packs/day:  "0.20     Average packs/day: 0.2 packs/day for 8.0 years (1.6 ttl pk-yrs)     Types: Cigarettes     Smokeless tobacco: Former     Quit date: 10/1/2019     Tobacco comments:     smoked less than 10 years, 3-5 cigarettes/day   Vaping Use     Vaping status: Never Used   Substance and Sexual Activity     Alcohol use: Never     Drug use: Never     Sexual activity: Yes     Social Determinants of Health     Interpersonal Safety: Low Risk  (2024)    Interpersonal Safety      Do you feel physically and emotionally safe where you currently live?: Yes      Within the past 12 months, have you been hit, slapped, kicked or otherwise physically hurt by someone?: No      Within the past 12 months, have you been humiliated or emotionally abused in other ways by your partner or ex-partner?: No       Family History -   Family History   Problem Relation Age of Onset     Kidney failure Mother              Cataracts Mother      Cerebrovascular Disease Father      Heart Disease Father              Cataracts Father      Breast Cancer Sister      Coronary Stenting Brother 47     Coronary Stenting Brother 47     Diabetes No family hx of      Colon Cancer No family hx of      Prostate Cancer No family hx of        Review of Systems - As per HPI and PMHx, otherwise 10+ system review of the head and neck, and general constitution is negative.    Physical Exam  /68   Pulse 79   Resp 16   Ht 1.702 m (5' 7\")   Wt 67.6 kg (149 lb)   SpO2 98%   BMI 23.34 kg/m      General - The patient is well nourished and well developed, and appears to have good nutritional status.  Alert and oriented to person and place, answers questions and cooperates with examination appropriately.   Head and Face - Normocephalic and atraumatic, with no gross asymmetry noted of the contour of the facial features.  The facial nerve is intact, with strong symmetric movements.  Voice and Breathing - The patient was breathing comfortably without the use of " accessory muscles. There was no wheezing, stridor, or stertor.  The patients voice was clear and strong, and had appropriate pitch and quality.  Ears - The tympanic membranes are normal in appearance, bony landmarks are intact.  No retraction, perforation, or masses.  No fluid or purulence was seen in the external canal or the middle ear. No evidence of infection of the middle ear or external canal, cerumen was normal in appearance.  Eyes - Extraocular movements intact, and the pupils were reactive to light.  Sclera were not icteric or injected, conjunctiva were pink and moist.  Nose - External contour is symmetric, no gross deflection or scars.  Nasal mucosa is pink and moist with no abnormal mucus.  The septum was severely deflected and telescoping off the spine towards the RIGHT side, almost completely obstructing the RIGHT nasal airway        A/P - Micheal Husain is a 48 year old male  (J34.89) Nasal obstruction  (primary encounter diagnosis)  (J34.2) Deviated nasal septum    Based on the physical exam and history, my recommendation is for endoscopic septoplasty with or without turbinate reduction.  I counseled the patient on the risks of surgery, including infection, bleeding, risks of general anesthesia, the risk of failure of the surgery to relieve nasal obstruction, and the possibility of alteration of the appearance of the external nose.  They understood and wished to proceed to scheduling.        Again, thank you for allowing me to participate in the care of your patient.        Sincerely,        Maury Diallo MD

## 2024-05-03 ENCOUNTER — TELEPHONE (OUTPATIENT)
Dept: OTOLARYNGOLOGY | Facility: CLINIC | Age: 49
End: 2024-05-03

## 2024-06-04 ENCOUNTER — HOSPITAL ENCOUNTER (OUTPATIENT)
Facility: AMBULATORY SURGERY CENTER | Age: 49
End: 2024-06-04
Attending: OTOLARYNGOLOGY | Admitting: OTOLARYNGOLOGY
Payer: COMMERCIAL

## 2024-06-04 PROBLEM — J34.89 NASAL OBSTRUCTION: Status: ACTIVE | Noted: 2024-05-02

## 2024-06-04 PROBLEM — J34.2 DEVIATED NASAL SEPTUM: Status: ACTIVE | Noted: 2024-05-02

## 2024-06-04 NOTE — TELEPHONE ENCOUNTER
Type of surgery: ENDOSCOPIC SEPTOPLASTY, NOSE, WITH TURBINOPLASTY   Location of surgery: [facility]  Date and time of surgery: 09/10/2024  Surgeon: JUANY  Pre-Op Appt Date: 09/04/2024  Post-Op Appt Date: 09/16/2024   Packet sent out: Yes  Pre-cert/Authorization completed:  No  Date:

## 2024-09-03 RX ORDER — CEFAZOLIN SODIUM 2 G/50ML
2 SOLUTION INTRAVENOUS
Status: CANCELLED | OUTPATIENT
Start: 2024-09-03

## 2024-09-03 RX ORDER — DEXAMETHASONE SODIUM PHOSPHATE 4 MG/ML
10 INJECTION, SOLUTION INTRA-ARTICULAR; INTRALESIONAL; INTRAMUSCULAR; INTRAVENOUS; SOFT TISSUE ONCE
Status: CANCELLED | OUTPATIENT
Start: 2024-09-03 | End: 2024-09-03

## 2024-09-03 RX ORDER — CEFAZOLIN SODIUM 2 G/50ML
2 SOLUTION INTRAVENOUS SEE ADMIN INSTRUCTIONS
Status: CANCELLED | OUTPATIENT
Start: 2024-09-03

## 2024-09-04 ENCOUNTER — OFFICE VISIT (OUTPATIENT)
Dept: FAMILY MEDICINE | Facility: CLINIC | Age: 49
End: 2024-09-04
Payer: COMMERCIAL

## 2024-09-04 VITALS
RESPIRATION RATE: 18 BRPM | WEIGHT: 152 LBS | HEIGHT: 67 IN | BODY MASS INDEX: 23.86 KG/M2 | SYSTOLIC BLOOD PRESSURE: 121 MMHG | OXYGEN SATURATION: 100 % | DIASTOLIC BLOOD PRESSURE: 69 MMHG | HEART RATE: 62 BPM | TEMPERATURE: 98.3 F

## 2024-09-04 DIAGNOSIS — K21.9 GASTROESOPHAGEAL REFLUX DISEASE WITHOUT ESOPHAGITIS: ICD-10-CM

## 2024-09-04 DIAGNOSIS — Z95.5 STATUS POST CORONARY ARTERY STENT PLACEMENT: ICD-10-CM

## 2024-09-04 DIAGNOSIS — Z01.818 PREOP GENERAL PHYSICAL EXAM: Primary | ICD-10-CM

## 2024-09-04 DIAGNOSIS — I10 ESSENTIAL HYPERTENSION WITH GOAL BLOOD PRESSURE LESS THAN 140/90: ICD-10-CM

## 2024-09-04 DIAGNOSIS — E78.5 HYPERLIPIDEMIA LDL GOAL <70: ICD-10-CM

## 2024-09-04 DIAGNOSIS — R73.03 PREDIABETES: ICD-10-CM

## 2024-09-04 DIAGNOSIS — I25.10 CORONARY ARTERY DISEASE INVOLVING NATIVE CORONARY ARTERY OF NATIVE HEART WITHOUT ANGINA PECTORIS: ICD-10-CM

## 2024-09-04 DIAGNOSIS — J34.2 DEVIATED NASAL SEPTUM: ICD-10-CM

## 2024-09-04 DIAGNOSIS — J34.89 NASAL OBSTRUCTION: ICD-10-CM

## 2024-09-04 DIAGNOSIS — Z23 NEED FOR VACCINATION: ICD-10-CM

## 2024-09-04 PROBLEM — Z86.0101 HISTORY OF ADENOMATOUS POLYP OF COLON: Status: ACTIVE | Noted: 2024-09-04

## 2024-09-04 LAB — HBA1C MFR BLD: 6 % (ref 0–5.6)

## 2024-09-04 PROCEDURE — 36415 COLL VENOUS BLD VENIPUNCTURE: CPT | Performed by: FAMILY MEDICINE

## 2024-09-04 PROCEDURE — 90472 IMMUNIZATION ADMIN EACH ADD: CPT | Performed by: FAMILY MEDICINE

## 2024-09-04 PROCEDURE — 99214 OFFICE O/P EST MOD 30 MIN: CPT | Mod: 25 | Performed by: FAMILY MEDICINE

## 2024-09-04 PROCEDURE — 90471 IMMUNIZATION ADMIN: CPT | Performed by: FAMILY MEDICINE

## 2024-09-04 PROCEDURE — 84132 ASSAY OF SERUM POTASSIUM: CPT | Performed by: FAMILY MEDICINE

## 2024-09-04 PROCEDURE — 93000 ELECTROCARDIOGRAM COMPLETE: CPT | Performed by: FAMILY MEDICINE

## 2024-09-04 PROCEDURE — 90656 IIV3 VACC NO PRSV 0.5 ML IM: CPT | Performed by: FAMILY MEDICINE

## 2024-09-04 PROCEDURE — 82565 ASSAY OF CREATININE: CPT | Performed by: FAMILY MEDICINE

## 2024-09-04 PROCEDURE — 90746 HEPB VACCINE 3 DOSE ADULT IM: CPT | Performed by: FAMILY MEDICINE

## 2024-09-04 PROCEDURE — 83036 HEMOGLOBIN GLYCOSYLATED A1C: CPT | Performed by: FAMILY MEDICINE

## 2024-09-04 PROCEDURE — 90715 TDAP VACCINE 7 YRS/> IM: CPT | Performed by: FAMILY MEDICINE

## 2024-09-04 PROCEDURE — 80061 LIPID PANEL: CPT | Performed by: FAMILY MEDICINE

## 2024-09-04 PROCEDURE — 84460 ALANINE AMINO (ALT) (SGPT): CPT | Performed by: FAMILY MEDICINE

## 2024-09-04 ASSESSMENT — PAIN SCALES - GENERAL: PAINLEVEL: NO PAIN (0)

## 2024-09-04 NOTE — PATIENT INSTRUCTIONS
At [facility], we strive to deliver an exceptional experience to you, every time we see you. If you receive a survey, please let us know what we are doing well and/or what we could improve upon, as we do value your feedback.  If you have MyChart, you can expect to receive results automatically within 24 hours of their completion.  Your provider will send a note interpreting your results as well.   If you do not have MyChart, you should receive your results in about a week by mail.    Your care team:                            Family Medicine Internal Medicine   MD Dani Phelan, MD Lyric Salas, MD Francisco Keenan, MD Marina Suarez, PATraciC    Eduardo Vincent, MD Pediatrics   Angelina Sharma, MD Alyx Montelongo, MD Lizeth Finnegan, APRN CNP Wendy Hughes APRN CNP   Jamil Dunn, MD Liz Ruelas, MD Michelle Ledsema, CNP     Tim Heath, CNP Same-Day Provider (No follow-up visits)   JOHANNY Russell, DNP Gina Jarvis, JOHANNY Mccoy, FNP, BC DESMOND De OliveiraC     Clinic hours: Monday - Thursday 7 am-6 pm; Fridays 7 am-5 pm.   Urgent care: Monday - Friday 10 am- 8 pm; Saturday and Sunday 9 am-5 pm.    Clinic: [phone]       [facility]: Monday - Thursday 8 am - 7 pm; Friday 8 am - 6 pm  [facility]: [phone]     How to Take Your Medication Before Surgery  Preoperative Medication Instructions   Antiplatelet or Anticoagulation Medication Instructions   - aspirin: Discontinue aspirin 7-10 days prior to procedure to reduce bleeding risk. It should be resumed postoperatively.     Additional Medication Instructions  none       Patient Education   Preparing for Your Surgery  Getting started  A nurse will call you to review your health history and instructions. They will give you an arrival time based on your scheduled surgery time. Please be ready to share:  Your  doctor's clinic name and phone number  Your medical, surgical, and anesthesia history  A list of allergies and sensitivities  A list of medicines, including herbal treatments and over-the-counter drugs  Whether the patient has a legal guardian (ask how to send us the papers in advance)  Please tell us if you're pregnant--or if there's any chance you might be pregnant. Some surgeries may injure a fetus (unborn baby), so they require a pregnancy test. Surgeries that are safe for a fetus don't always need a test, and you can choose whether to have one.   If you have a child who's having surgery, please ask for a copy of Preparing for Your Child's Surgery.    Preparing for surgery  Within 10 to 30 days of surgery: Have a pre-op exam (sometimes called an H&P, or History and Physical). This can be done at a clinic or pre-operative center.  If you're having a , you may not need this exam. Talk to your care team.  At your pre-op exam, talk to your care team about all medicines you take. If you need to stop any medicines before surgery, ask when to start taking them again.  We do this for your safety. Many medicines can make you bleed too much during surgery. Some change how well surgery (anesthesia) drugs work.  Call your insurance company to let them know you're having surgery. (If you don't have insurance, call [phone].)  Call your clinic if there's any change in your health. This includes signs of a cold or flu (sore throat, runny nose, cough, rash, fever). It also includes a scrape or scratch near the surgery site.  If you have questions on the day of surgery, call your hospital or surgery center.  Eating and drinking guidelines  For your safety: Unless your surgeon tells you otherwise, follow the guidelines below.  Eat and drink as usual until 8 hours before you arrive for surgery. After that, no food or milk.  Drink clear liquids until 2 hours before you arrive. These are liquids you can see through,  like water, Gatorade, and Propel Water. They also include plain black coffee and tea (no cream or milk), candy, and breath mints. You can spit out gum when you arrive.  If you drink alcohol: Stop drinking it the night before surgery.  If your care team tells you to take medicine on the morning of surgery, it's okay to take it with a sip of water.  Preventing infection  Shower or bathe the night before and morning of your surgery. Follow the instructions your clinic gave you. (If no instructions, use regular soap.)  Don't shave or clip hair near your surgery site. We'll remove the hair if needed.  Don't smoke or vape the morning of surgery. You may chew nicotine gum up to 2 hours before surgery. A nicotine patch is okay.  Note: Some surgeries require you to completely quit smoking and nicotine. Check with your surgeon.  Your care team will make every effort to keep you safe from infection. We will:  Clean our hands often with soap and water (or an alcohol-based hand rub).  Clean the skin at your surgery site with a special soap that kills germs.  Give you a special gown to keep you warm. (Cold raises the risk of infection.)  Wear special hair covers, masks, gowns and gloves during surgery.  Give antibiotic medicine, if prescribed. Not all surgeries need antibiotics.  What to bring on the day of surgery  Photo ID and insurance card  Copy of your health care directive, if you have one  Glasses and hearing aids (bring cases)  You can't wear contacts during surgery  Inhaler and eye drops, if you use them (tell us about these when you arrive)  CPAP machine or breathing device, if you use them  A few personal items, if spending the night  If you have . . .  A pacemaker, ICD (cardiac defibrillator) or other implant: Bring the ID card.  An implanted stimulator: Bring the remote control.  A legal guardian: Bring a copy of the certified (court-stamped) guardianship papers.  Please remove any jewelry, including body piercings.  Leave jewelry and other valuables at home.  If you're going home the day of surgery  You must have a responsible adult drive you home. They should stay with you overnight as well.  If you don't have someone to stay with you, and you aren't safe to go home alone, we may keep you overnight. Insurance often won't pay for this.  After surgery  If it's hard to control your pain or you need more pain medicine, please call your surgeon's office.  Questions?   If you have any questions for your care team, list them here: _________________________________________________________________________________________________________________________________________________________________________ ____________________________________ ____________________________________ ____________________________________  For informational purposes only. Not to replace the advice of your health care provider. Copyright   2003, 2019 [employer]. All rights reserved. Clinically reviewed by Megan Diehl MD. [employer]ART[employer] 850182 - REV 12/22.

## 2024-09-04 NOTE — PROGRESS NOTES
Preoperative Evaluation  [address] [address]  Phone: [phone]  Primary Provider: [facility]  Pre-op Performing Provider: Lamin Parson MD  Sep 4, 2024             9/4/2024   Surgical Information   What procedure is being done? Nasal septoplasty and turbinoplasty   Facility or Hospital where procedure/surgery will be performed: [facility]   Who is doing the procedure / surgery? Doctor   Date of surgery / procedure: 10th september   Time of surgery / procedure: 9am   Where do you plan to recover after surgery? at home with family        Fax number for surgical facility: Note does not need to be faxed, will be available electronically in Epic.    Assessment & Plan     The proposed surgical procedure is considered INTERMEDIATE risk.    (Z01.818) Preop general physical exam  (primary encounter diagnosis)  (J34.89) Nasal obstruction  (J34.2) Deviated nasal septum  Comment:   Plan: Surgery as above    (Z95.5) Status post coronary artery stent placement  (I25.10) Coronary artery disease involving native coronary artery of native heart without angina pectoris  Comment: Clinically stable  Plan: Lipid panel reflex to direct LDL Non-fasting,         ALT, EKG 12-lead complete w/read - Clinics        Follow-up with cardiology scheduled 3/13/2025    (E78.5) Hyperlipidemia LDL goal <70  Comment: He is on a high-intensity statin   Plan: Lipid panel reflex to direct LDL Non-fasting,         ALT            (I10) Essential hypertension with goal blood pressure less than 140/90  Comment: Well-controlled  Plan: Potassium, Creatinine            (R73.03) Prediabetes  Comment:   Plan: Hemoglobin A1c        Follow-up as needed    (K21.9) Gastroesophageal reflux disease without esophagitis  Comment:   Plan:     (Z23) Need for vaccination  Comment:   Plan: HEPATITIS B, ADULT 20+ (ENGERIX-B/RECOMBIVAX         HB), TDAP 10-64Y  (ADACEL,BOOSTRIX), INFLUENZA         VACCINE,SPLIT VIRUS,TRIVALENT,PF(FLUZONE)                    - No identified additional risk factors other than previously addressed    Antiplatelet or Anticoagulation Medication Instructions   - aspirin: Discontinue aspirin 7-10 days prior to procedure to reduce bleeding risk. It should be resumed postoperatively.     Additional Medication Instructions  Take all scheduled medications on the day of surgery EXCEPT for modifications listed below: aspirin    Recommendation  Approval given to proceed with proposed procedure, without further diagnostic evaluation.    Agnieszka Burton is a 49 year old, presenting for the following:  Pre-Op Exam          9/4/2024     4:20 PM   Additional Questions   Roomed by Jose Cruz   Accompanied by Self     HPI related to upcoming procedure: This 49 year old male complains of nasal airway obstruction on the right side, notable for about 2 years.         9/4/2024   Pre-Op Questionnaire   Have you ever had a heart attack or stroke? No   Have you ever had surgery on your heart or blood vessels, such as a stent placement, a coronary artery bypass, or surgery on an artery in your head, neck, heart, or legs? (!) YES coronary stenting 2019   Do you have chest pain with activity? No   Do you have a history of heart failure? No   Do you currently have a cold, bronchitis or symptoms of other infection? No   Do you have a cough, shortness of breath, or wheezing? No   Do you or anyone in your family have previous history of blood clots? No   Do you or does anyone in your family have a serious bleeding problem such as prolonged bleeding following surgeries or cuts? No   Have you ever had problems with anemia or been told to take iron pills? No   Have you had any abnormal blood loss such as black, tarry or bloody stools? No   Have you ever had a blood transfusion? No   Are you willing to have a blood transfusion if it is medically needed before, during, or after  your surgery? Yes   Have you or any of your relatives ever had problems with anesthesia? No   Do you have sleep apnea, excessive snoring or daytime drowsiness? No   Do you have any artifical heart valves or other implanted medical devices like a pacemaker, defibrillator, or continuous glucose monitor? No   Do you have artificial joints? No   Are you allergic to latex? No        Health Care Directive  Patient does not have a Health Care Directive or Living Will: Discussed advance care planning with patient; information given to patient to review.    Preoperative Review of    reviewed - no record of controlled substances prescribed.      Status of Chronic Conditions:  See problem list for active medical problems.  Problems all longstanding and stable, except as noted/documented.  See ROS for pertinent symptoms related to these conditions.    Patient Active Problem List    Diagnosis Date Noted    History of adenomatous polyp of colon 09/04/2024     Priority: Medium    Nasal obstruction 05/02/2024     Priority: Medium    Deviated nasal septum 05/02/2024     Priority: Medium    Acute pain of right shoulder 10/03/2023     Priority: Medium    Insomnia, unspecified type 12/30/2022     Priority: Medium     maintenance insomnia, failed zolpidem      Gastroesophageal reflux disease without esophagitis 12/30/2022     Priority: Medium    Hypertensive kidney disease, stage 1 10/23/2019     Priority: Medium    Status post coronary artery stent placement 10/23/2019     Priority: Medium    Coronary artery disease involving native coronary artery of native heart without angina pectoris 09/30/2019     Priority: Medium     S/p PTCA-stent 10/23/19      Essential hypertension with goal blood pressure less than 140/90 05/23/2018     Priority: Medium    Family history of ischemic heart disease 02/28/2018     Priority: Medium    Hyperlipidemia LDL goal <70 02/28/2018     Priority: Medium    Prediabetes 07/28/2017     Priority: Medium       Past Medical History:   Diagnosis Date    Hyperlipidemia     Hypertension     Pre-diabetes      Past Surgical History:   Procedure Laterality Date    CARDIAC CATHERIZATION  10/23/2019    PTCA-stent    COLONOSCOPY N/A 3/28/2024    Procedure: COLONOSCOPY, WITH POLYPECTOMY AND BIOPSY;  Surgeon: Elidia Michelle DO;  Location: [facility] OR    COLONOSCOPY WITH CO2 INSUFFLATION N/A 3/28/2024    Procedure: Colonoscopy with CO2 insufflation;  Surgeon: Elidia Michelle DO;  Location: [facility] OR     Current Outpatient Medications   Medication Sig Dispense Refill    amLODIPine (NORVASC) 10 MG tablet Take 1 tablet (10 mg) by mouth daily for blood pressure 90 tablet 3    aspirin (ASPIRIN LOW DOSE) 81 MG EC tablet Take 1 tablet (81 mg) by mouth daily 90 tablet 3    atorvastatin (LIPITOR) 80 MG tablet Take 1 tablet (80 mg) by mouth daily for cholesterol. 90 tablet 3    metFORMIN (GLUCOPHAGE XR) 500 MG 24 hr tablet Take 1 tablet (500 mg) by mouth daily (with dinner) for diabetes prevention. 90 tablet 3       Allergies   Allergen Reactions    Hydrochlorothiazide Other (See Comments) and Fatigue     hyponatremia    Metoprolol Other (See Comments)     ED        Social History     Tobacco Use    Smoking status: Former     Current packs/day: 0.20     Average packs/day: 0.2 packs/day for 8.0 years (1.6 ttl pk-yrs)     Types: Cigarettes    Smokeless tobacco: Former     Quit date: 10/1/2019    Tobacco comments:     smoked less than 10 years, 3-5 cigarettes/day   Substance Use Topics    Alcohol use: Never     Family History   Problem Relation Age of Onset    Kidney failure Mother             Cataracts Mother     Cerebrovascular Disease Father     Heart Disease Father             Cataracts Father     Breast Cancer Sister     Coronary Stenting Brother 47    Coronary Stenting Brother 47    Diabetes No family hx of     Colon Cancer No family hx of     Prostate Cancer No family hx of     Anesthesia Reaction No family hx of     Thrombophilia  "No family hx of      History   Drug Use Unknown             Review of Systems  Constitutional, HEENT, cardiovascular, pulmonary, gi and gu systems are negative, except as otherwise noted.    Objective    /69 (BP Location: Left arm, Patient Position: Chair, Cuff Size: Adult Regular)   Pulse 62   Temp 98.3  F (36.8  C) (Temporal)   Resp 18   Ht 1.702 m (5' 7\")   Wt 68.9 kg (152 lb)   SpO2 100%   BMI 23.81 kg/m     Estimated body mass index is 23.81 kg/m  as calculated from the following:    Height as of this encounter: 1.702 m (5' 7\").    Weight as of this encounter: 68.9 kg (152 lb).  Physical Exam  GENERAL: alert and no distress  EYES: Eyes grossly normal to inspection, PERRL and conjunctivae and sclerae normal  HENT: ear canals and TM's normal, nose and mouth without ulcers or lesions  NECK: no adenopathy, no asymmetry, masses, or scars  RESP: lungs clear to auscultation - no rales, rhonchi or wheezes  CV: regular rate and rhythm, normal S1 S2, no S3 or S4, no murmur, click or rub, no peripheral edema  ABDOMEN: soft, nontender, no hepatosplenomegaly, no masses and bowel sounds normal  MS: no gross musculoskeletal defects noted, no edema  SKIN: no suspicious lesions or rashes  NEURO: Normal strength and tone, mentation intact and speech normal  PSYCH: mentation appears normal, affect normal/bright    Recent Labs   Lab Test 02/01/24  0804   HGB 13.4         POTASSIUM 4.4   CR 0.90   A1C 5.9*             EKG Interpretation:      Interpreted by Lamin Parson MD    Symptoms at time of EKG: None   Rhythm: Sinus bradycardia  Rate: 50-60  Axis: Normal  Ectopy: None  Conduction: Normal  ST Segments/ T Waves: No ST-T wave changes and No acute ischemic changes  Q Waves: None  Comparison to prior: Unchanged from 1/26/2023    Clinical Impression: Mild sinus bradycardia, otherwise normal EKG    Diagnostics  Labs pending at this time.  Results will be reviewed when available.   EKG required for " known coronary heart disease and not completed in the last 90 days.     Revised Cardiac Risk Index (RCRI)  The patient has the following serious cardiovascular risks for perioperative complications:   - Coronary Artery Disease (MI, positive stress test, angina, Qs on EKG) = 1 point     RCRI Interpretation: 1 point: Class II (low risk - 0.9% complication rate)         Signed Electronically by: Lamin Parson MD  A copy of this evaluation report is provided to the requesting physician.

## 2024-09-04 NOTE — NURSING NOTE
Prior to immunization administration, verified patients identity using patient s name and date of birth. Please see Immunization Activity for additional information.     Screening Questionnaire for Adult Immunization    Are you sick today?   No   Do you have allergies to medications, food, a vaccine component or latex?   No   Have you ever had a serious reaction after receiving a vaccination?   No   Do you have a long-term health problem with heart, lung, kidney, or metabolic disease (e.g., diabetes), asthma, a blood disorder, no spleen, complement component deficiency, a cochlear implant, or a spinal fluid leak?  Are you on long-term aspirin therapy?   No   Do you have cancer, leukemia, HIV/AIDS, or any other immune system problem?   No   Do you have a parent, brother, or sister with an immune system problem?   No   In the past 3 months, have you taken medications that affect  your immune system, such as prednisone, other steroids, or anticancer drugs; drugs for the treatment of rheumatoid arthritis, Crohn s disease, or psoriasis; or have you had radiation treatments?   No   Have you had a seizure, or a brain or other nervous system problem?   No   During the past year, have you received a transfusion of blood or blood    products, or been given immune (gamma) globulin or antiviral drug?   No   For women: Are you pregnant or is there a chance you could become       pregnant during the next month?   No   Have you received any vaccinations in the past 4 weeks?   No     Immunization questionnaire answers were all negative.      Patient instructed to remain in clinic for 15 minutes afterwards, and to report any adverse reactions.     Screening performed by Patricia Osman MA on 9/4/2024 at 5:44 PM.

## 2024-09-05 ENCOUNTER — PATIENT OUTREACH (OUTPATIENT)
Dept: GASTROENTEROLOGY | Facility: CLINIC | Age: 49
End: 2024-09-05
Payer: COMMERCIAL

## 2024-09-05 LAB
ALT SERPL W P-5'-P-CCNC: 35 U/L (ref 0–70)
CHOLEST SERPL-MCNC: 142 MG/DL
CREAT SERPL-MCNC: 0.82 MG/DL (ref 0.67–1.17)
EGFRCR SERPLBLD CKD-EPI 2021: >90 ML/MIN/1.73M2
FASTING STATUS PATIENT QL REPORTED: NO
HDLC SERPL-MCNC: 40 MG/DL
LDLC SERPL CALC-MCNC: 46 MG/DL
NONHDLC SERPL-MCNC: 102 MG/DL
POTASSIUM SERPL-SCNC: 4.3 MMOL/L (ref 3.4–5.3)
TRIGL SERPL-MCNC: 278 MG/DL

## 2024-09-05 NOTE — TELEPHONE ENCOUNTER
Patient called to cancel surgery noting that he will call back next year to reschedule. Sent a message to [facility] to cancel

## 2024-09-10 ENCOUNTER — TELEPHONE (OUTPATIENT)
Dept: CARDIOLOGY | Facility: CLINIC | Age: 49
End: 2024-09-10
Payer: COMMERCIAL

## 2024-09-10 NOTE — TELEPHONE ENCOUNTER
Left Voicemail (1st Attempt) and Sent [employer] (1st Attempt) for the patient to call back and schedule the following:    Appointment type: Return Card  Provider: Dr. Marin  Return date: next available  Specialty phone number: [phone]  Additional appointment(s) needed: yes  Additonal Notes: labs a few days prior    Attempted to reschedule the appointment with Dr. Marin on 3/13/2025, Provider out of clinic.    Also sent a [employer] message.    Ksenia RIVERS/Complex Procedure    [facility]   Neurology, NeuroSurgery, NeuroPsychology, Pain Management and Cardiology Specialties  Medical/Surgical Adult Specialties

## 2024-09-12 NOTE — TELEPHONE ENCOUNTER
Left Voicemail (2nd Attempt) and Sent Mychart (2nd Attempt) for the patient to call back and schedule the following:    Appointment type: Return Card  Provider: Dr. Marin  Return date: next available  Specialty phone number: [phone]  Additional appointment(s) needed: yes  Additonal Notes:labs    2nd attempt to reschedule the appointment with Dr. Mairn on 3/13/2025.    Also sent 2nd MyChart message and appointment and reschedule letter.    Ksenia RIVERS/Tian Procedure    [facility]   Neurology, NeuroSurgery, NeuroPsychology, Pain Management and Cardiology Specialties  Medical/Surgical Adult Specialties

## 2024-12-31 ENCOUNTER — PATIENT OUTREACH (OUTPATIENT)
Dept: CARE COORDINATION | Facility: CLINIC | Age: 49
End: 2024-12-31
Payer: COMMERCIAL

## 2025-02-03 ENCOUNTER — OFFICE VISIT (OUTPATIENT)
Dept: FAMILY MEDICINE | Facility: CLINIC | Age: 50
End: 2025-02-03
Payer: COMMERCIAL

## 2025-02-03 VITALS
HEIGHT: 67 IN | TEMPERATURE: 97.4 F | SYSTOLIC BLOOD PRESSURE: 128 MMHG | DIASTOLIC BLOOD PRESSURE: 74 MMHG | BODY MASS INDEX: 23.7 KG/M2 | WEIGHT: 151 LBS | RESPIRATION RATE: 20 BRPM | OXYGEN SATURATION: 99 % | HEART RATE: 81 BPM

## 2025-02-03 DIAGNOSIS — Z00.00 ROUTINE GENERAL MEDICAL EXAMINATION AT A HEALTH CARE FACILITY: Primary | ICD-10-CM

## 2025-02-03 DIAGNOSIS — I25.10 CORONARY ARTERY DISEASE INVOLVING NATIVE CORONARY ARTERY OF NATIVE HEART WITHOUT ANGINA PECTORIS: ICD-10-CM

## 2025-02-03 DIAGNOSIS — I10 ESSENTIAL HYPERTENSION WITH GOAL BLOOD PRESSURE LESS THAN 140/90: ICD-10-CM

## 2025-02-03 DIAGNOSIS — E78.5 HYPERLIPIDEMIA LDL GOAL <70: ICD-10-CM

## 2025-02-03 DIAGNOSIS — R73.03 PREDIABETES: ICD-10-CM

## 2025-02-03 DIAGNOSIS — I12.9 HYPERTENSIVE KIDNEY DISEASE, STAGE 1: ICD-10-CM

## 2025-02-03 DIAGNOSIS — N18.1 HYPERTENSIVE KIDNEY DISEASE, STAGE 1: ICD-10-CM

## 2025-02-03 DIAGNOSIS — Z28.21 VACCINATION NOT CARRIED OUT BECAUSE OF PATIENT REFUSAL: ICD-10-CM

## 2025-02-03 PROCEDURE — 99214 OFFICE O/P EST MOD 30 MIN: CPT | Mod: 25 | Performed by: FAMILY MEDICINE

## 2025-02-03 PROCEDURE — 99396 PREV VISIT EST AGE 40-64: CPT | Performed by: FAMILY MEDICINE

## 2025-02-03 PROCEDURE — G2211 COMPLEX E/M VISIT ADD ON: HCPCS | Performed by: FAMILY MEDICINE

## 2025-02-03 RX ORDER — AMLODIPINE BESYLATE 10 MG/1
10 TABLET ORAL DAILY
Qty: 90 TABLET | Refills: 3 | Status: SHIPPED | OUTPATIENT
Start: 2025-02-03

## 2025-02-03 RX ORDER — ASPIRIN 81 MG/1
81 TABLET ORAL DAILY
Qty: 90 TABLET | Refills: 3 | Status: SHIPPED | OUTPATIENT
Start: 2025-02-03

## 2025-02-03 RX ORDER — ATORVASTATIN CALCIUM 80 MG/1
80 TABLET, FILM COATED ORAL DAILY
Qty: 90 TABLET | Refills: 3 | Status: SHIPPED | OUTPATIENT
Start: 2025-02-03

## 2025-02-03 RX ORDER — METFORMIN HYDROCHLORIDE 500 MG/1
500 TABLET, EXTENDED RELEASE ORAL
Qty: 90 TABLET | Refills: 3 | Status: SHIPPED | OUTPATIENT
Start: 2025-02-03

## 2025-02-03 SDOH — HEALTH STABILITY: PHYSICAL HEALTH: ON AVERAGE, HOW MANY MINUTES DO YOU ENGAGE IN EXERCISE AT THIS LEVEL?: 60 MIN

## 2025-02-03 SDOH — HEALTH STABILITY: PHYSICAL HEALTH: ON AVERAGE, HOW MANY DAYS PER WEEK DO YOU ENGAGE IN MODERATE TO STRENUOUS EXERCISE (LIKE A BRISK WALK)?: 3 DAYS

## 2025-02-03 ASSESSMENT — PAIN SCALES - GENERAL: PAINLEVEL_OUTOF10: NO PAIN (0)

## 2025-02-03 ASSESSMENT — SOCIAL DETERMINANTS OF HEALTH (SDOH): HOW OFTEN DO YOU GET TOGETHER WITH FRIENDS OR RELATIVES?: TWICE A WEEK

## 2025-02-03 NOTE — PATIENT INSTRUCTIONS
At [facility], we strive to deliver an exceptional experience to you, every time we see you. If you receive a survey, please let us know what we are doing well and/or what we could improve upon, as we do value your feedback.  If you have MyChart, you can expect to receive results automatically within 24 hours of their completion.  Your provider will send a note interpreting your results as well.   If you do not have MyChart, you should receive your results in about a week by mail.    Your care team:                            Family Medicine Internal Medicine   MD Dani Phelan, MD Lyric Salas, MD Francisco Keenan, MD Marina Suarez, PATraciC    Eduardo Vincent, MD Pediatrics   Angelina Sharma, MD Alyx Montelongo, MD Lizeth Finnegan, APRN CNP Wendy Hughes APRN CNP   Jamil Dunn, MD Liz Ruelas, MD Michelle Ledesma, CNP     Tim Heath, CNP Same-Day Provider (No follow-up visits)   JOHANNY Russell, DNP Gina Jarvis, PA-C   JOHANNY Alvarez, FNP, BC DESMOND De OliveiraC     Clinic hours: Monday - Thursday 7 am-6 pm; Fridays 7 am-5 pm.   Urgent care: Monday - Friday 10 am- 8 pm; Saturday and Sunday 9 am-5 pm.    Clinic: [phone]       [facility]: Monday - Thursday 8 am - 7 pm; Friday 8 am - 6 pm  [facility]: [phone]     Patient Education   Preventive Care Advice   This is general advice given by our system to help you stay healthy. However, your care team may have specific advice just for you. Please talk to your care team about your preventive care needs.  Nutrition  Eat 5 or more servings of fruits and vegetables each day.  Try wheat bread, brown rice and whole grain pasta (instead of white bread, rice, and pasta).  Get enough calcium and vitamin D. Check the label on foods and aim for 100% of the RDA (recommended daily allowance).  Lifestyle  Exercise at least 150  minutes each week  (30 minutes a day, 5 days a week).  Do muscle strengthening activities 2 days a week. These help control your weight and prevent disease.  No smoking.  Wear sunscreen to prevent skin cancer.  Have a dental exam and cleaning every 6 months.  Yearly exams  See your health care team every year to talk about:  Any changes in your health.  Any medicines your care team has prescribed.  Preventive care, family planning, and ways to prevent chronic diseases.  Shots (vaccines)   HPV shots (up to age 26), if you've never had them before.  Hepatitis B shots (up to age 59), if you've never had them before.  COVID-19 shot: Get this shot when it's due.  Flu shot: Get a flu shot every year.  Tetanus shot: Get a tetanus shot every 10 years.  Pneumococcal, hepatitis A, and RSV shots: Ask your care team if you need these based on your risk.  Shingles shot (for age 50 and up)  General health tests  Diabetes screening:  Starting at age 35, Get screened for diabetes at least every 3 years.  If you are younger than age 35, ask your care team if you should be screened for diabetes.  Cholesterol test: At age 39, start having a cholesterol test every 5 years, or more often if advised.  Bone density scan (DEXA): At age 50, ask your care team if you should have this scan for osteoporosis (brittle bones).  Hepatitis C: Get tested at least once in your life.  STIs (sexually transmitted infections)  Before age 24: Ask your care team if you should be screened for STIs.  After age 24: Get screened for STIs if you're at risk. You are at risk for STIs (including HIV) if:  You are sexually active with more than one person.  You don't use condoms every time.  You or a partner was diagnosed with a sexually transmitted infection.  If you are at risk for HIV, ask about PrEP medicine to prevent HIV.  Get tested for HIV at least once in your life, whether you are at risk for HIV or not.  Cancer screening tests  Cervical cancer screening:  If you have a cervix, begin getting regular cervical cancer screening tests starting at age 21.  Breast cancer scan (mammogram): If you've ever had breasts, begin having regular mammograms starting at age 40. This is a scan to check for breast cancer.  Colon cancer screening: It is important to start screening for colon cancer at age 45.  Have a colonoscopy test every 10 years (or more often if you're at risk) Or, ask your provider about stool tests like a FIT test every year or Cologuard test every 3 years.  To learn more about your testing options, visit:   .  For help making a decision, visit:   https://bit.ly/vz79865.  Prostate cancer screening test: If you have a prostate, ask your care team if a prostate cancer screening test (PSA) at age 55 is right for you.  Lung cancer screening: If you are a current or former smoker ages 50 to 80, ask your care team if ongoing lung cancer screenings are right for you.  For informational purposes only. Not to replace the advice of your health care provider. Copyright   2023 [employer]. All rights reserved. Clinically reviewed by the [facility]itions [facility]. [facility] [id number] - REV 01/24.

## 2025-02-03 NOTE — PROGRESS NOTES
"Preventive Care Visit  [facility]  Lamin Parson MD, Family Medicine  Feb 3, 2025      Assessment & Plan     (Z00.00) Routine general medical examination at a health care facility  (primary encounter diagnosis)  Comment: Negative screening exam; up-to-date on preventive services except COVID-19 vaccine.   Plan: Return in about 1 year (around 2/3/2026) for full physical, cholesterol, blood pressure check.      (I25.10) Coronary artery disease involving native coronary artery of native heart without angina pectoris  (E78.5) Hyperlipidemia LDL goal <70  Comment: He is on a high-intensity statin. LDL historically at goal. He is not fasting today, so he will have his lipids checked on another day, as ordered by his cardiologist.    Plan: atorvastatin (LIPITOR) 80 MG tablet, ALT        Return in about 1 year (around 2/3/2026) for full physical, cholesterol, blood pressure check.      (I10) Essential hypertension with goal blood pressure less than 140/90  Comment: prescription update. well controlled.   Plan: amLODIPine (NORVASC) 10 MG tablet       BMP pending, per cardiology.  Return in about 1 year (around 2/3/2026) for full physical, cholesterol, blood pressure check.      (I12.9,  N18.1) Hypertensive kidney disease, stage 1  Comment: unknown details.   Plan: Albumin Random Urine Quantitative with Creat         Ratio            (R73.03) Prediabetes  Comment: prescription update.  Plan: metFORMIN (GLUCOPHAGE XR) 500 MG 24 hr tablet            (Z28.21) Vaccination not carried out because of patient refusal  Comment: COVID-19   Plan: \"I will get it on another day\"    Counseling  Appropriate preventive services were addressed with this patient via screening, questionnaire, or discussion as appropriate for fall prevention, nutrition, physical activity, Tobacco-use cessation, social engagement, weight loss and cognition.  Checklist reviewing preventive services available has been given to the " patient.  Reviewed patient's diet, addressing concerns and/or questions.   He is at risk for lack of exercise and has been provided with information to increase physical activity for the benefit of his well-being.     Agnieszka Burton is a 49 year old, presenting for the following:  Physical        2/3/2025     3:19 PM   Additional Questions   Roomed by Jose Cruz   Accompanied by Self      HPI  Health Care Directive  Patient does not have a Health Care Directive: Discussed advance care planning with patient; information given to patient to review.      2/3/2025   General Health   How would you rate your overall physical health? Good   Feel stress (tense, anxious, or unable to sleep) Not at all         2/3/2025   Nutrition   Three or more servings of calcium each day? Yes   Diet: Regular (no restrictions)   How many servings of fruit and vegetables per day? (!) 2-3   How many sweetened beverages each day? 0-1         2/3/2025   Exercise   Days per week of moderate/strenous exercise 3 days   Average minutes spent exercising at this level 60 min         2/3/2025   Social Factors   Frequency of gathering with friends or relatives Twice a week   Worry food won't last until get money to buy more No   Food not last or not have enough money for food? No   Do you have housing? (Housing is defined as stable permanent housing and does not include staying ouside in a car, in a tent, in an abandoned building, in an overnight shelter, or couch-surfing.) Yes   Are you worried about losing your housing? No   Lack of transportation? No   Unable to get utilities (heat,electricity)? No         2/3/2025   Dental   Dentist two times every year? Yes         2/3/2025   TB Screening   Were you born outside of the [place]? Yes         Today's PHQ-2 Score:       2/3/2025     3:12 PM   PHQ-2 ( 1999 [employer])   Q1: Little interest or pleasure in doing things 0   Q2: Feeling down, depressed or hopeless 0   PHQ-2 Score 0    Q1: Little interest or  "pleasure in doing things Not at all   Q2: Feeling down, depressed or hopeless Not at all   PHQ-2 Score 0       Patient-reported           2/3/2025   Substance Use   Alcohol more than 3/day or more than 7/wk Not Applicable   Do you use any other substances recreationally? No     Social History     Tobacco Use    Smoking status: Former     Current packs/day: 0.20     Average packs/day: 0.2 packs/day for 8.0 years (1.6 ttl pk-yrs)     Types: Cigarettes    Smokeless tobacco: Former     Quit date: 10/1/2019    Tobacco comments:     smoked less than 10 years, 3-5 cigarettes/day   Vaping Use    Vaping status: Never Used   Substance Use Topics    Alcohol use: Yes     Comment: rare    Drug use: Never           2/3/2025   STI Screening   New sexual partner(s) since last STI/HIV test? No   ASCVD Risk   The 10-year ASCVD risk score (Manpreet DUVALL, et al., 2019) is: 2.8%    Values used to calculate the score:      Age: 49 years      Sex: Male      Is Non- : No      Diabetic: No      Tobacco smoker: No      Systolic Blood Pressure: 128 mmHg      Is BP treated: Yes      HDL Cholesterol: 40 mg/dL      Total Cholesterol: 142 mg/dL        2/3/2025   Contraception/Family Planning   Questions about contraception or family planning No        Reviewed and updated as needed this visit by Provider   Tobacco   Meds  Problems  Med Hx  Surg Hx  Fam Hx          Review of Systems     Objective    Exam  /74 (BP Location: Left arm, Patient Position: Chair, Cuff Size: Adult Large)   Pulse 81   Temp 97.4  F (36.3  C) (Temporal)   Resp 20   Ht 1.702 m (5' 7\")   Wt 68.5 kg (151 lb)   SpO2 99%   BMI 23.65 kg/m     Estimated body mass index is 23.65 kg/m  as calculated from the following:    Height as of this encounter: 1.702 m (5' 7\").    Weight as of this encounter: 68.5 kg (151 lb).    Physical Exam  GENERAL: alert and no distress  EYES: Eyes grossly normal to inspection, PERRL and conjunctivae and " sclerae normal  HENT: ear canals and TM's normal, nose and mouth without ulcers or lesions  NECK: no adenopathy, no asymmetry, masses, or scars  RESP: lungs clear to auscultation - no rales, rhonchi or wheezes  CV: regular rate and rhythm, normal S1 S2, no S3 or S4, no murmur, click or rub, no peripheral edema  ABDOMEN: soft, nontender, no hepatosplenomegaly, no masses and bowel sounds normal   (male): normal male genitalia without lesions or urethral discharge, no hernia  MS: no gross musculoskeletal defects noted, no edema  SKIN: no suspicious lesions or rashes  NEURO: Normal strength and tone, mentation intact and speech normal  PSYCH: mentation appears normal, affect normal/bright    This document serves as a record of the services and decisions personally performed and made by Dr. Parson. It was created on his behalf by Marcia Villar, a trained medical scribe. The creation of this document is based the provider's statements to the medical scribe.  Marcia Villar, 4:06 PM         Signed Electronically by: Lamin Pasron MD

## 2025-02-05 DIAGNOSIS — I25.10 CORONARY ARTERY DISEASE INVOLVING NATIVE CORONARY ARTERY OF NATIVE HEART WITHOUT ANGINA PECTORIS: ICD-10-CM

## 2025-02-05 RX ORDER — ASPIRIN 81 MG/1
81 TABLET, COATED ORAL DAILY
Qty: 90 TABLET | Refills: 3 | OUTPATIENT
Start: 2025-02-05

## 2025-02-11 ENCOUNTER — LAB (OUTPATIENT)
Dept: LAB | Facility: CLINIC | Age: 50
End: 2025-02-11
Payer: COMMERCIAL

## 2025-02-11 DIAGNOSIS — I12.9 HYPERTENSIVE KIDNEY DISEASE, STAGE 1: ICD-10-CM

## 2025-02-11 DIAGNOSIS — E78.5 HYPERLIPIDEMIA LDL GOAL <70: ICD-10-CM

## 2025-02-11 DIAGNOSIS — N18.1 HYPERTENSIVE KIDNEY DISEASE, STAGE 1: ICD-10-CM

## 2025-02-11 DIAGNOSIS — I25.10 CORONARY ARTERY DISEASE INVOLVING NATIVE CORONARY ARTERY OF NATIVE HEART WITHOUT ANGINA PECTORIS: ICD-10-CM

## 2025-02-11 LAB
ALT SERPL W P-5'-P-CCNC: 32 U/L (ref 0–70)
CREAT UR-MCNC: 155 MG/DL
MICROALBUMIN UR-MCNC: <12 MG/L
MICROALBUMIN/CREAT UR: NORMAL MG/G{CREAT}

## 2025-02-11 PROCEDURE — 82570 ASSAY OF URINE CREATININE: CPT

## 2025-02-11 PROCEDURE — 84460 ALANINE AMINO (ALT) (SGPT): CPT

## 2025-02-11 PROCEDURE — 82043 UR ALBUMIN QUANTITATIVE: CPT

## 2025-02-11 PROCEDURE — 36415 COLL VENOUS BLD VENIPUNCTURE: CPT

## 2025-03-28 ENCOUNTER — LAB (OUTPATIENT)
Dept: LAB | Facility: CLINIC | Age: 50
End: 2025-03-28
Payer: COMMERCIAL

## 2025-03-28 DIAGNOSIS — N18.1 HYPERTENSIVE KIDNEY DISEASE, STAGE 1: Primary | ICD-10-CM

## 2025-03-28 DIAGNOSIS — I12.9 HYPERTENSIVE KIDNEY DISEASE, STAGE 1: Primary | ICD-10-CM

## 2025-04-14 NOTE — TELEPHONE ENCOUNTER
This was listed as discontinued by cardiology.    Lyric Salas M.D.    Normal rate, irregular rhythm.  Heart sounds S1, S2.  No murmurs, rubs or gallops.

## 2025-05-19 NOTE — PATIENT INSTRUCTIONS
**Required when the History and Physical has been performed prior to Registration**  (within a 30 day period, if it's over 30 days then a new and complete History and Physical needs to be completed)  **An update to the history and physical must be completed prior to the start of the surgery or procedure requiring anesthesia services.**    The History and Physical has been reviewed and I have examined the patient.  Based upon my physical assessment and interview of the patient:    Check and/or complete:    [X] No significant changes have occurred in the patient's condition since the History and Physical was completed.      OR    [_] Changes to History and Physical (see below):          Brenda Parr PA-C   5/19/2025    General Scheduling Information  To schedule your CT/MRI scan, please contact [place] [facility] at [phone]   [address] [place] [address]    To schedule your Surgery, please contact our Specialty Schedulers at [phone]    ENT Clinic Locations Clinic Hours Telephone Number     [facility]  [address] [place] [address]   Tuesday:       8:00am -- 4:00pm    Wednesday:  8:00am - 4:00pm   To schedule an appointment with   Dr. Don,   please contact our   Specialty Scheduling Department at:     [phone]       Jenny [facility]w  [address] [place] [address]   Friday:          8:00am - 4:00pm         Urgent Care Locations Clinic Hours Telephone Numbers     Jenny Jj  [address] [place] [address]     Monday-Friday:     11:00pm - 9:00pm    Saturday-Sunday:  9:00am - 5:00pm   [phone]     Jenny Mathew  [address] [place] [address]     Monday-Friday:      5:00pm - 9:00pm     Saturday-Sunday:  9:00am - 5:00pm   [phone]

## (undated) DEVICE — KIT ENDO FIRST STEP DISINFECTANT 200ML W/POUCH EP-4

## (undated) DEVICE — PREP CHLORAPREP 26ML TINTED ORANGE  260815

## (undated) DEVICE — PAD CHUX UNDERPAD 23X24" 7136

## (undated) RX ORDER — FENTANYL CITRATE 50 UG/ML
INJECTION, SOLUTION INTRAMUSCULAR; INTRAVENOUS
Status: DISPENSED
Start: 2024-03-28

## (undated) RX ORDER — AMINOPHYLLINE 25 MG/ML
INJECTION, SOLUTION INTRAVENOUS
Status: DISPENSED
Start: 2021-08-31

## (undated) RX ORDER — NITROGLYCERIN 0.4 MG/1
TABLET SUBLINGUAL
Status: DISPENSED
Start: 2019-09-30

## (undated) RX ORDER — METOPROLOL TARTRATE 50 MG
TABLET ORAL
Status: DISPENSED
Start: 2019-09-30

## (undated) RX ORDER — AMINOPHYLLINE 25 MG/ML
INJECTION, SOLUTION INTRAVENOUS
Status: DISPENSED
Start: 2023-01-26

## (undated) RX ORDER — REGADENOSON 0.08 MG/ML
INJECTION, SOLUTION INTRAVENOUS
Status: DISPENSED
Start: 2021-08-31

## (undated) RX ORDER — REGADENOSON 0.08 MG/ML
INJECTION, SOLUTION INTRAVENOUS
Status: DISPENSED
Start: 2023-01-26